# Patient Record
Sex: FEMALE | Race: BLACK OR AFRICAN AMERICAN | NOT HISPANIC OR LATINO
[De-identification: names, ages, dates, MRNs, and addresses within clinical notes are randomized per-mention and may not be internally consistent; named-entity substitution may affect disease eponyms.]

---

## 2017-12-29 ENCOUNTER — APPOINTMENT (OUTPATIENT)
Dept: INTERNAL MEDICINE | Facility: CLINIC | Age: 32
End: 2017-12-29
Payer: COMMERCIAL

## 2017-12-29 VITALS
WEIGHT: 195 LBS | DIASTOLIC BLOOD PRESSURE: 80 MMHG | HEIGHT: 67 IN | BODY MASS INDEX: 30.61 KG/M2 | TEMPERATURE: 96.9 F | SYSTOLIC BLOOD PRESSURE: 130 MMHG

## 2017-12-29 DIAGNOSIS — Z87.09 PERSONAL HISTORY OF OTHER DISEASES OF THE RESPIRATORY SYSTEM: ICD-10-CM

## 2017-12-29 PROCEDURE — 87880 STREP A ASSAY W/OPTIC: CPT | Mod: QW

## 2017-12-29 PROCEDURE — 99213 OFFICE O/P EST LOW 20 MIN: CPT | Mod: 25

## 2018-01-03 LAB
BACTERIA THROAT CULT: NORMAL
S PYO AG SPEC QL IA: NEGATIVE

## 2019-03-24 ENCOUNTER — TRANSCRIPTION ENCOUNTER (OUTPATIENT)
Age: 34
End: 2019-03-24

## 2019-03-27 ENCOUNTER — LABORATORY RESULT (OUTPATIENT)
Age: 34
End: 2019-03-27

## 2019-03-27 ENCOUNTER — APPOINTMENT (OUTPATIENT)
Dept: INTERNAL MEDICINE | Facility: CLINIC | Age: 34
End: 2019-03-27
Payer: COMMERCIAL

## 2019-03-27 VITALS
TEMPERATURE: 97.7 F | BODY MASS INDEX: 30.29 KG/M2 | SYSTOLIC BLOOD PRESSURE: 120 MMHG | DIASTOLIC BLOOD PRESSURE: 70 MMHG | WEIGHT: 193 LBS | HEIGHT: 67 IN

## 2019-03-27 DIAGNOSIS — L23.9 ALLERGIC CONTACT DERMATITIS, UNSPECIFIED CAUSE: ICD-10-CM

## 2019-03-27 DIAGNOSIS — L50.0 ALLERGIC URTICARIA: ICD-10-CM

## 2019-03-27 PROCEDURE — 36415 COLL VENOUS BLD VENIPUNCTURE: CPT

## 2019-03-27 PROCEDURE — 99214 OFFICE O/P EST MOD 30 MIN: CPT | Mod: 25

## 2019-03-27 RX ORDER — AZITHROMYCIN 250 MG/1
250 TABLET, FILM COATED ORAL
Qty: 6 | Refills: 1 | Status: COMPLETED | COMMUNITY
Start: 2017-12-29 | End: 2019-03-27

## 2019-03-28 LAB
ALBUMIN SERPL ELPH-MCNC: 4.7 G/DL
ALP BLD-CCNC: 65 U/L
ALT SERPL-CCNC: 25 U/L
ANION GAP SERPL CALC-SCNC: 15 MMOL/L
AST SERPL-CCNC: 28 U/L
BASOPHILS # BLD AUTO: 0.03 K/UL
BASOPHILS NFR BLD AUTO: 0.4 %
BILIRUB SERPL-MCNC: <0.2 MG/DL
BUN SERPL-MCNC: 13 MG/DL
CALCIUM SERPL-MCNC: 9.7 MG/DL
CHLORIDE SERPL-SCNC: 107 MMOL/L
CHOLEST SERPL-MCNC: 199 MG/DL
CHOLEST/HDLC SERPL: 4 RATIO
CO2 SERPL-SCNC: 18 MMOL/L
CREAT SERPL-MCNC: 0.69 MG/DL
EOSINOPHIL # BLD AUTO: 0.09 K/UL
EOSINOPHIL NFR BLD AUTO: 1.2 %
ERYTHROCYTE [SEDIMENTATION RATE] IN BLOOD BY WESTERGREN METHOD: 10 MM/HR
GLUCOSE SERPL-MCNC: 77 MG/DL
HCT VFR BLD CALC: 44.2 %
HDLC SERPL-MCNC: 50 MG/DL
HGB BLD-MCNC: 13 G/DL
IMM GRANULOCYTES NFR BLD AUTO: 0.3 %
LDLC SERPL CALC-MCNC: 130 MG/DL
LDLC SERPL DIRECT ASSAY-MCNC: 138 MG/DL
LYMPHOCYTES # BLD AUTO: 2.38 K/UL
LYMPHOCYTES NFR BLD AUTO: 31.9 %
MAN DIFF?: NORMAL
MCHC RBC-ENTMCNC: 29.1 PG
MCHC RBC-ENTMCNC: 29.4 GM/DL
MCV RBC AUTO: 98.9 FL
MONOCYTES # BLD AUTO: 0.66 K/UL
MONOCYTES NFR BLD AUTO: 8.8 %
NEUTROPHILS # BLD AUTO: 4.28 K/UL
NEUTROPHILS NFR BLD AUTO: 57.4 %
PLATELET # BLD AUTO: 309 K/UL
POTASSIUM SERPL-SCNC: 4.6 MMOL/L
PROT SERPL-MCNC: 7.9 G/DL
RBC # BLD: 4.47 M/UL
RBC # FLD: 13.5 %
SAVE SPECIMEN: NORMAL
SODIUM SERPL-SCNC: 140 MMOL/L
T3 SERPL-MCNC: 127 NG/DL
T3FREE SERPL-MCNC: 3.47 PG/ML
T3RU NFR SERPL: 1.1 TBI
T4 FREE SERPL-MCNC: 1.2 NG/DL
T4 SERPL-MCNC: 7.7 UG/DL
TRIGL SERPL-MCNC: 95 MG/DL
TSH SERPL-ACNC: 1.66 UIU/ML
WBC # FLD AUTO: 7.46 K/UL

## 2019-03-28 NOTE — PHYSICAL EXAM

## 2019-03-28 NOTE — HISTORY OF PRESENT ILLNESS
[FreeTextEntry1] : pt presents for f/u for new perioral rash/ ? allergic reaction hypertension and recent increased fatigue.  She is visiting from [place], [de-identified] : Pt reports itchy dermatitis around lips and along bilateral corners of her mouth for the past 5 days. ? reaction to something she ate--cannot recall what initiated the reaction.\par \par BP has been stable on Propranolol ER 120mg and HCTZ.\par

## 2019-06-20 ENCOUNTER — LABORATORY RESULT (OUTPATIENT)
Age: 34
End: 2019-06-20

## 2019-06-20 ENCOUNTER — APPOINTMENT (OUTPATIENT)
Dept: INTERNAL MEDICINE | Facility: CLINIC | Age: 34
End: 2019-06-20
Payer: COMMERCIAL

## 2019-06-20 VITALS
SYSTOLIC BLOOD PRESSURE: 124 MMHG | TEMPERATURE: 97.7 F | WEIGHT: 193 LBS | BODY MASS INDEX: 30.29 KG/M2 | DIASTOLIC BLOOD PRESSURE: 78 MMHG | HEIGHT: 67 IN

## 2019-06-20 VITALS — DIASTOLIC BLOOD PRESSURE: 80 MMHG | SYSTOLIC BLOOD PRESSURE: 132 MMHG

## 2019-06-20 DIAGNOSIS — R00.2 PALPITATIONS: ICD-10-CM

## 2019-06-20 PROCEDURE — 99214 OFFICE O/P EST MOD 30 MIN: CPT | Mod: 25

## 2019-06-20 PROCEDURE — 36415 COLL VENOUS BLD VENIPUNCTURE: CPT

## 2019-06-20 PROCEDURE — 93000 ELECTROCARDIOGRAM COMPLETE: CPT

## 2019-06-20 RX ORDER — TERCONAZOLE 8 MG/G
0.8 CREAM VAGINAL
Qty: 20 | Refills: 0 | Status: COMPLETED | COMMUNITY
Start: 2017-12-21 | End: 2019-06-20

## 2019-06-20 RX ORDER — MOMETASONE FUROATE 1 MG/G
0.1 OINTMENT TOPICAL TWICE DAILY
Qty: 1 | Refills: 0 | Status: COMPLETED | COMMUNITY
Start: 2019-03-27 | End: 2019-06-20

## 2019-06-21 LAB
ALBUMIN SERPL ELPH-MCNC: 5 G/DL
ALP BLD-CCNC: 72 U/L
ALT SERPL-CCNC: 29 U/L
ANION GAP SERPL CALC-SCNC: 18 MMOL/L
AST SERPL-CCNC: 22 U/L
BASOPHILS # BLD AUTO: 0.04 K/UL
BASOPHILS NFR BLD AUTO: 0.6 %
BILIRUB SERPL-MCNC: 0.3 MG/DL
BUN SERPL-MCNC: 18 MG/DL
CA-I SERPL-SCNC: 1.38 MMOL/L
CALCIUM SERPL-MCNC: 10.4 MG/DL
CALCIUM SERPL-MCNC: 10.4 MG/DL
CHLORIDE SERPL-SCNC: 103 MMOL/L
CO2 SERPL-SCNC: 18 MMOL/L
CREAT SERPL-MCNC: 0.63 MG/DL
EOSINOPHIL # BLD AUTO: 0.08 K/UL
EOSINOPHIL NFR BLD AUTO: 1.2 %
GLUCOSE SERPL-MCNC: 104 MG/DL
HCT VFR BLD CALC: 42.5 %
HGB BLD-MCNC: 13.3 G/DL
IMM GRANULOCYTES NFR BLD AUTO: 0.5 %
LYMPHOCYTES # BLD AUTO: 1.6 K/UL
LYMPHOCYTES NFR BLD AUTO: 24.4 %
MAN DIFF?: NORMAL
MCHC RBC-ENTMCNC: 29.2 PG
MCHC RBC-ENTMCNC: 31.3 GM/DL
MCV RBC AUTO: 93.4 FL
MONOCYTES # BLD AUTO: 0.74 K/UL
MONOCYTES NFR BLD AUTO: 11.3 %
NEUTROPHILS # BLD AUTO: 4.06 K/UL
NEUTROPHILS NFR BLD AUTO: 62 %
PARATHYROID HORMONE INTACT: 46 PG/ML
PHOSPHATE SERPL-MCNC: 3.5 MG/DL
PLATELET # BLD AUTO: 306 K/UL
POTASSIUM SERPL-SCNC: 4.5 MMOL/L
PROT SERPL-MCNC: 8 G/DL
RBC # BLD: 4.55 M/UL
RBC # FLD: 12.4 %
SAVE SPECIMEN: NORMAL
SODIUM SERPL-SCNC: 139 MMOL/L
T3 SERPL-MCNC: 113 NG/DL
T3FREE SERPL-MCNC: 2.97 PG/ML
T3RU NFR SERPL: 1.1 TBI
T4 FREE SERPL-MCNC: 1.3 NG/DL
T4 SERPL-MCNC: 7.7 UG/DL
TSH SERPL-ACNC: 1.71 UIU/ML
WBC # FLD AUTO: 6.55 K/UL

## 2019-06-21 NOTE — HISTORY OF PRESENT ILLNESS
[FreeTextEntry1] : Pt presents with her mother for f/u for recently diagnosed elevated calcium level on blood work, and f/u for hypertension and new symptoms of palpitations. [de-identified] : Pt lives in [place]- is a  who will be in [place] for the next few weeks on vacation.\par \par Last week, she went to an urgent care center because of mild LH, Palpitations.\par BP was wnl but her chemistries revealed mildly elevated Calcium level of 10.5\par She was told to f/u with her PCP.\par \par She also notes recent fleeting palpitations of her heart--lasts less than a minute-no chest pains or SOB but gets occasional Lightheadedness with the palpitations.

## 2019-06-26 ENCOUNTER — APPOINTMENT (OUTPATIENT)
Dept: CARDIOLOGY | Facility: CLINIC | Age: 34
End: 2019-06-26
Payer: COMMERCIAL

## 2019-06-26 PROCEDURE — 93306 TTE W/DOPPLER COMPLETE: CPT

## 2020-07-08 DIAGNOSIS — Z3A.15 15 WEEKS GESTATION OF PREGNANCY: ICD-10-CM

## 2020-09-24 ENCOUNTER — APPOINTMENT (OUTPATIENT)
Dept: INTERNAL MEDICINE | Facility: CLINIC | Age: 35
End: 2020-09-24
Payer: COMMERCIAL

## 2020-09-24 ENCOUNTER — ASOB RESULT (OUTPATIENT)
Age: 35
End: 2020-09-24

## 2020-09-24 ENCOUNTER — OUTPATIENT (OUTPATIENT)
Dept: OUTPATIENT SERVICES | Facility: HOSPITAL | Age: 35
LOS: 1 days | End: 2020-09-24
Payer: COMMERCIAL

## 2020-09-24 ENCOUNTER — APPOINTMENT (OUTPATIENT)
Dept: ANTEPARTUM | Facility: CLINIC | Age: 35
End: 2020-09-24

## 2020-09-24 VITALS
SYSTOLIC BLOOD PRESSURE: 150 MMHG | TEMPERATURE: 96.9 F | DIASTOLIC BLOOD PRESSURE: 92 MMHG | WEIGHT: 216 LBS | BODY MASS INDEX: 33.9 KG/M2 | HEIGHT: 67 IN

## 2020-09-24 VITALS — TEMPERATURE: 99 F

## 2020-09-24 DIAGNOSIS — Z34.90 ENCOUNTER FOR SUPERVISION OF NORMAL PREGNANCY, UNSPECIFIED, UNSPECIFIED TRIMESTER: ICD-10-CM

## 2020-09-24 DIAGNOSIS — Z3A.00 WEEKS OF GESTATION OF PREGNANCY NOT SPECIFIED: ICD-10-CM

## 2020-09-24 DIAGNOSIS — O26.899 OTHER SPECIFIED PREGNANCY RELATED CONDITIONS, UNSPECIFIED TRIMESTER: ICD-10-CM

## 2020-09-24 DIAGNOSIS — R10.30 LOWER ABDOMINAL PAIN, UNSPECIFIED: ICD-10-CM

## 2020-09-24 LAB
ALBUMIN SERPL ELPH-MCNC: 3.8 G/DL — SIGNIFICANT CHANGE UP (ref 3.3–5)
ALP SERPL-CCNC: 66 U/L — SIGNIFICANT CHANGE UP (ref 40–120)
ALT FLD-CCNC: 14 U/L — SIGNIFICANT CHANGE UP (ref 10–45)
ANION GAP SERPL CALC-SCNC: 13 MMOL/L — SIGNIFICANT CHANGE UP (ref 5–17)
APPEARANCE UR: ABNORMAL
APTT BLD: 27 SEC — LOW (ref 27.5–35.5)
AST SERPL-CCNC: 11 U/L — SIGNIFICANT CHANGE UP (ref 10–40)
BACTERIA # UR AUTO: ABNORMAL
BASOPHILS # BLD AUTO: 0.03 K/UL — SIGNIFICANT CHANGE UP (ref 0–0.2)
BASOPHILS NFR BLD AUTO: 0.2 % — SIGNIFICANT CHANGE UP (ref 0–2)
BILIRUB SERPL-MCNC: 0.2 MG/DL — SIGNIFICANT CHANGE UP (ref 0.2–1.2)
BILIRUB UR-MCNC: NEGATIVE — SIGNIFICANT CHANGE UP
BUN SERPL-MCNC: 5 MG/DL — LOW (ref 7–23)
CALCIUM SERPL-MCNC: 10.2 MG/DL — SIGNIFICANT CHANGE UP (ref 8.4–10.5)
CHLORIDE SERPL-SCNC: 102 MMOL/L — SIGNIFICANT CHANGE UP (ref 96–108)
CO2 SERPL-SCNC: 20 MMOL/L — LOW (ref 22–31)
COLOR SPEC: SIGNIFICANT CHANGE UP
CREAT ?TM UR-MCNC: 44 MG/DL — SIGNIFICANT CHANGE UP
CREAT SERPL-MCNC: 0.37 MG/DL — LOW (ref 0.5–1.3)
DIFF PNL FLD: ABNORMAL
EOSINOPHIL # BLD AUTO: 0.05 K/UL — SIGNIFICANT CHANGE UP (ref 0–0.5)
EOSINOPHIL NFR BLD AUTO: 0.3 % — SIGNIFICANT CHANGE UP (ref 0–6)
EPI CELLS # UR: 0 /HPF — SIGNIFICANT CHANGE UP
FIBRINOGEN PPP-MCNC: 667 MG/DL — HIGH (ref 350–510)
GLUCOSE SERPL-MCNC: 86 MG/DL — SIGNIFICANT CHANGE UP (ref 70–99)
GLUCOSE UR QL: NEGATIVE — SIGNIFICANT CHANGE UP
HCT VFR BLD CALC: 34.5 % — SIGNIFICANT CHANGE UP (ref 34.5–45)
HGB BLD-MCNC: 11.4 G/DL — LOW (ref 11.5–15.5)
HYALINE CASTS # UR AUTO: 2 /LPF — SIGNIFICANT CHANGE UP (ref 0–2)
IMM GRANULOCYTES NFR BLD AUTO: 1.3 % — SIGNIFICANT CHANGE UP (ref 0–1.5)
INR BLD: 0.98 RATIO — SIGNIFICANT CHANGE UP (ref 0.88–1.16)
KETONES UR-MCNC: SIGNIFICANT CHANGE UP
LDH SERPL L TO P-CCNC: 107 U/L — SIGNIFICANT CHANGE UP (ref 50–242)
LEUKOCYTE ESTERASE UR-ACNC: ABNORMAL
LYMPHOCYTES # BLD AUTO: 14.9 % — SIGNIFICANT CHANGE UP (ref 13–44)
LYMPHOCYTES # BLD AUTO: 2.14 K/UL — SIGNIFICANT CHANGE UP (ref 1–3.3)
MCHC RBC-ENTMCNC: 30.5 PG — SIGNIFICANT CHANGE UP (ref 27–34)
MCHC RBC-ENTMCNC: 33 GM/DL — SIGNIFICANT CHANGE UP (ref 32–36)
MCV RBC AUTO: 92.2 FL — SIGNIFICANT CHANGE UP (ref 80–100)
MONOCYTES # BLD AUTO: 1.14 K/UL — HIGH (ref 0–0.9)
MONOCYTES NFR BLD AUTO: 7.9 % — SIGNIFICANT CHANGE UP (ref 2–14)
NEUTROPHILS # BLD AUTO: 10.82 K/UL — HIGH (ref 1.8–7.4)
NEUTROPHILS NFR BLD AUTO: 75.4 % — SIGNIFICANT CHANGE UP (ref 43–77)
NITRITE UR-MCNC: NEGATIVE — SIGNIFICANT CHANGE UP
NRBC # BLD: 0 /100 WBCS — SIGNIFICANT CHANGE UP (ref 0–0)
PH UR: 7 — SIGNIFICANT CHANGE UP (ref 5–8)
PLATELET # BLD AUTO: 300 K/UL — SIGNIFICANT CHANGE UP (ref 150–400)
POTASSIUM SERPL-MCNC: 3.6 MMOL/L — SIGNIFICANT CHANGE UP (ref 3.5–5.3)
POTASSIUM SERPL-SCNC: 3.6 MMOL/L — SIGNIFICANT CHANGE UP (ref 3.5–5.3)
PROT ?TM UR-MCNC: 79 MG/DL — HIGH (ref 0–12)
PROT SERPL-MCNC: 6.8 G/DL — SIGNIFICANT CHANGE UP (ref 6–8.3)
PROT UR-MCNC: ABNORMAL
PROT/CREAT UR-RTO: 1.8 RATIO — HIGH (ref 0–0.2)
PROTHROM AB SERPL-ACNC: 11.7 SEC — SIGNIFICANT CHANGE UP (ref 10.6–13.6)
RBC # BLD: 3.74 M/UL — LOW (ref 3.8–5.2)
RBC # FLD: 12.9 % — SIGNIFICANT CHANGE UP (ref 10.3–14.5)
RBC CASTS # UR COMP ASSIST: 86 /HPF — HIGH (ref 0–4)
SODIUM SERPL-SCNC: 135 MMOL/L — SIGNIFICANT CHANGE UP (ref 135–145)
SP GR SPEC: 1.01 — SIGNIFICANT CHANGE UP (ref 1.01–1.02)
URATE SERPL-MCNC: 5.1 MG/DL — SIGNIFICANT CHANGE UP (ref 2.5–7)
UROBILINOGEN FLD QL: NEGATIVE — SIGNIFICANT CHANGE UP
WBC # BLD: 14.37 K/UL — HIGH (ref 3.8–10.5)
WBC # FLD AUTO: 14.37 K/UL — HIGH (ref 3.8–10.5)
WBC UR QL: 46 /HPF — HIGH (ref 0–5)

## 2020-09-24 PROCEDURE — 84550 ASSAY OF BLOOD/URIC ACID: CPT

## 2020-09-24 PROCEDURE — G0463: CPT

## 2020-09-24 PROCEDURE — 85610 PROTHROMBIN TIME: CPT

## 2020-09-24 PROCEDURE — 87086 URINE CULTURE/COLONY COUNT: CPT

## 2020-09-24 PROCEDURE — 85025 COMPLETE CBC W/AUTO DIFF WBC: CPT

## 2020-09-24 PROCEDURE — 99212 OFFICE O/P EST SF 10 MIN: CPT | Mod: 25

## 2020-09-24 PROCEDURE — 82570 ASSAY OF URINE CREATININE: CPT

## 2020-09-24 PROCEDURE — 85730 THROMBOPLASTIN TIME PARTIAL: CPT

## 2020-09-24 PROCEDURE — 85384 FIBRINOGEN ACTIVITY: CPT

## 2020-09-24 PROCEDURE — 80053 COMPREHEN METABOLIC PANEL: CPT

## 2020-09-24 PROCEDURE — 81002 URINALYSIS NONAUTO W/O SCOPE: CPT

## 2020-09-24 PROCEDURE — 81001 URINALYSIS AUTO W/SCOPE: CPT

## 2020-09-24 PROCEDURE — 83615 LACTATE (LD) (LDH) ENZYME: CPT

## 2020-09-24 PROCEDURE — 99214 OFFICE O/P EST MOD 30 MIN: CPT

## 2020-09-24 PROCEDURE — 59025 FETAL NON-STRESS TEST: CPT | Mod: 26

## 2020-09-24 PROCEDURE — 59025 FETAL NON-STRESS TEST: CPT

## 2020-09-24 PROCEDURE — 84156 ASSAY OF PROTEIN URINE: CPT

## 2020-09-24 RX ORDER — CEPHALEXIN 500 MG
1 CAPSULE ORAL
Qty: 14 | Refills: 0
Start: 2020-09-24 | End: 2020-09-30

## 2020-09-26 LAB
CULTURE RESULTS: SIGNIFICANT CHANGE UP
SPECIMEN SOURCE: SIGNIFICANT CHANGE UP

## 2020-11-04 ENCOUNTER — FORM ENCOUNTER (OUTPATIENT)
Age: 35
End: 2020-11-04

## 2020-11-05 ENCOUNTER — APPOINTMENT (OUTPATIENT)
Dept: OBGYN | Facility: CLINIC | Age: 35
End: 2020-11-05
Payer: COMMERCIAL

## 2020-11-05 PROCEDURE — 99214 OFFICE O/P EST MOD 30 MIN: CPT | Mod: 25

## 2020-11-05 PROCEDURE — 99072 ADDL SUPL MATRL&STAF TM PHE: CPT

## 2020-11-05 PROCEDURE — 0500F INITIAL PRENATAL CARE VISIT: CPT

## 2020-11-08 ENCOUNTER — FORM ENCOUNTER (OUTPATIENT)
Age: 35
End: 2020-11-08

## 2020-11-11 ENCOUNTER — APPOINTMENT (OUTPATIENT)
Dept: OBGYN | Facility: CLINIC | Age: 35
End: 2020-11-11
Payer: COMMERCIAL

## 2020-11-11 ENCOUNTER — FORM ENCOUNTER (OUTPATIENT)
Age: 35
End: 2020-11-11

## 2020-11-11 PROCEDURE — 90471 IMMUNIZATION ADMIN: CPT

## 2020-11-11 PROCEDURE — 90715 TDAP VACCINE 7 YRS/> IM: CPT

## 2020-11-11 PROCEDURE — 99072 ADDL SUPL MATRL&STAF TM PHE: CPT

## 2020-11-11 PROCEDURE — 36415 COLL VENOUS BLD VENIPUNCTURE: CPT

## 2020-11-18 ENCOUNTER — APPOINTMENT (OUTPATIENT)
Dept: OBGYN | Facility: CLINIC | Age: 35
End: 2020-11-18
Payer: COMMERCIAL

## 2020-11-18 ENCOUNTER — OUTPATIENT (OUTPATIENT)
Dept: OUTPATIENT SERVICES | Facility: HOSPITAL | Age: 35
LOS: 1 days | End: 2020-11-18
Payer: COMMERCIAL

## 2020-11-18 VITALS
DIASTOLIC BLOOD PRESSURE: 79 MMHG | TEMPERATURE: 100 F | RESPIRATION RATE: 16 BRPM | HEART RATE: 99 BPM | SYSTOLIC BLOOD PRESSURE: 127 MMHG

## 2020-11-18 DIAGNOSIS — Z3A.00 WEEKS OF GESTATION OF PREGNANCY NOT SPECIFIED: ICD-10-CM

## 2020-11-18 DIAGNOSIS — O26.899 OTHER SPECIFIED PREGNANCY RELATED CONDITIONS, UNSPECIFIED TRIMESTER: ICD-10-CM

## 2020-11-18 LAB
ALBUMIN SERPL ELPH-MCNC: 3.6 G/DL — SIGNIFICANT CHANGE UP (ref 3.3–5)
ALP SERPL-CCNC: 112 U/L — SIGNIFICANT CHANGE UP (ref 40–120)
ALT FLD-CCNC: 13 U/L — SIGNIFICANT CHANGE UP (ref 10–45)
ANION GAP SERPL CALC-SCNC: 14 MMOL/L — SIGNIFICANT CHANGE UP (ref 5–17)
APPEARANCE UR: CLEAR — SIGNIFICANT CHANGE UP
APTT BLD: 25.1 SEC — LOW (ref 27.5–35.5)
AST SERPL-CCNC: 13 U/L — SIGNIFICANT CHANGE UP (ref 10–40)
BASOPHILS # BLD AUTO: 0.03 K/UL — SIGNIFICANT CHANGE UP (ref 0–0.2)
BASOPHILS NFR BLD AUTO: 0.4 % — SIGNIFICANT CHANGE UP (ref 0–2)
BILIRUB SERPL-MCNC: 0.2 MG/DL — SIGNIFICANT CHANGE UP (ref 0.2–1.2)
BILIRUB UR-MCNC: NEGATIVE — SIGNIFICANT CHANGE UP
BUN SERPL-MCNC: 6 MG/DL — LOW (ref 7–23)
CALCIUM SERPL-MCNC: 10.5 MG/DL — SIGNIFICANT CHANGE UP (ref 8.4–10.5)
CHLORIDE SERPL-SCNC: 105 MMOL/L — SIGNIFICANT CHANGE UP (ref 96–108)
CO2 SERPL-SCNC: 18 MMOL/L — LOW (ref 22–31)
COLOR SPEC: SIGNIFICANT CHANGE UP
CREAT ?TM UR-MCNC: 77 MG/DL — SIGNIFICANT CHANGE UP
CREAT SERPL-MCNC: 0.41 MG/DL — LOW (ref 0.5–1.3)
DIFF PNL FLD: NEGATIVE — SIGNIFICANT CHANGE UP
EOSINOPHIL # BLD AUTO: 0.04 K/UL — SIGNIFICANT CHANGE UP (ref 0–0.5)
EOSINOPHIL NFR BLD AUTO: 0.5 % — SIGNIFICANT CHANGE UP (ref 0–6)
FIBRINOGEN PPP-MCNC: 796 MG/DL — HIGH (ref 290–520)
GLUCOSE SERPL-MCNC: 85 MG/DL — SIGNIFICANT CHANGE UP (ref 70–99)
GLUCOSE UR QL: NEGATIVE — SIGNIFICANT CHANGE UP
HCT VFR BLD CALC: 36.3 % — SIGNIFICANT CHANGE UP (ref 34.5–45)
HGB BLD-MCNC: 11.8 G/DL — SIGNIFICANT CHANGE UP (ref 11.5–15.5)
IMM GRANULOCYTES NFR BLD AUTO: 0.7 % — SIGNIFICANT CHANGE UP (ref 0–1.5)
INR BLD: 0.91 RATIO — SIGNIFICANT CHANGE UP (ref 0.88–1.16)
KETONES UR-MCNC: NEGATIVE — SIGNIFICANT CHANGE UP
LDH SERPL L TO P-CCNC: 122 U/L — SIGNIFICANT CHANGE UP (ref 50–242)
LEUKOCYTE ESTERASE UR-ACNC: NEGATIVE — SIGNIFICANT CHANGE UP
LYMPHOCYTES # BLD AUTO: 1.76 K/UL — SIGNIFICANT CHANGE UP (ref 1–3.3)
LYMPHOCYTES # BLD AUTO: 20.9 % — SIGNIFICANT CHANGE UP (ref 13–44)
MCHC RBC-ENTMCNC: 29.7 PG — SIGNIFICANT CHANGE UP (ref 27–34)
MCHC RBC-ENTMCNC: 32.5 GM/DL — SIGNIFICANT CHANGE UP (ref 32–36)
MCV RBC AUTO: 91.4 FL — SIGNIFICANT CHANGE UP (ref 80–100)
MONOCYTES # BLD AUTO: 0.89 K/UL — SIGNIFICANT CHANGE UP (ref 0–0.9)
MONOCYTES NFR BLD AUTO: 10.5 % — SIGNIFICANT CHANGE UP (ref 2–14)
NEUTROPHILS # BLD AUTO: 5.66 K/UL — SIGNIFICANT CHANGE UP (ref 1.8–7.4)
NEUTROPHILS NFR BLD AUTO: 67 % — SIGNIFICANT CHANGE UP (ref 43–77)
NITRITE UR-MCNC: NEGATIVE — SIGNIFICANT CHANGE UP
NRBC # BLD: 0 /100 WBCS — SIGNIFICANT CHANGE UP (ref 0–0)
PH UR: 6.5 — SIGNIFICANT CHANGE UP (ref 5–8)
PLATELET # BLD AUTO: 262 K/UL — SIGNIFICANT CHANGE UP (ref 150–400)
POTASSIUM SERPL-MCNC: 4 MMOL/L — SIGNIFICANT CHANGE UP (ref 3.5–5.3)
POTASSIUM SERPL-SCNC: 4 MMOL/L — SIGNIFICANT CHANGE UP (ref 3.5–5.3)
PROT ?TM UR-MCNC: 10 MG/DL — SIGNIFICANT CHANGE UP (ref 0–12)
PROT SERPL-MCNC: 6.6 G/DL — SIGNIFICANT CHANGE UP (ref 6–8.3)
PROT UR-MCNC: NEGATIVE — SIGNIFICANT CHANGE UP
PROT/CREAT UR-RTO: 0.1 RATIO — SIGNIFICANT CHANGE UP (ref 0–0.2)
PROTHROM AB SERPL-ACNC: 11 SEC — SIGNIFICANT CHANGE UP (ref 10.6–13.6)
RBC # BLD: 3.97 M/UL — SIGNIFICANT CHANGE UP (ref 3.8–5.2)
RBC # FLD: 13.2 % — SIGNIFICANT CHANGE UP (ref 10.3–14.5)
SODIUM SERPL-SCNC: 137 MMOL/L — SIGNIFICANT CHANGE UP (ref 135–145)
SP GR SPEC: 1.02 — SIGNIFICANT CHANGE UP (ref 1.01–1.02)
URATE SERPL-MCNC: 6.1 MG/DL — SIGNIFICANT CHANGE UP (ref 2.5–7)
UROBILINOGEN FLD QL: NEGATIVE — SIGNIFICANT CHANGE UP
WBC # BLD: 8.44 K/UL — SIGNIFICANT CHANGE UP (ref 3.8–10.5)
WBC # FLD AUTO: 8.44 K/UL — SIGNIFICANT CHANGE UP (ref 3.8–10.5)

## 2020-11-18 PROCEDURE — 84156 ASSAY OF PROTEIN URINE: CPT

## 2020-11-18 PROCEDURE — 85610 PROTHROMBIN TIME: CPT

## 2020-11-18 PROCEDURE — 85025 COMPLETE CBC W/AUTO DIFF WBC: CPT

## 2020-11-18 PROCEDURE — 76816 OB US FOLLOW-UP PER FETUS: CPT

## 2020-11-18 PROCEDURE — 0502F SUBSEQUENT PRENATAL CARE: CPT

## 2020-11-18 PROCEDURE — 99214 OFFICE O/P EST MOD 30 MIN: CPT

## 2020-11-18 PROCEDURE — 84550 ASSAY OF BLOOD/URIC ACID: CPT

## 2020-11-18 PROCEDURE — 81003 URINALYSIS AUTO W/O SCOPE: CPT

## 2020-11-18 PROCEDURE — 80053 COMPREHEN METABOLIC PANEL: CPT

## 2020-11-18 PROCEDURE — G0463: CPT

## 2020-11-18 PROCEDURE — 99213 OFFICE O/P EST LOW 20 MIN: CPT | Mod: 25

## 2020-11-18 PROCEDURE — 82570 ASSAY OF URINE CREATININE: CPT

## 2020-11-18 PROCEDURE — 99072 ADDL SUPL MATRL&STAF TM PHE: CPT

## 2020-11-18 PROCEDURE — 83615 LACTATE (LD) (LDH) ENZYME: CPT

## 2020-11-18 PROCEDURE — 59025 FETAL NON-STRESS TEST: CPT

## 2020-11-18 PROCEDURE — 85384 FIBRINOGEN ACTIVITY: CPT

## 2020-11-18 PROCEDURE — 85730 THROMBOPLASTIN TIME PARTIAL: CPT

## 2020-11-18 NOTE — OB PROVIDER TRIAGE NOTE - NSHPLABSRESULTS_GEN_ALL_CORE
11.8   8.44  )-----------( 262      ( 2020 16:17 )             36.3       137  |  105  |  6<L>  ----------------------------<  85  4.0   |  18<L>  |  0.41<L>    Ca    10.5      2020 16:17    TPro  6.6  /  Alb  3.6  /  TBili  0.2  /  DBili  x   /  AST  13  /  ALT  13  /  AlkPhos  112      Urinalysis Basic - ( 2020 16:17 )    Color: Light Yellow / Appearance: Clear / S.018 / pH: x  Gluc: x / Ketone: Negative  / Bili: Negative / Urobili: Negative   Blood: x / Protein: Negative / Nitrite: Negative   Leuk Esterase: Negative / RBC: x / WBC x   Sq Epi: x / Non Sq Epi: x / Bacteria: x    P/C ratio 0.1

## 2020-11-18 NOTE — OB PROVIDER TRIAGE NOTE - ADDITIONAL INSTRUCTIONS
- Plan is to D/C home  - Daily FKC  - 24 hour urine collection  - F/u in office on monday and bring 24 hour urine collection  - Pt to continue checking BP's daily at home  - PTL precautions  - PEC precautions  - Pt will discuss with office regarding her decision about external cephalic version

## 2020-11-18 NOTE — OB PROVIDER TRIAGE NOTE - HISTORY OF PRESENT ILLNESS
36yo P0 @ 33w 6 d presents from office for evaluation secondary to new onset elevated BP today.  Pt with h/o CHTN and has been off of medications the entire pregnency.  Pt denies HA, visual changes or epigastric pain   Denies contractions, VB or LOF has + FM    PNC: Dr Doherty  PNI: uncomplicated  PNL: unavailable, will obtain if admitted    All: NKDA  Meds: PNV, ASA ( pt was on propranolol and HCTZ prior to preg)  PMHx: CHTN dx 2016  PSHx: Denies  Socialhx: Deniesx 3  OBhx: Primigravid  GYNhx: Denies fibroids, ov cysts or STDs    ICU Vital Signs Last 24 Hrs  T(C): 37.1 (18 Nov 2020 14:37), Max: 37.1 (18 Nov 2020 14:37)  T(F): 98.8 (18 Nov 2020 14:37), Max: 98.8 (18 Nov 2020 14:37)  HR: 92 (18 Nov 2020 15:36) (89 - 109)  BP: 132/87 (18 Nov 2020 15:35) (132/86 - 140/92)  RR: 16 (18 Nov 2020 14:37) (16 - 16)  SpO2: 99% (18 Nov 2020 15:36) (97% - 99%)    Gen: NAD  Heart: RRR  Lungs: CTA B/L  Abdomen: Gravid, NT  Ext: no calf tenderness      NST: 140's moderate variablity + accels no decels  TOCO: none  VE: deferred       34yo P0 @ 33w 6 d presents from office for evaluation secondary to new onset elevated BP today.  Pt with h/o CHTN and has been off of medications the entire pregnency.  Pt denies HA, visual changes or epigastric pain   Denies contractions, VB or LOF has + FM    PNC: Dr Doherty  PNI: uncomplicated  PNL: unavailable, will obtain if admitted    All: NKDA  Meds: PNV, ASA ( pt was on propranolol and HCTZ prior to preg)  PMHx: CHTN dx 2016  PSHx: Denies  Socialhx: Deniesx 3  OBhx: Primigravid  GYNhx: Denies fibroids, ov cysts or STDs    ICU Vital Signs Last 24 Hrs  T(C): 37.1 (18 Nov 2020 14:37), Max: 37.1 (18 Nov 2020 14:37)  T(F): 98.8 (18 Nov 2020 14:37), Max: 98.8 (18 Nov 2020 14:37)  HR: 92 (18 Nov 2020 15:36) (89 - 109)  BP: 132/87 (18 Nov 2020 15:35) (132/86 - 140/92)  RR: 16 (18 Nov 2020 14:37) (16 - 16)  SpO2: 99% (18 Nov 2020 15:36) (97% - 99%)    Gen: NAD  Heart: RRR  Lungs: CTA B/L  Abdomen: Gravid, NT  Ext: no calf tenderness      NST: 130's moderate variablity + accels no decels  TOCO: none  VE: deferred

## 2020-11-18 NOTE — OB PROVIDER TRIAGE NOTE - PLAN OF CARE
Return to baseline BP's - Plan is to D/C home  - Daily FKC  - 24 hour urine collection  - F/u in office on monday and bring 24 hour urine collection  - Pt to continue checking BP's daily at home  - PTL precautions  - PEC precautions  - Pt will discuss with office regarding her decision about external cephalic version

## 2020-11-18 NOTE — OB PROVIDER TRIAGE NOTE - NSOBPROVIDERNOTE_OBGYN_ALL_OB_FT
P0 @ 33w 6 d with h/o CHTN, now with new onset elevated BP above baseline--> R/O PEC  - NST/TOCO  - Serial BP's  - HELLP labs/urine P/C  D/W Dr Andres Klein PAKaylynC P0 @ 33w 6 d with h/o CHTN, now with new onset elevated BP above baseline--> R/O PEC  - NST/TOCO  - Serial BP's  - HELLP labs/urine P/C  D/W Dr Andres Klein PA-C    Addendum: 6pm  Pt feeling well  Labs reviewed and all normal  Pt with normal BPs for the last 1.5 hours  NST reactive, TOCO negative    Plan discussed with Dr Campos  Pt with chtn with exacerbation in the third trimester- no evidence of PEC at this time  - Plan is to D/C home  - Daily FKC  - 24 hour urine collection  - F/u in office on monday and bring 24 hour urine collection  - Pt to continue checking BP's daily at home  - PTL precautions  - PEC precautions  - Pt will discuss with office regarding her decision about external cephalic version    Nyasia Klein PA-C

## 2020-11-21 ENCOUNTER — FORM ENCOUNTER (OUTPATIENT)
Age: 35
End: 2020-11-21

## 2020-11-23 ENCOUNTER — APPOINTMENT (OUTPATIENT)
Dept: OBGYN | Facility: CLINIC | Age: 35
End: 2020-11-23
Payer: COMMERCIAL

## 2020-11-23 PROCEDURE — 99211 OFF/OP EST MAY X REQ PHY/QHP: CPT

## 2020-11-25 PROBLEM — O10.919 UNSPECIFIED PRE-EXISTING HYPERTENSION COMPLICATING PREGNANCY, UNSPECIFIED TRIMESTER: Chronic | Status: ACTIVE | Noted: 2020-11-18

## 2020-12-02 ENCOUNTER — FORM ENCOUNTER (OUTPATIENT)
Age: 35
End: 2020-12-02

## 2020-12-03 ENCOUNTER — APPOINTMENT (OUTPATIENT)
Dept: OBGYN | Facility: CLINIC | Age: 35
End: 2020-12-03
Payer: COMMERCIAL

## 2020-12-03 PROCEDURE — 99213 OFFICE O/P EST LOW 20 MIN: CPT | Mod: 25

## 2020-12-03 PROCEDURE — 99072 ADDL SUPL MATRL&STAF TM PHE: CPT

## 2020-12-05 ENCOUNTER — OUTPATIENT (OUTPATIENT)
Dept: OUTPATIENT SERVICES | Facility: HOSPITAL | Age: 35
LOS: 1 days | End: 2020-12-05
Payer: COMMERCIAL

## 2020-12-05 VITALS — HEART RATE: 88 BPM | SYSTOLIC BLOOD PRESSURE: 134 MMHG | DIASTOLIC BLOOD PRESSURE: 87 MMHG

## 2020-12-05 VITALS
DIASTOLIC BLOOD PRESSURE: 93 MMHG | SYSTOLIC BLOOD PRESSURE: 142 MMHG | TEMPERATURE: 99 F | RESPIRATION RATE: 20 BRPM | HEART RATE: 112 BPM

## 2020-12-05 DIAGNOSIS — Z3A.00 WEEKS OF GESTATION OF PREGNANCY NOT SPECIFIED: ICD-10-CM

## 2020-12-05 DIAGNOSIS — O26.899 OTHER SPECIFIED PREGNANCY RELATED CONDITIONS, UNSPECIFIED TRIMESTER: ICD-10-CM

## 2020-12-05 LAB
ALBUMIN SERPL ELPH-MCNC: 3.5 G/DL — SIGNIFICANT CHANGE UP (ref 3.3–5)
ALP SERPL-CCNC: 132 U/L — HIGH (ref 40–120)
ALT FLD-CCNC: 10 U/L — SIGNIFICANT CHANGE UP (ref 10–45)
ANION GAP SERPL CALC-SCNC: 10 MMOL/L — SIGNIFICANT CHANGE UP (ref 5–17)
APPEARANCE UR: CLEAR — SIGNIFICANT CHANGE UP
APTT BLD: 24.4 SEC — LOW (ref 27.5–35.5)
AST SERPL-CCNC: 14 U/L — SIGNIFICANT CHANGE UP (ref 10–40)
BACTERIA # UR AUTO: ABNORMAL
BASOPHILS # BLD AUTO: 0.02 K/UL — SIGNIFICANT CHANGE UP (ref 0–0.2)
BASOPHILS NFR BLD AUTO: 0.2 % — SIGNIFICANT CHANGE UP (ref 0–2)
BILIRUB SERPL-MCNC: 0.2 MG/DL — SIGNIFICANT CHANGE UP (ref 0.2–1.2)
BILIRUB UR-MCNC: NEGATIVE — SIGNIFICANT CHANGE UP
BUN SERPL-MCNC: 8 MG/DL — SIGNIFICANT CHANGE UP (ref 7–23)
CALCIUM SERPL-MCNC: 10.6 MG/DL — HIGH (ref 8.4–10.5)
CHLORIDE SERPL-SCNC: 106 MMOL/L — SIGNIFICANT CHANGE UP (ref 96–108)
CO2 SERPL-SCNC: 20 MMOL/L — LOW (ref 22–31)
COLOR SPEC: YELLOW — SIGNIFICANT CHANGE UP
CREAT ?TM UR-MCNC: 116 MG/DL — SIGNIFICANT CHANGE UP
CREAT SERPL-MCNC: 0.48 MG/DL — LOW (ref 0.5–1.3)
DIFF PNL FLD: NEGATIVE — SIGNIFICANT CHANGE UP
EOSINOPHIL # BLD AUTO: 0.05 K/UL — SIGNIFICANT CHANGE UP (ref 0–0.5)
EOSINOPHIL NFR BLD AUTO: 0.5 % — SIGNIFICANT CHANGE UP (ref 0–6)
EPI CELLS # UR: 4 /HPF — SIGNIFICANT CHANGE UP
FIBRINOGEN PPP-MCNC: 677 MG/DL — HIGH (ref 290–520)
GLUCOSE SERPL-MCNC: 95 MG/DL — SIGNIFICANT CHANGE UP (ref 70–99)
GLUCOSE UR QL: NEGATIVE — SIGNIFICANT CHANGE UP
HCT VFR BLD CALC: 35.2 % — SIGNIFICANT CHANGE UP (ref 34.5–45)
HGB BLD-MCNC: 11.5 G/DL — SIGNIFICANT CHANGE UP (ref 11.5–15.5)
HYALINE CASTS # UR AUTO: 3 /LPF — HIGH (ref 0–2)
IMM GRANULOCYTES NFR BLD AUTO: 0.8 % — SIGNIFICANT CHANGE UP (ref 0–1.5)
INR BLD: 0.86 RATIO — LOW (ref 0.88–1.16)
KETONES UR-MCNC: NEGATIVE — SIGNIFICANT CHANGE UP
LDH SERPL L TO P-CCNC: 133 U/L — SIGNIFICANT CHANGE UP (ref 50–242)
LEUKOCYTE ESTERASE UR-ACNC: NEGATIVE — SIGNIFICANT CHANGE UP
LYMPHOCYTES # BLD AUTO: 1.83 K/UL — SIGNIFICANT CHANGE UP (ref 1–3.3)
LYMPHOCYTES # BLD AUTO: 19.4 % — SIGNIFICANT CHANGE UP (ref 13–44)
MCHC RBC-ENTMCNC: 30.2 PG — SIGNIFICANT CHANGE UP (ref 27–34)
MCHC RBC-ENTMCNC: 32.7 GM/DL — SIGNIFICANT CHANGE UP (ref 32–36)
MCV RBC AUTO: 92.4 FL — SIGNIFICANT CHANGE UP (ref 80–100)
MONOCYTES # BLD AUTO: 0.89 K/UL — SIGNIFICANT CHANGE UP (ref 0–0.9)
MONOCYTES NFR BLD AUTO: 9.4 % — SIGNIFICANT CHANGE UP (ref 2–14)
NEUTROPHILS # BLD AUTO: 6.58 K/UL — SIGNIFICANT CHANGE UP (ref 1.8–7.4)
NEUTROPHILS NFR BLD AUTO: 69.7 % — SIGNIFICANT CHANGE UP (ref 43–77)
NITRITE UR-MCNC: NEGATIVE — SIGNIFICANT CHANGE UP
NRBC # BLD: 0 /100 WBCS — SIGNIFICANT CHANGE UP (ref 0–0)
PH UR: 6 — SIGNIFICANT CHANGE UP (ref 5–8)
PLATELET # BLD AUTO: 248 K/UL — SIGNIFICANT CHANGE UP (ref 150–400)
POTASSIUM SERPL-MCNC: 4.3 MMOL/L — SIGNIFICANT CHANGE UP (ref 3.5–5.3)
POTASSIUM SERPL-SCNC: 4.3 MMOL/L — SIGNIFICANT CHANGE UP (ref 3.5–5.3)
PROT ?TM UR-MCNC: 23 MG/DL — HIGH (ref 0–12)
PROT SERPL-MCNC: 6.3 G/DL — SIGNIFICANT CHANGE UP (ref 6–8.3)
PROT UR-MCNC: ABNORMAL
PROT/CREAT UR-RTO: 0.2 RATIO — SIGNIFICANT CHANGE UP (ref 0–0.2)
PROTHROM AB SERPL-ACNC: 10.4 SEC — LOW (ref 10.6–13.6)
RBC # BLD: 3.81 M/UL — SIGNIFICANT CHANGE UP (ref 3.8–5.2)
RBC # FLD: 13.6 % — SIGNIFICANT CHANGE UP (ref 10.3–14.5)
RBC CASTS # UR COMP ASSIST: 5 /HPF — HIGH (ref 0–4)
SODIUM SERPL-SCNC: 136 MMOL/L — SIGNIFICANT CHANGE UP (ref 135–145)
SP GR SPEC: 1.02 — SIGNIFICANT CHANGE UP (ref 1.01–1.02)
URATE SERPL-MCNC: 6.6 MG/DL — SIGNIFICANT CHANGE UP (ref 2.5–7)
UROBILINOGEN FLD QL: NEGATIVE — SIGNIFICANT CHANGE UP
WBC # BLD: 9.45 K/UL — SIGNIFICANT CHANGE UP (ref 3.8–10.5)
WBC # FLD AUTO: 9.45 K/UL — SIGNIFICANT CHANGE UP (ref 3.8–10.5)
WBC UR QL: 13 /HPF — HIGH (ref 0–5)

## 2020-12-05 PROCEDURE — 81001 URINALYSIS AUTO W/SCOPE: CPT

## 2020-12-05 PROCEDURE — 80053 COMPREHEN METABOLIC PANEL: CPT

## 2020-12-05 PROCEDURE — G0463: CPT

## 2020-12-05 PROCEDURE — 85384 FIBRINOGEN ACTIVITY: CPT

## 2020-12-05 PROCEDURE — 82570 ASSAY OF URINE CREATININE: CPT

## 2020-12-05 PROCEDURE — 85610 PROTHROMBIN TIME: CPT

## 2020-12-05 PROCEDURE — 84550 ASSAY OF BLOOD/URIC ACID: CPT

## 2020-12-05 PROCEDURE — 83615 LACTATE (LD) (LDH) ENZYME: CPT

## 2020-12-05 PROCEDURE — 85730 THROMBOPLASTIN TIME PARTIAL: CPT

## 2020-12-05 PROCEDURE — 84156 ASSAY OF PROTEIN URINE: CPT

## 2020-12-05 PROCEDURE — 85025 COMPLETE CBC W/AUTO DIFF WBC: CPT

## 2020-12-05 PROCEDURE — 59025 FETAL NON-STRESS TEST: CPT

## 2020-12-05 NOTE — OB PROVIDER TRIAGE NOTE - NSHPLABSRESULTS_GEN_ALL_CORE
[facility] pending 11.5   9.45  )-----------( 248      ( 12-05 @ 12:31 )             35.2     12-05 @ 12:31    136  |  106  |  8   ----------------------------<  95  4.3   |  20  |  0.48    Ca    10.6      12-05 @ 12:31    TPro  6.3  /  Alb  3.5  /  TBili  0.2  /  DBili  x   /  AST  14  /  ALT  10  /  AlkPhos  132  12-05 @ 12:31    PT/INR - ( 12-05 @ 12:31 )   PT: 10.4 sec;   INR: 0.86 ratio    PTT - ( 12-05 @ 12:31 )  PTT:24.4 sec    Uric Acid: (12-05 @ 12:31)  6.6      Fibrinogen: (12-05 @ 12:31)  677      LDH: (12-05 @ 12:31)  133    Protein/Creatinine Ratio Calculation: 0.2 Ratio (12.05.20 @ 12:34)

## 2020-12-05 NOTE — OB PROVIDER TRIAGE NOTE - NSOBPROVIDERNOTE_OBGYN_ALL_OB_FT
36yo  @36w2d w/ cHTN p/w headache that resolved and severe range BP  - HELLP labs sent  - P/C sent  - follow BPs  - FHT/TOCO    dw Dr.Crihfield Rigoberto Beth PGY2 36yo  @36w2d w/ cHTN p/w headache that resolved and one severe range BP at home, BPs here normal to low mild range, HELLP labs and P/C ratio wnl, NST reactive. No signs of siPEC, possible exacerbation of cHTN vs. poorly calibrated home BP cuff  - clear for d/c with outpatient follow up  - Pt advised to bring cuff to appointment to ensure it compares correctly to office cuff    dw Dr. Pauline Beth PGY2

## 2020-12-05 NOTE — OB PROVIDER TRIAGE NOTE - HISTORY OF PRESENT ILLNESS
34yo  @36w2d  p/f headache overngiht that has since resolved as well as severe range BP at home. –VB, -LOF, -Ctx, +FM. denies fever, chills, nausea, vomiting, diarrhea, headache, constipation, dizziness, syncope, chest pain, palpitations, shortness of breath, dysuria, urgency, frequency.  PNC: cHTN no medication  GBS: unk  EFW: 6#  ObHx: denies  GynHx: denies  MedHx: cHTN, previously was on HCTZ and Propranolol but was d/c during pregnancy  SrgHx: denies  PsychHx: denies  SocialHx: denies  AllergyHx: denies  RxHx: denies

## 2020-12-05 NOTE — OB PROVIDER TRIAGE NOTE - NSHPPHYSICALEXAM_GEN_ALL_CORE
CV:RRR  Pulm: CTA bl  VE: deferred  FHT:  150   , mod cristina, + accels, - decels SARBJIT  TOCO:  none Vital Signs Last 24 Hours  T(C): 37.3 (12-05-20 @ 12:03), Max: 37.3 (12-05-20 @ 12:03)  HR: 88 (12-05-20 @ 13:40) (88 - 112)  BP: 134/87 (12-05-20 @ 13:40) (134/87 - 142/93)  RR: 20 (12-05-20 @ 12:03) (20 - 20)  SpO2: 98% (12-05-20 @ 13:35) (98% - 99%)    CV:RRR  Pulm: CTA bl  VE: deferred  FHT:  150   , mod cristina, + accels, - decels SARBJIT  TOCO:  none

## 2020-12-07 ENCOUNTER — FORM ENCOUNTER (OUTPATIENT)
Age: 35
End: 2020-12-07

## 2020-12-08 ENCOUNTER — INPATIENT (INPATIENT)
Facility: HOSPITAL | Age: 35
LOS: 6 days | Discharge: HOME | End: 2020-12-15
Attending: OBSTETRICS & GYNECOLOGY | Admitting: OBSTETRICS & GYNECOLOGY
Payer: COMMERCIAL

## 2020-12-08 ENCOUNTER — APPOINTMENT (OUTPATIENT)
Dept: OBGYN | Facility: CLINIC | Age: 35
End: 2020-12-08
Payer: COMMERCIAL

## 2020-12-08 ENCOUNTER — OUTPATIENT (OUTPATIENT)
Dept: OUTPATIENT SERVICES | Facility: HOSPITAL | Age: 35
LOS: 1 days | End: 2020-12-08
Payer: COMMERCIAL

## 2020-12-08 VITALS
SYSTOLIC BLOOD PRESSURE: 162 MMHG | HEIGHT: 66.5 IN | OXYGEN SATURATION: 99 % | TEMPERATURE: 98 F | HEART RATE: 93 BPM | RESPIRATION RATE: 18 BRPM | DIASTOLIC BLOOD PRESSURE: 113 MMHG | WEIGHT: 235.01 LBS

## 2020-12-08 VITALS — OXYGEN SATURATION: 100 %

## 2020-12-08 DIAGNOSIS — Z34.80 ENCOUNTER FOR SUPERVISION OF OTHER NORMAL PREGNANCY, UNSPECIFIED TRIMESTER: ICD-10-CM

## 2020-12-08 DIAGNOSIS — O26.899 OTHER SPECIFIED PREGNANCY RELATED CONDITIONS, UNSPECIFIED TRIMESTER: ICD-10-CM

## 2020-12-08 DIAGNOSIS — O11.9 PRE-EXISTING HYPERTENSION WITH PRE-ECLAMPSIA, UNSPECIFIED TRIMESTER: ICD-10-CM

## 2020-12-08 DIAGNOSIS — Z01.818 ENCOUNTER FOR OTHER PREPROCEDURAL EXAMINATION: ICD-10-CM

## 2020-12-08 DIAGNOSIS — O10.919 UNSPECIFIED PRE-EXISTING HYPERTENSION COMPLICATING PREGNANCY, UNSPECIFIED TRIMESTER: ICD-10-CM

## 2020-12-08 DIAGNOSIS — K08.491 PARTIAL LOSS OF TEETH DUE TO OTHER SPECIFIED CAUSE, CLASS I: Chronic | ICD-10-CM

## 2020-12-08 DIAGNOSIS — Z3A.00 WEEKS OF GESTATION OF PREGNANCY NOT SPECIFIED: ICD-10-CM

## 2020-12-08 DIAGNOSIS — Z3A.37 37 WEEKS GESTATION OF PREGNANCY: ICD-10-CM

## 2020-12-08 LAB
ALBUMIN SERPL ELPH-MCNC: 3.6 G/DL — SIGNIFICANT CHANGE UP (ref 3.3–5)
ALBUMIN SERPL ELPH-MCNC: 3.7 G/DL — SIGNIFICANT CHANGE UP (ref 3.3–5)
ALP SERPL-CCNC: 139 U/L — HIGH (ref 40–120)
ALP SERPL-CCNC: 145 U/L — HIGH (ref 40–120)
ALT FLD-CCNC: 11 U/L — SIGNIFICANT CHANGE UP (ref 10–45)
ALT FLD-CCNC: 9 U/L — LOW (ref 10–45)
ANION GAP SERPL CALC-SCNC: 14 MMOL/L — SIGNIFICANT CHANGE UP (ref 5–17)
ANION GAP SERPL CALC-SCNC: 17 MMOL/L — SIGNIFICANT CHANGE UP (ref 5–17)
APPEARANCE UR: CLEAR — SIGNIFICANT CHANGE UP
APTT BLD: 24.6 SEC — LOW (ref 27.5–35.5)
AST SERPL-CCNC: 13 U/L — SIGNIFICANT CHANGE UP (ref 10–40)
AST SERPL-CCNC: 14 U/L — SIGNIFICANT CHANGE UP (ref 10–40)
BACTERIA # UR AUTO: ABNORMAL
BASOPHILS # BLD AUTO: 0.03 K/UL — SIGNIFICANT CHANGE UP (ref 0–0.2)
BASOPHILS # BLD AUTO: 0.03 K/UL — SIGNIFICANT CHANGE UP (ref 0–0.2)
BASOPHILS NFR BLD AUTO: 0.3 % — SIGNIFICANT CHANGE UP (ref 0–2)
BASOPHILS NFR BLD AUTO: 0.3 % — SIGNIFICANT CHANGE UP (ref 0–2)
BILIRUB SERPL-MCNC: 0.1 MG/DL — LOW (ref 0.2–1.2)
BILIRUB SERPL-MCNC: <0.1 MG/DL — LOW (ref 0.2–1.2)
BILIRUB UR-MCNC: NEGATIVE — SIGNIFICANT CHANGE UP
BLD GP AB SCN SERPL QL: NEGATIVE — SIGNIFICANT CHANGE UP
BLD GP AB SCN SERPL QL: NEGATIVE — SIGNIFICANT CHANGE UP
BUN SERPL-MCNC: 10 MG/DL — SIGNIFICANT CHANGE UP (ref 7–23)
BUN SERPL-MCNC: 9 MG/DL — SIGNIFICANT CHANGE UP (ref 7–23)
CALCIUM SERPL-MCNC: 10.3 MG/DL — SIGNIFICANT CHANGE UP (ref 8.4–10.5)
CALCIUM SERPL-MCNC: 9.7 MG/DL — SIGNIFICANT CHANGE UP (ref 8.4–10.5)
CHLORIDE SERPL-SCNC: 103 MMOL/L — SIGNIFICANT CHANGE UP (ref 96–108)
CHLORIDE SERPL-SCNC: 104 MMOL/L — SIGNIFICANT CHANGE UP (ref 96–108)
CO2 SERPL-SCNC: 17 MMOL/L — LOW (ref 22–31)
CO2 SERPL-SCNC: 18 MMOL/L — LOW (ref 22–31)
COLOR SPEC: SIGNIFICANT CHANGE UP
CREAT ?TM UR-MCNC: 77 MG/DL — SIGNIFICANT CHANGE UP
CREAT SERPL-MCNC: 0.52 MG/DL — SIGNIFICANT CHANGE UP (ref 0.5–1.3)
CREAT SERPL-MCNC: 0.53 MG/DL — SIGNIFICANT CHANGE UP (ref 0.5–1.3)
DIFF PNL FLD: NEGATIVE — SIGNIFICANT CHANGE UP
EOSINOPHIL # BLD AUTO: 0.04 K/UL — SIGNIFICANT CHANGE UP (ref 0–0.5)
EOSINOPHIL # BLD AUTO: 0.05 K/UL — SIGNIFICANT CHANGE UP (ref 0–0.5)
EOSINOPHIL NFR BLD AUTO: 0.4 % — SIGNIFICANT CHANGE UP (ref 0–6)
EOSINOPHIL NFR BLD AUTO: 0.5 % — SIGNIFICANT CHANGE UP (ref 0–6)
EPI CELLS # UR: 2 /HPF — SIGNIFICANT CHANGE UP
FIBRINOGEN PPP-MCNC: 623 MG/DL — HIGH (ref 290–520)
GLUCOSE BLDC GLUCOMTR-MCNC: 81 MG/DL — SIGNIFICANT CHANGE UP (ref 70–99)
GLUCOSE SERPL-MCNC: 78 MG/DL — SIGNIFICANT CHANGE UP (ref 70–99)
GLUCOSE SERPL-MCNC: 86 MG/DL — SIGNIFICANT CHANGE UP (ref 70–99)
GLUCOSE UR QL: NEGATIVE — SIGNIFICANT CHANGE UP
HBV SURFACE AG SERPL QL IA: SIGNIFICANT CHANGE UP
HCT VFR BLD CALC: 36.4 % — SIGNIFICANT CHANGE UP (ref 34.5–45)
HCT VFR BLD CALC: 38 % — SIGNIFICANT CHANGE UP (ref 34.5–45)
HGB BLD-MCNC: 12.2 G/DL — SIGNIFICANT CHANGE UP (ref 11.5–15.5)
HGB BLD-MCNC: 12.3 G/DL — SIGNIFICANT CHANGE UP (ref 11.5–15.5)
HYALINE CASTS # UR AUTO: 1 /LPF — SIGNIFICANT CHANGE UP (ref 0–2)
IMM GRANULOCYTES NFR BLD AUTO: 0.7 % — SIGNIFICANT CHANGE UP (ref 0–1.5)
IMM GRANULOCYTES NFR BLD AUTO: 0.8 % — SIGNIFICANT CHANGE UP (ref 0–1.5)
INR BLD: 0.82 RATIO — LOW (ref 0.88–1.16)
KETONES UR-MCNC: NEGATIVE — SIGNIFICANT CHANGE UP
LDH SERPL L TO P-CCNC: 145 U/L — SIGNIFICANT CHANGE UP (ref 50–242)
LDH SERPL L TO P-CCNC: 158 U/L — SIGNIFICANT CHANGE UP (ref 50–242)
LEUKOCYTE ESTERASE UR-ACNC: NEGATIVE — SIGNIFICANT CHANGE UP
LYMPHOCYTES # BLD AUTO: 2.22 K/UL — SIGNIFICANT CHANGE UP (ref 1–3.3)
LYMPHOCYTES # BLD AUTO: 2.28 K/UL — SIGNIFICANT CHANGE UP (ref 1–3.3)
LYMPHOCYTES # BLD AUTO: 20.8 % — SIGNIFICANT CHANGE UP (ref 13–44)
LYMPHOCYTES # BLD AUTO: 20.8 % — SIGNIFICANT CHANGE UP (ref 13–44)
MAGNESIUM SERPL-MCNC: 3.9 MG/DL — HIGH (ref 1.6–2.6)
MCHC RBC-ENTMCNC: 29.7 PG — SIGNIFICANT CHANGE UP (ref 27–34)
MCHC RBC-ENTMCNC: 30.3 PG — SIGNIFICANT CHANGE UP (ref 27–34)
MCHC RBC-ENTMCNC: 32.4 GM/DL — SIGNIFICANT CHANGE UP (ref 32–36)
MCHC RBC-ENTMCNC: 33.5 GM/DL — SIGNIFICANT CHANGE UP (ref 32–36)
MCV RBC AUTO: 90.5 FL — SIGNIFICANT CHANGE UP (ref 80–100)
MCV RBC AUTO: 91.8 FL — SIGNIFICANT CHANGE UP (ref 80–100)
MONOCYTES # BLD AUTO: 0.92 K/UL — HIGH (ref 0–0.9)
MONOCYTES # BLD AUTO: 1.15 K/UL — HIGH (ref 0–0.9)
MONOCYTES NFR BLD AUTO: 10.5 % — SIGNIFICANT CHANGE UP (ref 2–14)
MONOCYTES NFR BLD AUTO: 8.6 % — SIGNIFICANT CHANGE UP (ref 2–14)
NEUTROPHILS # BLD AUTO: 7.38 K/UL — SIGNIFICANT CHANGE UP (ref 1.8–7.4)
NEUTROPHILS # BLD AUTO: 7.38 K/UL — SIGNIFICANT CHANGE UP (ref 1.8–7.4)
NEUTROPHILS NFR BLD AUTO: 67.1 % — SIGNIFICANT CHANGE UP (ref 43–77)
NEUTROPHILS NFR BLD AUTO: 69.2 % — SIGNIFICANT CHANGE UP (ref 43–77)
NITRITE UR-MCNC: NEGATIVE — SIGNIFICANT CHANGE UP
NRBC # BLD: 0 /100 WBCS — SIGNIFICANT CHANGE UP (ref 0–0)
NRBC # BLD: 0 /100 WBCS — SIGNIFICANT CHANGE UP (ref 0–0)
PH UR: 6.5 — SIGNIFICANT CHANGE UP (ref 5–8)
PLATELET # BLD AUTO: 250 K/UL — SIGNIFICANT CHANGE UP (ref 150–400)
PLATELET # BLD AUTO: 254 K/UL — SIGNIFICANT CHANGE UP (ref 150–400)
POTASSIUM SERPL-MCNC: 4 MMOL/L — SIGNIFICANT CHANGE UP (ref 3.5–5.3)
POTASSIUM SERPL-MCNC: 4.2 MMOL/L — SIGNIFICANT CHANGE UP (ref 3.5–5.3)
POTASSIUM SERPL-SCNC: 4 MMOL/L — SIGNIFICANT CHANGE UP (ref 3.5–5.3)
POTASSIUM SERPL-SCNC: 4.2 MMOL/L — SIGNIFICANT CHANGE UP (ref 3.5–5.3)
PROT ?TM UR-MCNC: 46 MG/DL — HIGH (ref 0–12)
PROT SERPL-MCNC: 6.2 G/DL — SIGNIFICANT CHANGE UP (ref 6–8.3)
PROT SERPL-MCNC: 6.5 G/DL — SIGNIFICANT CHANGE UP (ref 6–8.3)
PROT UR-MCNC: ABNORMAL
PROT/CREAT UR-RTO: 0.6 RATIO — HIGH (ref 0–0.2)
PROTHROM AB SERPL-ACNC: 9.9 SEC — LOW (ref 10.6–13.6)
RBC # BLD: 4.02 M/UL — SIGNIFICANT CHANGE UP (ref 3.8–5.2)
RBC # BLD: 4.14 M/UL — SIGNIFICANT CHANGE UP (ref 3.8–5.2)
RBC # FLD: 13.5 % — SIGNIFICANT CHANGE UP (ref 10.3–14.5)
RBC # FLD: 13.7 % — SIGNIFICANT CHANGE UP (ref 10.3–14.5)
RBC CASTS # UR COMP ASSIST: 1 /HPF — SIGNIFICANT CHANGE UP (ref 0–4)
RH IG SCN BLD-IMP: POSITIVE — SIGNIFICANT CHANGE UP
RH IG SCN BLD-IMP: POSITIVE — SIGNIFICANT CHANGE UP
SARS-COV-2 RNA SPEC QL NAA+PROBE: SIGNIFICANT CHANGE UP
SODIUM SERPL-SCNC: 135 MMOL/L — SIGNIFICANT CHANGE UP (ref 135–145)
SODIUM SERPL-SCNC: 138 MMOL/L — SIGNIFICANT CHANGE UP (ref 135–145)
SP GR SPEC: 1.02 — SIGNIFICANT CHANGE UP (ref 1.01–1.02)
URATE SERPL-MCNC: 7.1 MG/DL — HIGH (ref 2.5–7)
URATE SERPL-MCNC: 7.3 MG/DL — HIGH (ref 2.5–7)
UROBILINOGEN FLD QL: NEGATIVE — SIGNIFICANT CHANGE UP
WBC # BLD: 10.66 K/UL — HIGH (ref 3.8–10.5)
WBC # BLD: 10.98 K/UL — HIGH (ref 3.8–10.5)
WBC # FLD AUTO: 10.66 K/UL — HIGH (ref 3.8–10.5)
WBC # FLD AUTO: 10.98 K/UL — HIGH (ref 3.8–10.5)
WBC UR QL: 3 /HPF — SIGNIFICANT CHANGE UP (ref 0–5)

## 2020-12-08 PROCEDURE — 99211 OFF/OP EST MAY X REQ PHY/QHP: CPT

## 2020-12-08 PROCEDURE — 99072 ADDL SUPL MATRL&STAF TM PHE: CPT

## 2020-12-08 PROCEDURE — G0463: CPT

## 2020-12-08 PROCEDURE — 99222 1ST HOSP IP/OBS MODERATE 55: CPT

## 2020-12-08 RX ORDER — SODIUM CHLORIDE 9 MG/ML
1000 INJECTION INTRAMUSCULAR; INTRAVENOUS; SUBCUTANEOUS
Refills: 0 | Status: DISCONTINUED | OUTPATIENT
Start: 2020-12-08 | End: 2020-12-11

## 2020-12-08 RX ORDER — CALCIUM CARBONATE 500(1250)
1 TABLET ORAL THREE TIMES A DAY
Refills: 0 | Status: DISCONTINUED | OUTPATIENT
Start: 2020-12-08 | End: 2020-12-15

## 2020-12-08 RX ORDER — SODIUM CHLORIDE 9 MG/ML
1000 INJECTION, SOLUTION INTRAVENOUS
Refills: 0 | Status: DISCONTINUED | OUTPATIENT
Start: 2020-12-08 | End: 2020-12-08

## 2020-12-08 RX ORDER — SODIUM CHLORIDE 9 MG/ML
1000 INJECTION, SOLUTION INTRAVENOUS
Refills: 0 | Status: DISCONTINUED | OUTPATIENT
Start: 2020-12-08 | End: 2020-12-11

## 2020-12-08 RX ORDER — LABETALOL HCL 100 MG
20 TABLET ORAL ONCE
Refills: 0 | Status: COMPLETED | OUTPATIENT
Start: 2020-12-08 | End: 2020-12-08

## 2020-12-08 RX ORDER — MAGNESIUM SULFATE 500 MG/ML
4 VIAL (ML) INJECTION ONCE
Refills: 0 | Status: COMPLETED | OUTPATIENT
Start: 2020-12-08 | End: 2020-12-08

## 2020-12-08 RX ORDER — SODIUM CHLORIDE 9 MG/ML
1000 INJECTION, SOLUTION INTRAVENOUS
Refills: 0 | Status: DISCONTINUED | OUTPATIENT
Start: 2020-12-08 | End: 2020-12-15

## 2020-12-08 RX ORDER — OXYTOCIN 10 UNIT/ML
333.33 VIAL (ML) INJECTION
Qty: 20 | Refills: 0 | Status: DISCONTINUED | OUTPATIENT
Start: 2020-12-08 | End: 2020-12-11

## 2020-12-08 RX ORDER — AMPICILLIN TRIHYDRATE 250 MG
1 CAPSULE ORAL EVERY 4 HOURS
Refills: 0 | Status: ACTIVE | OUTPATIENT
Start: 2020-12-08 | End: 2021-11-06

## 2020-12-08 RX ORDER — LABETALOL HCL 100 MG
40 TABLET ORAL ONCE
Refills: 0 | Status: COMPLETED | OUTPATIENT
Start: 2020-12-08 | End: 2020-12-08

## 2020-12-08 RX ORDER — LABETALOL HCL 100 MG
200 TABLET ORAL EVERY 8 HOURS
Refills: 0 | Status: DISCONTINUED | OUTPATIENT
Start: 2020-12-08 | End: 2020-12-12

## 2020-12-08 RX ORDER — LABETALOL HCL 100 MG
20 TABLET ORAL ONCE
Refills: 0 | Status: DISCONTINUED | OUTPATIENT
Start: 2020-12-08 | End: 2020-12-08

## 2020-12-08 RX ORDER — MAGNESIUM SULFATE 500 MG/ML
2 VIAL (ML) INJECTION
Qty: 40 | Refills: 0 | Status: DISCONTINUED | OUTPATIENT
Start: 2020-12-08 | End: 2020-12-11

## 2020-12-08 RX ORDER — CITRIC ACID/SODIUM CITRATE 300-500 MG
15 SOLUTION, ORAL ORAL EVERY 6 HOURS
Refills: 0 | Status: ACTIVE | OUTPATIENT
Start: 2020-12-08 | End: 2021-11-06

## 2020-12-08 RX ORDER — AMPICILLIN TRIHYDRATE 250 MG
2 CAPSULE ORAL ONCE
Refills: 0 | Status: COMPLETED | OUTPATIENT
Start: 2020-12-08 | End: 2020-12-08

## 2020-12-08 RX ADMIN — Medication 20 MILLIGRAM(S): at 17:39

## 2020-12-08 RX ADMIN — Medication 40 MILLIGRAM(S): at 17:59

## 2020-12-08 RX ADMIN — Medication 50 GM/HR: at 18:34

## 2020-12-08 RX ADMIN — Medication 216 GRAM(S): at 19:47

## 2020-12-08 RX ADMIN — Medication 200 GRAM(S): at 18:13

## 2020-12-08 RX ADMIN — Medication 200 MILLIGRAM(S): at 19:04

## 2020-12-08 RX ADMIN — Medication 108 GRAM(S): at 23:45

## 2020-12-08 RX ADMIN — Medication 15 MILLILITER(S): at 19:51

## 2020-12-08 RX ADMIN — SODIUM CHLORIDE 125 MILLILITER(S): 9 INJECTION, SOLUTION INTRAVENOUS at 18:13

## 2020-12-08 NOTE — OB PROVIDER LABOR PROGRESS NOTE - NS_SUBJECTIVE/OBJECTIVE_OBGYN_ALL_OB_FT
At bedside to attempt placement. Patient noted to be 2cm externally dilated and 0.5cm internal os. She is luanne too often for vaginal cytotec. She is feeling well.

## 2020-12-08 NOTE — OB PROVIDER LABOR PROGRESS NOTE - ASSESSMENT
36yo  at 35w6d with siPEC on Mag with GDMA2 here for IOL 2/2 severe range BPs. Patient luanne too often for vaginal cytotec, will switch to po. Balloon was unable to be placed as cervix is extremely long and sharply curved anteriorly. Can attempt later when more dilated. Will c/w IOL. Fetal status is reassuring.    D/w Dr. Kailee De Paz PGY-3

## 2020-12-08 NOTE — OB PROVIDER H&P - HISTORY OF PRESENT ILLNESS
OB PA Admission Note    34yo  w/ pregestational hypertension- not currently on medications now @36w2d  presents with elevated BP so 171/111. Reports BPs intermittently elevated at home of 150s-160s/100s-110s. Denies HA, dizziness, CP, SOB, visual disturbance, n/V, RUQ pain.     –VB, -LOF, -Ctx, +FM.  PNC: cHTN no medication  GBS: unk  EFW: 2900  ObHx: denies  GynHx: denies  MedHx: cHTN, previously was on HCTZ and Propranolol but was d/c during pregnancy  SrgHx: denies  PsychHx: denies  SocialHx: denies OB PA Admission Note    36yo  w/ pregestational hypertension- not currently on medications now @36w2d  presents with elevated BP so 171/111. Reports BPs intermittently elevated at home of 150s-160s/100s-110s. Denies HA, dizziness, CP, SOB, visual disturbance, n/V, RUQ pain.   Pt reported she was breech presentation    –VB, -LOF, -Ctx, +FM.  PNC: cHTN no medication  GBS: unk  EFW: 2900  ObHx: denies  GynHx: denies  MedHx: cHTN, previously was on HCTZ and Propranolol but was d/c during pregnancy  SrgHx: denies  PsychHx: denies  SocialHx: denies

## 2020-12-08 NOTE — OB PROVIDER LABOR PROGRESS NOTE - NS_SUBJECTIVE/OBJECTIVE_OBGYN_ALL_OB_FT
At bedside to place VC#1. Patient feels well. GBS+ will need amp. Patient failed 1hr , did not have 3hr. Will treat as A2. Expressed understanding.

## 2020-12-08 NOTE — OB PST NOTE - NS_CIRCUMCISIONPLAN_OBGYN_ALL_OB
July 31, 2020       No Pcp Neede      Patient: Vijay Arshad   YOB: 1988   Date of Visit: 7/31/2020       Dear  Pcp:    Thank you for referring Vijay Arshad to me for evaluation. Below are my notes for this visit with him.    If you have questions, please do not hesitate to call me. I look forward to following your patient along with you.      Sincerely,        Annalise Patel MD        CC: No Recipients  Annalise Patel MD  7/31/2020  4:34 PM  Sign when Signing Visit  Cardiology Office Visit    Chief Complaint/Reason for Visit:   Chief Complaint   Patient presents with   • Consultation     Palpitations         HISTORY OF PRESENT ILLNESS: Vijay Arshad is a 32 year old male with    Hypertension  Palpitations    Pt reports feeling his heart beating when he goes to bed.No racing heart beat.No orthopnea or NPDs.    He describes exertional dyspnea and worries having heart disease.No exertional CP.    Lost 20lbs and BP improving    REVIEW OF SYSTEMS:  All other systems negative, except as noted above in HPI    PAST MEDICAL HISTORY:   Past Medical History:   Diagnosis Date   • Essential (primary) hypertension    • Palpitations        PAST SURGICAL HISTORY:   Past Surgical History:   Procedure Laterality Date   • No past surgeries         FAMILY HISTORY:   Family History   Problem Relation Age of Onset   • Patient is unaware of any medical problems Mother    • Patient is unaware of any medical problems Father        SOCIAL HISTORY:   Social History     Tobacco Use   • Smoking status: Never Smoker   • Smokeless tobacco: Never Used   Substance Use Topics   • Alcohol use: Not Currently   • Drug use: Not Currently       Drug Use:    Not Current*      ALLERGIES:   ALLERGIES:  No Known Allergies    MEDICATIONS:   Current Medications    HYDROCODONE-ACETAMINOPHEN (NORCO)  MG PER TABLET    0.5-1 tablet by mouth every 8-12 hours as needed for severe pain. Continue to decrease use of narcotic pain  medicine as able    LEFLUNOMIDE (ARAVA) 20 MG TABLET    Take 20 mg by mouth.        PHYSICAL  EXAMINATION  Visit Vitals  /88 (BP Location: LUE - Left upper extremity, Patient Position: Sitting, Cuff Size: Regular)   Pulse 88         Physical Exam   Constitutional: He appears well-developed.   HENT:   Head: Normocephalic.   Eyes: EOM are normal.   Neck: Neck supple.   Cardiovascular: Normal rate.   Pulmonary/Chest: Breath sounds normal.   Abdominal: Soft.   Musculoskeletal: Normal range of motion.   Neurological: He is alert.   Skin: Skin is warm.   Nursing note and vitals reviewed.        Labs: Reviewed     No results found for: FSTS, CHOLESTEROL, CALCLDL, HDL, TRIGLYCERIDE, NONHDLCHOL, CHOHDL       IMPRESSION/PLAN:    Essential (primary) hypertension  Goal BP,130/90    Exertional dyspnea  Echo 2017 reviewed  WNL  Stress test ordered    Palpitations  Reassurance   Blood tests ordered      Orders Placed This Encounter   • Comprehensive Metabolic Panel   • Thyroid Stimulating Hormone Reflex   • CBC with Automated Differential   • Lipid Panel With Reflex   • losartan (COZAAR) 50 MG tablet     Sig: Take 1 tablet by mouth daily.     Dispense:  90 tablet     Refill:  3   • Stress test only     Order Specific Question:   Enter the preferred method to stress the patient?     Answer:   Exercise Graded Treadmill (Ryan Protocol)     Order Specific Question:   Is the patient able to ambulate at an incline on a treadmill?     Answer:   Yes         7/31/2020      Jay Patel MD  Cardiology            No

## 2020-12-08 NOTE — OB PROVIDER IHI INDUCTION/AUGMENTATION NOTE - NS_IHIMETHOD_OBGYN_ALL_OB
Cytotec Vaginal/CRB, Cervical Ripening Balloon Cytotec PO/Cytotec Vaginal/CRB, Cervical Ripening Balloon

## 2020-12-08 NOTE — OB PST NOTE - HISTORY OF PRESENT ILLNESS
35 year old female presents at 37 weeks gestation to preadmission testing for scheduled primary  for breech position and preeclampsia. LMP 3/26/2020, last abdominal sonogram done 12/3/2020. Patient with h/o hypertension, not on any medication, patient is in a study group, states was told will put back on antihypertensive medication if pressure not under control. Patient alert and responsive to all stimuli. Patient was in the hospital last 2020 for observation due to headache that lasted 24 hours. Patient denies any headaches at present, no n/v, no blurred vision. Blood pressure at [facility] is 162/113. OB office notified, spoke to Courtney, will inform doctor Kailee, instructed to take patient to Labor and delivery for observation.    covid pcr [id number] done today per patient

## 2020-12-08 NOTE — OB PROVIDER H&P - ASSESSMENT
A/P: 36 yo P0 @ 36w5d w/ super-imposed severe PEC s/p IVP labetalol x 2  - admit to L&D  - routine labs  - NPO  - IV hydration  - fetus reassurign NST, continuous monitoring, breech presentation  - magnesium gtt  - HELLP labs  - likely for  delivery due to elevated BPs    will d/w Dr Kailee Amaro ,PAC   A/P: 34 yo P0 @ 36w5d w/ super-imposed severe PEC s/p IVP labetalol x 2  - admit to L&D  - routine labs  - NPO  - IV hydration  - fetus reassurign NST, continuous monitoring  - magnesium gtt  - HELLP labs  - likely for  delivery due to elevated BPs  - to discuss delivery options with Attending - primary c/s vs IOL for vaginal delivery    will d/w Dr Kailee Amaro ,PAC

## 2020-12-08 NOTE — OB PROVIDER H&P - NSHPPHYSICALEXAM_GEN_ALL_CORE
Gen: NAD  Heart: S1S2 RRR  Lungs: CTA b/l  Abd: gravid nontender  LE: no calf tenderness 2+edema Gen: NAD  Heart: S1S2 RRR  Lungs: CTA b/l  Abd: gravid nontender  LE: no calf tenderness 2+edema    Sono: vertex

## 2020-12-08 NOTE — OB PST NOTE - PROBLEM SELECTOR PLAN 2
scheduled for primary  on 12/10/2020  both verbal and written preop instruction given and patient verbalized understanding.  patient to use chlorhexidine wash per protocol

## 2020-12-09 ENCOUNTER — TRANSCRIPTION ENCOUNTER (OUTPATIENT)
Age: 35
End: 2020-12-09

## 2020-12-09 LAB
ALBUMIN SERPL ELPH-MCNC: 3.2 G/DL — LOW (ref 3.3–5)
ALBUMIN SERPL ELPH-MCNC: 3.3 G/DL — SIGNIFICANT CHANGE UP (ref 3.3–5)
ALP SERPL-CCNC: 127 U/L — HIGH (ref 40–120)
ALP SERPL-CCNC: 128 U/L — HIGH (ref 40–120)
ALP SERPL-CCNC: 130 U/L — HIGH (ref 40–120)
ALP SERPL-CCNC: 135 U/L — HIGH (ref 40–120)
ALT FLD-CCNC: 10 U/L — SIGNIFICANT CHANGE UP (ref 10–45)
ALT FLD-CCNC: 11 U/L — SIGNIFICANT CHANGE UP (ref 10–45)
ALT FLD-CCNC: 13 U/L — SIGNIFICANT CHANGE UP (ref 10–45)
ALT FLD-CCNC: 9 U/L — LOW (ref 10–45)
ANION GAP SERPL CALC-SCNC: 12 MMOL/L — SIGNIFICANT CHANGE UP (ref 5–17)
ANION GAP SERPL CALC-SCNC: 12 MMOL/L — SIGNIFICANT CHANGE UP (ref 5–17)
ANION GAP SERPL CALC-SCNC: 14 MMOL/L — SIGNIFICANT CHANGE UP (ref 5–17)
ANION GAP SERPL CALC-SCNC: 16 MMOL/L — SIGNIFICANT CHANGE UP (ref 5–17)
APTT BLD: 22.5 SEC — LOW (ref 27.5–35.5)
APTT BLD: 24.7 SEC — LOW (ref 27.5–35.5)
APTT BLD: 25.3 SEC — LOW (ref 27.5–35.5)
APTT BLD: 25.6 SEC — LOW (ref 27.5–35.5)
APTT BLD: 25.7 SEC — LOW (ref 27.5–35.5)
AST SERPL-CCNC: 12 U/L — SIGNIFICANT CHANGE UP (ref 10–40)
AST SERPL-CCNC: 14 U/L — SIGNIFICANT CHANGE UP (ref 10–40)
AST SERPL-CCNC: 15 U/L — SIGNIFICANT CHANGE UP (ref 10–40)
AST SERPL-CCNC: 16 U/L — SIGNIFICANT CHANGE UP (ref 10–40)
BASOPHILS # BLD AUTO: 0.03 K/UL — SIGNIFICANT CHANGE UP (ref 0–0.2)
BASOPHILS # BLD AUTO: 0.03 K/UL — SIGNIFICANT CHANGE UP (ref 0–0.2)
BASOPHILS # BLD AUTO: 0.04 K/UL — SIGNIFICANT CHANGE UP (ref 0–0.2)
BASOPHILS # BLD AUTO: 0.04 K/UL — SIGNIFICANT CHANGE UP (ref 0–0.2)
BASOPHILS NFR BLD AUTO: 0.3 % — SIGNIFICANT CHANGE UP (ref 0–2)
BASOPHILS NFR BLD AUTO: 0.4 % — SIGNIFICANT CHANGE UP (ref 0–2)
BASOPHILS NFR BLD AUTO: 0.5 % — SIGNIFICANT CHANGE UP (ref 0–2)
BASOPHILS NFR BLD AUTO: 0.5 % — SIGNIFICANT CHANGE UP (ref 0–2)
BILIRUB SERPL-MCNC: 0.2 MG/DL — SIGNIFICANT CHANGE UP (ref 0.2–1.2)
BUN SERPL-MCNC: 8 MG/DL — SIGNIFICANT CHANGE UP (ref 7–23)
BUN SERPL-MCNC: 8 MG/DL — SIGNIFICANT CHANGE UP (ref 7–23)
BUN SERPL-MCNC: 9 MG/DL — SIGNIFICANT CHANGE UP (ref 7–23)
BUN SERPL-MCNC: 9 MG/DL — SIGNIFICANT CHANGE UP (ref 7–23)
CALCIUM SERPL-MCNC: 8.1 MG/DL — LOW (ref 8.4–10.5)
CALCIUM SERPL-MCNC: 8.2 MG/DL — LOW (ref 8.4–10.5)
CALCIUM SERPL-MCNC: 8.5 MG/DL — SIGNIFICANT CHANGE UP (ref 8.4–10.5)
CALCIUM SERPL-MCNC: 9 MG/DL — SIGNIFICANT CHANGE UP (ref 8.4–10.5)
CHLORIDE SERPL-SCNC: 101 MMOL/L — SIGNIFICANT CHANGE UP (ref 96–108)
CHLORIDE SERPL-SCNC: 103 MMOL/L — SIGNIFICANT CHANGE UP (ref 96–108)
CHLORIDE SERPL-SCNC: 103 MMOL/L — SIGNIFICANT CHANGE UP (ref 96–108)
CHLORIDE SERPL-SCNC: 105 MMOL/L — SIGNIFICANT CHANGE UP (ref 96–108)
CO2 SERPL-SCNC: 18 MMOL/L — LOW (ref 22–31)
CO2 SERPL-SCNC: 19 MMOL/L — LOW (ref 22–31)
CREAT SERPL-MCNC: 0.53 MG/DL — SIGNIFICANT CHANGE UP (ref 0.5–1.3)
CREAT SERPL-MCNC: 0.63 MG/DL — SIGNIFICANT CHANGE UP (ref 0.5–1.3)
CREAT SERPL-MCNC: 0.67 MG/DL — SIGNIFICANT CHANGE UP (ref 0.5–1.3)
CREAT SERPL-MCNC: 0.69 MG/DL — SIGNIFICANT CHANGE UP (ref 0.5–1.3)
EOSINOPHIL # BLD AUTO: 0.04 K/UL — SIGNIFICANT CHANGE UP (ref 0–0.5)
EOSINOPHIL # BLD AUTO: 0.04 K/UL — SIGNIFICANT CHANGE UP (ref 0–0.5)
EOSINOPHIL # BLD AUTO: 0.05 K/UL — SIGNIFICANT CHANGE UP (ref 0–0.5)
EOSINOPHIL # BLD AUTO: 0.07 K/UL — SIGNIFICANT CHANGE UP (ref 0–0.5)
EOSINOPHIL NFR BLD AUTO: 0.4 % — SIGNIFICANT CHANGE UP (ref 0–6)
EOSINOPHIL NFR BLD AUTO: 0.5 % — SIGNIFICANT CHANGE UP (ref 0–6)
EOSINOPHIL NFR BLD AUTO: 0.6 % — SIGNIFICANT CHANGE UP (ref 0–6)
EOSINOPHIL NFR BLD AUTO: 0.9 % — SIGNIFICANT CHANGE UP (ref 0–6)
FIBRINOGEN PPP-MCNC: 609 MG/DL — HIGH (ref 290–520)
FIBRINOGEN PPP-MCNC: 610 MG/DL — HIGH (ref 290–520)
FIBRINOGEN PPP-MCNC: 617 MG/DL — HIGH (ref 290–520)
FIBRINOGEN PPP-MCNC: 624 MG/DL — HIGH (ref 290–520)
FIBRINOGEN PPP-MCNC: 679 MG/DL — HIGH (ref 290–520)
GLUCOSE BLDC GLUCOMTR-MCNC: 73 MG/DL — SIGNIFICANT CHANGE UP (ref 70–99)
GLUCOSE BLDC GLUCOMTR-MCNC: 82 MG/DL — SIGNIFICANT CHANGE UP (ref 70–99)
GLUCOSE BLDC GLUCOMTR-MCNC: 84 MG/DL — SIGNIFICANT CHANGE UP (ref 70–99)
GLUCOSE BLDC GLUCOMTR-MCNC: 85 MG/DL — SIGNIFICANT CHANGE UP (ref 70–99)
GLUCOSE BLDC GLUCOMTR-MCNC: 86 MG/DL — SIGNIFICANT CHANGE UP (ref 70–99)
GLUCOSE BLDC GLUCOMTR-MCNC: 87 MG/DL — SIGNIFICANT CHANGE UP (ref 70–99)
GLUCOSE BLDC GLUCOMTR-MCNC: 89 MG/DL — SIGNIFICANT CHANGE UP (ref 70–99)
GLUCOSE SERPL-MCNC: 78 MG/DL — SIGNIFICANT CHANGE UP (ref 70–99)
GLUCOSE SERPL-MCNC: 84 MG/DL — SIGNIFICANT CHANGE UP (ref 70–99)
GLUCOSE SERPL-MCNC: 86 MG/DL — SIGNIFICANT CHANGE UP (ref 70–99)
GLUCOSE SERPL-MCNC: 86 MG/DL — SIGNIFICANT CHANGE UP (ref 70–99)
HCT VFR BLD CALC: 35.3 % — SIGNIFICANT CHANGE UP (ref 34.5–45)
HCT VFR BLD CALC: 35.4 % — SIGNIFICANT CHANGE UP (ref 34.5–45)
HCT VFR BLD CALC: 35.7 % — SIGNIFICANT CHANGE UP (ref 34.5–45)
HCT VFR BLD CALC: 36.7 % — SIGNIFICANT CHANGE UP (ref 34.5–45)
HGB BLD-MCNC: 11.5 G/DL — SIGNIFICANT CHANGE UP (ref 11.5–15.5)
HGB BLD-MCNC: 11.8 G/DL — SIGNIFICANT CHANGE UP (ref 11.5–15.5)
IMM GRANULOCYTES NFR BLD AUTO: 0.2 % — SIGNIFICANT CHANGE UP (ref 0–1.5)
IMM GRANULOCYTES NFR BLD AUTO: 0.4 % — SIGNIFICANT CHANGE UP (ref 0–1.5)
IMM GRANULOCYTES NFR BLD AUTO: 0.5 % — SIGNIFICANT CHANGE UP (ref 0–1.5)
IMM GRANULOCYTES NFR BLD AUTO: 0.6 % — SIGNIFICANT CHANGE UP (ref 0–1.5)
INR BLD: 0.78 RATIO — LOW (ref 0.88–1.16)
INR BLD: 0.83 RATIO — LOW (ref 0.88–1.16)
INR BLD: 0.83 RATIO — LOW (ref 0.88–1.16)
INR BLD: 0.84 RATIO — LOW (ref 0.88–1.16)
LDH SERPL L TO P-CCNC: 142 U/L — SIGNIFICANT CHANGE UP (ref 50–242)
LDH SERPL L TO P-CCNC: 143 U/L — SIGNIFICANT CHANGE UP (ref 50–242)
LDH SERPL L TO P-CCNC: 151 U/L — SIGNIFICANT CHANGE UP (ref 50–242)
LDH SERPL L TO P-CCNC: 152 U/L — SIGNIFICANT CHANGE UP (ref 50–242)
LYMPHOCYTES # BLD AUTO: 1.68 K/UL — SIGNIFICANT CHANGE UP (ref 1–3.3)
LYMPHOCYTES # BLD AUTO: 1.81 K/UL — SIGNIFICANT CHANGE UP (ref 1–3.3)
LYMPHOCYTES # BLD AUTO: 1.97 K/UL — SIGNIFICANT CHANGE UP (ref 1–3.3)
LYMPHOCYTES # BLD AUTO: 19.3 % — SIGNIFICANT CHANGE UP (ref 13–44)
LYMPHOCYTES # BLD AUTO: 2.16 K/UL — SIGNIFICANT CHANGE UP (ref 1–3.3)
LYMPHOCYTES # BLD AUTO: 22.2 % — SIGNIFICANT CHANGE UP (ref 13–44)
LYMPHOCYTES # BLD AUTO: 23.8 % — SIGNIFICANT CHANGE UP (ref 13–44)
LYMPHOCYTES # BLD AUTO: 23.8 % — SIGNIFICANT CHANGE UP (ref 13–44)
MAGNESIUM SERPL-MCNC: 4.5 MG/DL — HIGH (ref 1.6–2.6)
MAGNESIUM SERPL-MCNC: 5 MG/DL — HIGH (ref 1.6–2.6)
MAGNESIUM SERPL-MCNC: 5.1 MG/DL — HIGH (ref 1.6–2.6)
MAGNESIUM SERPL-MCNC: 5.2 MG/DL — HIGH (ref 1.6–2.6)
MCHC RBC-ENTMCNC: 29.6 PG — SIGNIFICANT CHANGE UP (ref 27–34)
MCHC RBC-ENTMCNC: 29.7 PG — SIGNIFICANT CHANGE UP (ref 27–34)
MCHC RBC-ENTMCNC: 29.8 PG — SIGNIFICANT CHANGE UP (ref 27–34)
MCHC RBC-ENTMCNC: 29.9 PG — SIGNIFICANT CHANGE UP (ref 27–34)
MCHC RBC-ENTMCNC: 32.2 GM/DL — SIGNIFICANT CHANGE UP (ref 32–36)
MCHC RBC-ENTMCNC: 32.2 GM/DL — SIGNIFICANT CHANGE UP (ref 32–36)
MCHC RBC-ENTMCNC: 32.5 GM/DL — SIGNIFICANT CHANGE UP (ref 32–36)
MCHC RBC-ENTMCNC: 32.6 GM/DL — SIGNIFICANT CHANGE UP (ref 32–36)
MCV RBC AUTO: 90.7 FL — SIGNIFICANT CHANGE UP (ref 80–100)
MCV RBC AUTO: 92.2 FL — SIGNIFICANT CHANGE UP (ref 80–100)
MCV RBC AUTO: 92.2 FL — SIGNIFICANT CHANGE UP (ref 80–100)
MCV RBC AUTO: 92.7 FL — SIGNIFICANT CHANGE UP (ref 80–100)
MONOCYTES # BLD AUTO: 0.64 K/UL — SIGNIFICANT CHANGE UP (ref 0–0.9)
MONOCYTES # BLD AUTO: 0.92 K/UL — HIGH (ref 0–0.9)
MONOCYTES # BLD AUTO: 0.95 K/UL — HIGH (ref 0–0.9)
MONOCYTES # BLD AUTO: 0.98 K/UL — HIGH (ref 0–0.9)
MONOCYTES NFR BLD AUTO: 10.9 % — SIGNIFICANT CHANGE UP (ref 2–14)
MONOCYTES NFR BLD AUTO: 11.9 % — SIGNIFICANT CHANGE UP (ref 2–14)
MONOCYTES NFR BLD AUTO: 8.4 % — SIGNIFICANT CHANGE UP (ref 2–14)
MONOCYTES NFR BLD AUTO: 9.4 % — SIGNIFICANT CHANGE UP (ref 2–14)
NEUTROPHILS # BLD AUTO: 5.01 K/UL — SIGNIFICANT CHANGE UP (ref 1.8–7.4)
NEUTROPHILS # BLD AUTO: 5.22 K/UL — SIGNIFICANT CHANGE UP (ref 1.8–7.4)
NEUTROPHILS # BLD AUTO: 5.95 K/UL — SIGNIFICANT CHANGE UP (ref 1.8–7.4)
NEUTROPHILS # BLD AUTO: 6.54 K/UL — SIGNIFICANT CHANGE UP (ref 1.8–7.4)
NEUTROPHILS NFR BLD AUTO: 63.1 % — SIGNIFICANT CHANGE UP (ref 43–77)
NEUTROPHILS NFR BLD AUTO: 66 % — SIGNIFICANT CHANGE UP (ref 43–77)
NEUTROPHILS NFR BLD AUTO: 67.1 % — SIGNIFICANT CHANGE UP (ref 43–77)
NEUTROPHILS NFR BLD AUTO: 68.3 % — SIGNIFICANT CHANGE UP (ref 43–77)
NRBC # BLD: 0 /100 WBCS — SIGNIFICANT CHANGE UP (ref 0–0)
PLATELET # BLD AUTO: 218 K/UL — SIGNIFICANT CHANGE UP (ref 150–400)
PLATELET # BLD AUTO: 222 K/UL — SIGNIFICANT CHANGE UP (ref 150–400)
PLATELET # BLD AUTO: 235 K/UL — SIGNIFICANT CHANGE UP (ref 150–400)
PLATELET # BLD AUTO: 235 K/UL — SIGNIFICANT CHANGE UP (ref 150–400)
POTASSIUM SERPL-MCNC: 3.9 MMOL/L — SIGNIFICANT CHANGE UP (ref 3.5–5.3)
POTASSIUM SERPL-MCNC: 4 MMOL/L — SIGNIFICANT CHANGE UP (ref 3.5–5.3)
POTASSIUM SERPL-MCNC: 4.2 MMOL/L — SIGNIFICANT CHANGE UP (ref 3.5–5.3)
POTASSIUM SERPL-MCNC: 4.4 MMOL/L — SIGNIFICANT CHANGE UP (ref 3.5–5.3)
POTASSIUM SERPL-SCNC: 3.9 MMOL/L — SIGNIFICANT CHANGE UP (ref 3.5–5.3)
POTASSIUM SERPL-SCNC: 4 MMOL/L — SIGNIFICANT CHANGE UP (ref 3.5–5.3)
POTASSIUM SERPL-SCNC: 4.2 MMOL/L — SIGNIFICANT CHANGE UP (ref 3.5–5.3)
POTASSIUM SERPL-SCNC: 4.4 MMOL/L — SIGNIFICANT CHANGE UP (ref 3.5–5.3)
PROT ?TM UR-MCNC: 50 MG/DL — HIGH (ref 0–12)
PROT SERPL-MCNC: 5.8 G/DL — LOW (ref 6–8.3)
PROT SERPL-MCNC: 5.8 G/DL — LOW (ref 6–8.3)
PROT SERPL-MCNC: 6 G/DL — SIGNIFICANT CHANGE UP (ref 6–8.3)
PROT SERPL-MCNC: 6.1 G/DL — SIGNIFICANT CHANGE UP (ref 6–8.3)
PROTHROM AB SERPL-ACNC: 10 SEC — LOW (ref 10.6–13.6)
PROTHROM AB SERPL-ACNC: 10.1 SEC — LOW (ref 10.6–13.6)
PROTHROM AB SERPL-ACNC: 10.2 SEC — LOW (ref 10.6–13.6)
PROTHROM AB SERPL-ACNC: 9.5 SEC — LOW (ref 10.6–13.6)
RBC # BLD: 3.84 M/UL — SIGNIFICANT CHANGE UP (ref 3.8–5.2)
RBC # BLD: 3.87 M/UL — SIGNIFICANT CHANGE UP (ref 3.8–5.2)
RBC # BLD: 3.89 M/UL — SIGNIFICANT CHANGE UP (ref 3.8–5.2)
RBC # BLD: 3.96 M/UL — SIGNIFICANT CHANGE UP (ref 3.8–5.2)
RBC # FLD: 13.6 % — SIGNIFICANT CHANGE UP (ref 10.3–14.5)
RBC # FLD: 13.7 % — SIGNIFICANT CHANGE UP (ref 10.3–14.5)
RBC # FLD: 13.8 % — SIGNIFICANT CHANGE UP (ref 10.3–14.5)
RBC # FLD: 13.9 % — SIGNIFICANT CHANGE UP (ref 10.3–14.5)
RUBV IGG SER-ACNC: 2.6 INDEX — SIGNIFICANT CHANGE UP
RUBV IGG SER-IMP: POSITIVE — SIGNIFICANT CHANGE UP
SARS-COV-2 IGG SERPL QL IA: NEGATIVE — SIGNIFICANT CHANGE UP
SARS-COV-2 IGM SERPL IA-ACNC: <0.1 INDEX — SIGNIFICANT CHANGE UP
SODIUM SERPL-SCNC: 134 MMOL/L — LOW (ref 135–145)
SODIUM SERPL-SCNC: 134 MMOL/L — LOW (ref 135–145)
SODIUM SERPL-SCNC: 136 MMOL/L — SIGNIFICANT CHANGE UP (ref 135–145)
SODIUM SERPL-SCNC: 137 MMOL/L — SIGNIFICANT CHANGE UP (ref 135–145)
T PALLIDUM AB TITR SER: NEGATIVE — SIGNIFICANT CHANGE UP
URATE SERPL-MCNC: 7.8 MG/DL — HIGH (ref 2.5–7)
URATE SERPL-MCNC: 8 MG/DL — HIGH (ref 2.5–7)
URATE SERPL-MCNC: 8.5 MG/DL — HIGH (ref 2.5–7)
URATE SERPL-MCNC: 8.6 MG/DL — HIGH (ref 2.5–7)
WBC # BLD: 7.6 K/UL — SIGNIFICANT CHANGE UP (ref 3.8–10.5)
WBC # BLD: 8.27 K/UL — SIGNIFICANT CHANGE UP (ref 3.8–10.5)
WBC # BLD: 8.7 K/UL — SIGNIFICANT CHANGE UP (ref 3.8–10.5)
WBC # BLD: 9.75 K/UL — SIGNIFICANT CHANGE UP (ref 3.8–10.5)
WBC # FLD AUTO: 7.6 K/UL — SIGNIFICANT CHANGE UP (ref 3.8–10.5)
WBC # FLD AUTO: 8.27 K/UL — SIGNIFICANT CHANGE UP (ref 3.8–10.5)
WBC # FLD AUTO: 8.7 K/UL — SIGNIFICANT CHANGE UP (ref 3.8–10.5)
WBC # FLD AUTO: 9.75 K/UL — SIGNIFICANT CHANGE UP (ref 3.8–10.5)

## 2020-12-09 PROCEDURE — 99232 SBSQ HOSP IP/OBS MODERATE 35: CPT

## 2020-12-09 RX ORDER — ACETAMINOPHEN 500 MG
1000 TABLET ORAL ONCE
Refills: 0 | Status: COMPLETED | OUTPATIENT
Start: 2020-12-09 | End: 2020-12-09

## 2020-12-09 RX ORDER — ACETAMINOPHEN 500 MG
975 TABLET ORAL ONCE
Refills: 0 | Status: COMPLETED | OUTPATIENT
Start: 2020-12-09 | End: 2020-12-09

## 2020-12-09 RX ADMIN — Medication 108 GRAM(S): at 19:50

## 2020-12-09 RX ADMIN — Medication 200 MILLIGRAM(S): at 03:03

## 2020-12-09 RX ADMIN — Medication 1000 MILLIGRAM(S): at 09:15

## 2020-12-09 RX ADMIN — Medication 975 MILLIGRAM(S): at 23:57

## 2020-12-09 RX ADMIN — Medication 108 GRAM(S): at 07:45

## 2020-12-09 RX ADMIN — Medication 108 GRAM(S): at 11:45

## 2020-12-09 RX ADMIN — Medication 200 MILLIGRAM(S): at 19:30

## 2020-12-09 RX ADMIN — Medication 108 GRAM(S): at 15:45

## 2020-12-09 RX ADMIN — Medication 400 MILLIGRAM(S): at 16:55

## 2020-12-09 RX ADMIN — Medication 975 MILLIGRAM(S): at 00:43

## 2020-12-09 RX ADMIN — Medication 108 GRAM(S): at 03:45

## 2020-12-09 RX ADMIN — Medication 200 MILLIGRAM(S): at 11:48

## 2020-12-09 RX ADMIN — Medication 15 MILLILITER(S): at 03:50

## 2020-12-09 RX ADMIN — Medication 108 GRAM(S): at 23:48

## 2020-12-09 NOTE — OB PROVIDER LABOR PROGRESS NOTE - NS_SUBJECTIVE/OBJECTIVE_OBGYN_ALL_OB_FT
PA note  pt seen for placement of vaginal cytotec. last po cytotec administered 9:10am.  pt resting in bed with epidural in plce denies pain  ve 0/50/-3    plan per Dr Campos

## 2020-12-09 NOTE — OB PROVIDER LABOR PROGRESS NOTE - NS_SUBJECTIVE/OBJECTIVE_OBGYN_ALL_OB_FT
Pt seen for placement of CB. CB placed without incident with 60cc of saline in the uterine balloon and 60cc of saline in the vaginal balloon. Pt tolerated well. Comfortable with epidural in place. Vaginal cytotec placed.    VE: 1/long/high  EFM: 120/moderate variabiltiy/+accels/-decels  TOCO: irregular     A/P: siPEC/Mg IOL  - BPs mild range  - epidural in place  - Vaginal cytotec 3 placed  - CB placed    Dr Andres Bravo PAC

## 2020-12-09 NOTE — PRE-ANESTHESIA EVALUATION ADULT - NSANTHPMHFT_GEN_ALL_CORE
36yo  w/ pregestational hypertension- not currently on medications now @36w2d  presents with elevated BP so 171/111. Reports BPs intermittently elevated at home of 150s-160s/100s-110s. Denies HA, dizziness, CP, SOB, visual disturbance, n/V, RUQ pain.   Pt reported she was breech presentation    pre-eclamptic with severe features, denies lower back issues, denies lower extremity weakness or numbness, denies history of bleeding disorders or blood thinner use.

## 2020-12-09 NOTE — OB PROVIDER LABOR PROGRESS NOTE - ASSESSMENT
36yo  @36w6d IOL for siPEC on Magnesium. 0/50/-3.    Plan:  -EFM/[place]  -Monitor vitals  -Will allow rest before next dose of vaginal cytotec    Ivon Mckinley PA-C

## 2020-12-09 NOTE — CHART NOTE - NSCHARTNOTEFT_GEN_A_CORE
HELLP Labs Stable    CBC  H/H: 11.5/35.7  Platelet: 235    Coags  PT/PTT: 10/22.5  INR: 0.83  Fibrinogen: 609    Chemistry  BUN/Creatinine: 9/0.53  AST/ALT: 15/11  Uric Acid: 7.8  LDH: 142  Magnesium: 4.5    Ivon Mckinley PA-C

## 2020-12-09 NOTE — OB PROVIDER LABOR PROGRESS NOTE - ASSESSMENT
36yo P0 IOL for sPEC on Mag. Cat I tracing.    Plan:  -EFM/[place]  -Monitor vitals  -Considering rest + PO diet w/ anesthesia approval  -Plan as per Dr. Campos (PMD)    Ivon Mckinley PA-C

## 2020-12-09 NOTE — OB PROVIDER LABOR PROGRESS NOTE - ASSESSMENT
siPEC/Mg and A2 IOL:  -EFM/[place]  -Continue IOL with CB and vaginal cytotec  -Continue to monitor VS  -Continue Mg  -Anticipate   Plan per Dr. Andres Giron PA-C

## 2020-12-09 NOTE — OB PROVIDER LABOR PROGRESS NOTE - NS_SUBJECTIVE/OBJECTIVE_OBGYN_ALL_OB_FT
PA Note:  Patient seen and evaluated at bedside for placement of vaginal cytotec. Patient comfortable.    Vaginal cytotec #4 (25mcg) placed without incident. CB still in place

## 2020-12-10 LAB
ALBUMIN SERPL ELPH-MCNC: 3.2 G/DL — LOW (ref 3.3–5)
ALBUMIN SERPL ELPH-MCNC: 3.3 G/DL — SIGNIFICANT CHANGE UP (ref 3.3–5)
ALBUMIN SERPL ELPH-MCNC: 3.9 G/DL — SIGNIFICANT CHANGE UP (ref 3.3–5)
ALP SERPL-CCNC: 141 U/L — HIGH (ref 40–120)
ALP SERPL-CCNC: 145 U/L — HIGH (ref 40–120)
ALP SERPL-CCNC: 179 U/L — HIGH (ref 40–120)
ALT FLD-CCNC: 11 U/L — SIGNIFICANT CHANGE UP (ref 10–45)
ALT FLD-CCNC: 13 U/L — SIGNIFICANT CHANGE UP (ref 10–45)
ALT FLD-CCNC: 7 U/L — LOW (ref 10–45)
ANION GAP SERPL CALC-SCNC: 13 MMOL/L — SIGNIFICANT CHANGE UP (ref 5–17)
ANION GAP SERPL CALC-SCNC: 14 MMOL/L — SIGNIFICANT CHANGE UP (ref 5–17)
ANION GAP SERPL CALC-SCNC: 14 MMOL/L — SIGNIFICANT CHANGE UP (ref 5–17)
APTT BLD: 24.8 SEC — LOW (ref 27.5–35.5)
APTT BLD: 25.6 SEC — LOW (ref 27.5–35.5)
APTT BLD: 27.1 SEC — LOW (ref 27.5–35.5)
AST SERPL-CCNC: 16 U/L — SIGNIFICANT CHANGE UP (ref 10–40)
AST SERPL-CCNC: 16 U/L — SIGNIFICANT CHANGE UP (ref 10–40)
AST SERPL-CCNC: 17 U/L — SIGNIFICANT CHANGE UP (ref 10–40)
BASOPHILS # BLD AUTO: 0 K/UL — SIGNIFICANT CHANGE UP (ref 0–0.2)
BASOPHILS # BLD AUTO: 0.03 K/UL — SIGNIFICANT CHANGE UP (ref 0–0.2)
BASOPHILS # BLD AUTO: 0.03 K/UL — SIGNIFICANT CHANGE UP (ref 0–0.2)
BASOPHILS NFR BLD AUTO: 0 % — SIGNIFICANT CHANGE UP (ref 0–2)
BASOPHILS NFR BLD AUTO: 0.3 % — SIGNIFICANT CHANGE UP (ref 0–2)
BASOPHILS NFR BLD AUTO: 0.4 % — SIGNIFICANT CHANGE UP (ref 0–2)
BILIRUB SERPL-MCNC: 0.2 MG/DL — SIGNIFICANT CHANGE UP (ref 0.2–1.2)
BILIRUB SERPL-MCNC: 0.3 MG/DL — SIGNIFICANT CHANGE UP (ref 0.2–1.2)
BILIRUB SERPL-MCNC: 0.4 MG/DL — SIGNIFICANT CHANGE UP (ref 0.2–1.2)
BUN SERPL-MCNC: 8 MG/DL — SIGNIFICANT CHANGE UP (ref 7–23)
CALCIUM SERPL-MCNC: 8.2 MG/DL — LOW (ref 8.4–10.5)
CALCIUM SERPL-MCNC: 8.3 MG/DL — LOW (ref 8.4–10.5)
CALCIUM SERPL-MCNC: 8.9 MG/DL — SIGNIFICANT CHANGE UP (ref 8.4–10.5)
CHLORIDE SERPL-SCNC: 102 MMOL/L — SIGNIFICANT CHANGE UP (ref 96–108)
CHLORIDE SERPL-SCNC: 104 MMOL/L — SIGNIFICANT CHANGE UP (ref 96–108)
CHLORIDE SERPL-SCNC: 104 MMOL/L — SIGNIFICANT CHANGE UP (ref 96–108)
CO2 SERPL-SCNC: 16 MMOL/L — LOW (ref 22–31)
CO2 SERPL-SCNC: 18 MMOL/L — LOW (ref 22–31)
CO2 SERPL-SCNC: 20 MMOL/L — LOW (ref 22–31)
CREAT SERPL-MCNC: 0.63 MG/DL — SIGNIFICANT CHANGE UP (ref 0.5–1.3)
CREAT SERPL-MCNC: 0.69 MG/DL — SIGNIFICANT CHANGE UP (ref 0.5–1.3)
CREAT SERPL-MCNC: 0.69 MG/DL — SIGNIFICANT CHANGE UP (ref 0.5–1.3)
EOSINOPHIL # BLD AUTO: 0 K/UL — SIGNIFICANT CHANGE UP (ref 0–0.5)
EOSINOPHIL # BLD AUTO: 0.02 K/UL — SIGNIFICANT CHANGE UP (ref 0–0.5)
EOSINOPHIL # BLD AUTO: 0.06 K/UL — SIGNIFICANT CHANGE UP (ref 0–0.5)
EOSINOPHIL NFR BLD AUTO: 0 % — SIGNIFICANT CHANGE UP (ref 0–6)
EOSINOPHIL NFR BLD AUTO: 0.2 % — SIGNIFICANT CHANGE UP (ref 0–6)
EOSINOPHIL NFR BLD AUTO: 0.8 % — SIGNIFICANT CHANGE UP (ref 0–6)
FIBRINOGEN PPP-MCNC: 1072 MG/DL — HIGH (ref 290–520)
FIBRINOGEN PPP-MCNC: 846 MG/DL — HIGH (ref 290–520)
FIBRINOGEN PPP-MCNC: 958 MG/DL — HIGH (ref 290–520)
GLUCOSE BLDC GLUCOMTR-MCNC: 68 MG/DL — LOW (ref 70–99)
GLUCOSE BLDC GLUCOMTR-MCNC: 74 MG/DL — SIGNIFICANT CHANGE UP (ref 70–99)
GLUCOSE BLDC GLUCOMTR-MCNC: 92 MG/DL — SIGNIFICANT CHANGE UP (ref 70–99)
GLUCOSE SERPL-MCNC: 102 MG/DL — HIGH (ref 70–99)
GLUCOSE SERPL-MCNC: 76 MG/DL — SIGNIFICANT CHANGE UP (ref 70–99)
GLUCOSE SERPL-MCNC: 82 MG/DL — SIGNIFICANT CHANGE UP (ref 70–99)
HCT VFR BLD CALC: 37.3 % — SIGNIFICANT CHANGE UP (ref 34.5–45)
HCT VFR BLD CALC: 40.1 % — SIGNIFICANT CHANGE UP (ref 34.5–45)
HCT VFR BLD CALC: 46.9 % — HIGH (ref 34.5–45)
HGB BLD-MCNC: 12 G/DL — SIGNIFICANT CHANGE UP (ref 11.5–15.5)
HGB BLD-MCNC: 13.4 G/DL — SIGNIFICANT CHANGE UP (ref 11.5–15.5)
HGB BLD-MCNC: 14.8 G/DL — SIGNIFICANT CHANGE UP (ref 11.5–15.5)
IMM GRANULOCYTES NFR BLD AUTO: 0.4 % — SIGNIFICANT CHANGE UP (ref 0–1.5)
IMM GRANULOCYTES NFR BLD AUTO: 0.6 % — SIGNIFICANT CHANGE UP (ref 0–1.5)
INR BLD: 0.81 RATIO — LOW (ref 0.88–1.16)
INR BLD: 0.83 RATIO — LOW (ref 0.88–1.16)
LDH SERPL L TO P-CCNC: 183 U/L — SIGNIFICANT CHANGE UP (ref 50–242)
LDH SERPL L TO P-CCNC: 208 U/L — SIGNIFICANT CHANGE UP (ref 50–242)
LDH SERPL L TO P-CCNC: 223 U/L — SIGNIFICANT CHANGE UP (ref 50–242)
LYMPHOCYTES # BLD AUTO: 0.61 K/UL — LOW (ref 1–3.3)
LYMPHOCYTES # BLD AUTO: 0.89 K/UL — LOW (ref 1–3.3)
LYMPHOCYTES # BLD AUTO: 1.34 K/UL — SIGNIFICANT CHANGE UP (ref 1–3.3)
LYMPHOCYTES # BLD AUTO: 17.1 % — SIGNIFICANT CHANGE UP (ref 13–44)
LYMPHOCYTES # BLD AUTO: 4.4 % — LOW (ref 13–44)
LYMPHOCYTES # BLD AUTO: 8.3 % — LOW (ref 13–44)
MAGNESIUM SERPL-MCNC: 5.7 MG/DL — HIGH (ref 1.6–2.6)
MAGNESIUM SERPL-MCNC: 5.8 MG/DL — HIGH (ref 1.6–2.6)
MAGNESIUM SERPL-MCNC: 6.4 MG/DL — HIGH (ref 1.6–2.6)
MCHC RBC-ENTMCNC: 29.4 PG — SIGNIFICANT CHANGE UP (ref 27–34)
MCHC RBC-ENTMCNC: 29.9 PG — SIGNIFICANT CHANGE UP (ref 27–34)
MCHC RBC-ENTMCNC: 30.8 PG — SIGNIFICANT CHANGE UP (ref 27–34)
MCHC RBC-ENTMCNC: 31.6 GM/DL — LOW (ref 32–36)
MCHC RBC-ENTMCNC: 32.2 GM/DL — SIGNIFICANT CHANGE UP (ref 32–36)
MCHC RBC-ENTMCNC: 33.4 GM/DL — SIGNIFICANT CHANGE UP (ref 32–36)
MCV RBC AUTO: 92.2 FL — SIGNIFICANT CHANGE UP (ref 80–100)
MCV RBC AUTO: 92.8 FL — SIGNIFICANT CHANGE UP (ref 80–100)
MCV RBC AUTO: 93.1 FL — SIGNIFICANT CHANGE UP (ref 80–100)
MONOCYTES # BLD AUTO: 0.36 K/UL — SIGNIFICANT CHANGE UP (ref 0–0.9)
MONOCYTES # BLD AUTO: 0.57 K/UL — SIGNIFICANT CHANGE UP (ref 0–0.9)
MONOCYTES # BLD AUTO: 0.69 K/UL — SIGNIFICANT CHANGE UP (ref 0–0.9)
MONOCYTES NFR BLD AUTO: 2.6 % — SIGNIFICANT CHANGE UP (ref 2–14)
MONOCYTES NFR BLD AUTO: 6.5 % — SIGNIFICANT CHANGE UP (ref 2–14)
MONOCYTES NFR BLD AUTO: 7.3 % — SIGNIFICANT CHANGE UP (ref 2–14)
NEUTROPHILS # BLD AUTO: 12.73 K/UL — HIGH (ref 1.8–7.4)
NEUTROPHILS # BLD AUTO: 5.8 K/UL — SIGNIFICANT CHANGE UP (ref 1.8–7.4)
NEUTROPHILS # BLD AUTO: 8.99 K/UL — HIGH (ref 1.8–7.4)
NEUTROPHILS NFR BLD AUTO: 74 % — SIGNIFICANT CHANGE UP (ref 43–77)
NEUTROPHILS NFR BLD AUTO: 84.1 % — HIGH (ref 43–77)
NEUTROPHILS NFR BLD AUTO: 91.3 % — HIGH (ref 43–77)
NRBC # BLD: 0 /100 WBCS — SIGNIFICANT CHANGE UP (ref 0–0)
NRBC # BLD: 0 /100 WBCS — SIGNIFICANT CHANGE UP (ref 0–0)
PLATELET # BLD AUTO: 234 K/UL — SIGNIFICANT CHANGE UP (ref 150–400)
PLATELET # BLD AUTO: 234 K/UL — SIGNIFICANT CHANGE UP (ref 150–400)
PLATELET # BLD AUTO: 273 K/UL — SIGNIFICANT CHANGE UP (ref 150–400)
POTASSIUM SERPL-MCNC: 4.2 MMOL/L — SIGNIFICANT CHANGE UP (ref 3.5–5.3)
POTASSIUM SERPL-MCNC: 4.5 MMOL/L — SIGNIFICANT CHANGE UP (ref 3.5–5.3)
POTASSIUM SERPL-MCNC: 4.6 MMOL/L — SIGNIFICANT CHANGE UP (ref 3.5–5.3)
POTASSIUM SERPL-SCNC: 4.2 MMOL/L — SIGNIFICANT CHANGE UP (ref 3.5–5.3)
POTASSIUM SERPL-SCNC: 4.5 MMOL/L — SIGNIFICANT CHANGE UP (ref 3.5–5.3)
POTASSIUM SERPL-SCNC: 4.6 MMOL/L — SIGNIFICANT CHANGE UP (ref 3.5–5.3)
PROT SERPL-MCNC: 5.8 G/DL — LOW (ref 6–8.3)
PROT SERPL-MCNC: 6.2 G/DL — SIGNIFICANT CHANGE UP (ref 6–8.3)
PROT SERPL-MCNC: 7.3 G/DL — SIGNIFICANT CHANGE UP (ref 6–8.3)
PROTHROM AB SERPL-ACNC: 10.1 SEC — LOW (ref 10.6–13.6)
PROTHROM AB SERPL-ACNC: 9.8 SEC — LOW (ref 10.6–13.6)
RBC # BLD: 4.02 M/UL — SIGNIFICANT CHANGE UP (ref 3.8–5.2)
RBC # BLD: 4.35 M/UL — SIGNIFICANT CHANGE UP (ref 3.8–5.2)
RBC # BLD: 5.04 M/UL — SIGNIFICANT CHANGE UP (ref 3.8–5.2)
RBC # FLD: 13.6 % — SIGNIFICANT CHANGE UP (ref 10.3–14.5)
RBC # FLD: 13.8 % — SIGNIFICANT CHANGE UP (ref 10.3–14.5)
RBC # FLD: 14 % — SIGNIFICANT CHANGE UP (ref 10.3–14.5)
SODIUM SERPL-SCNC: 134 MMOL/L — LOW (ref 135–145)
SODIUM SERPL-SCNC: 135 MMOL/L — SIGNIFICANT CHANGE UP (ref 135–145)
SODIUM SERPL-SCNC: 136 MMOL/L — SIGNIFICANT CHANGE UP (ref 135–145)
URATE SERPL-MCNC: 9.3 MG/DL — HIGH (ref 2.5–7)
URATE SERPL-MCNC: 9.3 MG/DL — HIGH (ref 2.5–7)
WBC # BLD: 10.68 K/UL — HIGH (ref 3.8–10.5)
WBC # BLD: 13.94 K/UL — HIGH (ref 3.8–10.5)
WBC # BLD: 7.83 K/UL — SIGNIFICANT CHANGE UP (ref 3.8–10.5)
WBC # FLD AUTO: 10.68 K/UL — HIGH (ref 3.8–10.5)
WBC # FLD AUTO: 13.94 K/UL — HIGH (ref 3.8–10.5)
WBC # FLD AUTO: 7.83 K/UL — SIGNIFICANT CHANGE UP (ref 3.8–10.5)

## 2020-12-10 PROCEDURE — 59514 CESAREAN DELIVERY ONLY: CPT | Mod: AS,U7

## 2020-12-10 PROCEDURE — 88307 TISSUE EXAM BY PATHOLOGIST: CPT | Mod: 26

## 2020-12-10 PROCEDURE — 59515 CESAREAN DELIVERY: CPT

## 2020-12-10 PROCEDURE — 71045 X-RAY EXAM CHEST 1 VIEW: CPT | Mod: 26

## 2020-12-10 RX ORDER — BENZOCAINE AND MENTHOL 5; 1 G/100ML; G/100ML
1 LIQUID ORAL THREE TIMES A DAY
Refills: 0 | Status: DISCONTINUED | OUTPATIENT
Start: 2020-12-10 | End: 2020-12-15

## 2020-12-10 RX ORDER — TETANUS TOXOID, REDUCED DIPHTHERIA TOXOID AND ACELLULAR PERTUSSIS VACCINE, ADSORBED 5; 2.5; 8; 8; 2.5 [IU]/.5ML; [IU]/.5ML; UG/.5ML; UG/.5ML; UG/.5ML
0.5 SUSPENSION INTRAMUSCULAR ONCE
Refills: 0 | Status: DISCONTINUED | OUTPATIENT
Start: 2020-12-10 | End: 2020-12-15

## 2020-12-10 RX ORDER — MORPHINE SULFATE 50 MG/1
2 CAPSULE, EXTENDED RELEASE ORAL ONCE
Refills: 0 | Status: DISCONTINUED | OUTPATIENT
Start: 2020-12-10 | End: 2020-12-11

## 2020-12-10 RX ORDER — OXYCODONE HYDROCHLORIDE 5 MG/1
5 TABLET ORAL
Refills: 0 | Status: DISCONTINUED | OUTPATIENT
Start: 2020-12-10 | End: 2020-12-11

## 2020-12-10 RX ORDER — OXYTOCIN 10 UNIT/ML
333.33 VIAL (ML) INJECTION
Qty: 20 | Refills: 0 | Status: DISCONTINUED | OUTPATIENT
Start: 2020-12-10 | End: 2020-12-15

## 2020-12-10 RX ORDER — ONDANSETRON 8 MG/1
4 TABLET, FILM COATED ORAL EVERY 6 HOURS
Refills: 0 | Status: DISCONTINUED | OUTPATIENT
Start: 2020-12-10 | End: 2020-12-11

## 2020-12-10 RX ORDER — OXYCODONE HYDROCHLORIDE 5 MG/1
5 TABLET ORAL
Refills: 0 | Status: COMPLETED | OUTPATIENT
Start: 2020-12-10 | End: 2020-12-17

## 2020-12-10 RX ORDER — OXYCODONE HYDROCHLORIDE 5 MG/1
5 TABLET ORAL ONCE
Refills: 0 | Status: DISCONTINUED | OUTPATIENT
Start: 2020-12-10 | End: 2020-12-15

## 2020-12-10 RX ORDER — HEPARIN SODIUM 5000 [USP'U]/ML
5000 INJECTION INTRAVENOUS; SUBCUTANEOUS EVERY 12 HOURS
Refills: 0 | Status: DISCONTINUED | OUTPATIENT
Start: 2020-12-11 | End: 2020-12-15

## 2020-12-10 RX ORDER — MAGNESIUM HYDROXIDE 400 MG/1
30 TABLET, CHEWABLE ORAL
Refills: 0 | Status: DISCONTINUED | OUTPATIENT
Start: 2020-12-10 | End: 2020-12-15

## 2020-12-10 RX ORDER — ACETAMINOPHEN 500 MG
1000 TABLET ORAL EVERY 6 HOURS
Refills: 0 | Status: COMPLETED | OUTPATIENT
Start: 2020-12-10 | End: 2020-12-11

## 2020-12-10 RX ORDER — NALOXONE HYDROCHLORIDE 4 MG/.1ML
0.1 SPRAY NASAL
Refills: 0 | Status: DISCONTINUED | OUTPATIENT
Start: 2020-12-10 | End: 2020-12-11

## 2020-12-10 RX ORDER — SODIUM CHLORIDE 9 MG/ML
1000 INJECTION, SOLUTION INTRAVENOUS
Refills: 0 | Status: DISCONTINUED | OUTPATIENT
Start: 2020-12-10 | End: 2020-12-11

## 2020-12-10 RX ORDER — DIPHENHYDRAMINE HCL 50 MG
25 CAPSULE ORAL EVERY 6 HOURS
Refills: 0 | Status: DISCONTINUED | OUTPATIENT
Start: 2020-12-10 | End: 2020-12-15

## 2020-12-10 RX ORDER — FERROUS SULFATE 325(65) MG
325 TABLET ORAL DAILY
Refills: 0 | Status: DISCONTINUED | OUTPATIENT
Start: 2020-12-10 | End: 2020-12-15

## 2020-12-10 RX ORDER — ACETAMINOPHEN 500 MG
975 TABLET ORAL EVERY 6 HOURS
Refills: 0 | Status: COMPLETED | OUTPATIENT
Start: 2020-12-10 | End: 2021-11-08

## 2020-12-10 RX ORDER — OXYTOCIN 10 UNIT/ML
2 VIAL (ML) INJECTION
Qty: 30 | Refills: 0 | Status: DISCONTINUED | OUTPATIENT
Start: 2020-12-10 | End: 2020-12-15

## 2020-12-10 RX ORDER — SIMETHICONE 80 MG/1
80 TABLET, CHEWABLE ORAL EVERY 4 HOURS
Refills: 0 | Status: DISCONTINUED | OUTPATIENT
Start: 2020-12-10 | End: 2020-12-15

## 2020-12-10 RX ORDER — LANOLIN
1 OINTMENT (GRAM) TOPICAL EVERY 6 HOURS
Refills: 0 | Status: DISCONTINUED | OUTPATIENT
Start: 2020-12-10 | End: 2020-12-15

## 2020-12-10 RX ORDER — DEXAMETHASONE 0.5 MG/5ML
4 ELIXIR ORAL EVERY 6 HOURS
Refills: 0 | Status: DISCONTINUED | OUTPATIENT
Start: 2020-12-10 | End: 2020-12-11

## 2020-12-10 RX ADMIN — Medication 400 MILLIGRAM(S): at 15:29

## 2020-12-10 RX ADMIN — Medication 975 MILLIGRAM(S): at 01:58

## 2020-12-10 RX ADMIN — Medication 975 MILLIGRAM(S): at 01:57

## 2020-12-10 RX ADMIN — Medication 108 GRAM(S): at 07:47

## 2020-12-10 RX ADMIN — Medication 200 MILLIGRAM(S): at 12:05

## 2020-12-10 RX ADMIN — Medication 200 MILLIGRAM(S): at 03:48

## 2020-12-10 RX ADMIN — Medication 108 GRAM(S): at 03:45

## 2020-12-10 NOTE — OB PROVIDER LABOR PROGRESS NOTE - NS_OBIHICONTRACTIONPATTERNDETAILS_OBGYN_ALL_OB_FT
3ctx/10min
irreg
q5-6m
3ctx/10min
Irregular contractions
Irregular contractions
ctx q 4-5 minutes
no ctx

## 2020-12-10 NOTE — OB PROVIDER LABOR PROGRESS NOTE - NS_SUBJECTIVE/OBJECTIVE_OBGYN_ALL_OB_FT
Called to bedside 2/2 concern for pulmonary edema. SERGIO Bravo PA-C had placed vaginal cytotec and on lung exam noted crackles at R lung base. Patient feels well, complains of mild tickle in throat. She denies SOB, chest pain, abdominal pain, n/v.      T(C): 36.8 (12-10-20 @ 01:45), Max: 37.1 (12-09-20 @ 06:52)  HR: 78 (12-10-20 @ 04:07) (61 - 95)  BP: 145/87 (12-10-20 @ 04:06) (104/58 - 147/87)  SpO2: 97% (12-10-20 @ 04:07) (90% - 100%)    well appearing, NAD  heart: RR, S1 and S2 nl  lungs: some referred expiratory breath sounds; crackles notable at R lung base  Bedside lung sono: 2-3 B line in R lung base, scant A lines, no pleural thickening, pleural sliding appeared normal    CAPILLARY BLOOD GLUCOSE  POCT Blood Glucose.: 74 mg/dL (10 Dec 2020 04:10)  POCT Blood Glucose.: 82 mg/dL (09 Dec 2020 23:52)  POCT Blood Glucose.: 85 mg/dL (09 Dec 2020 19:52)  POCT Blood Glucose.: 87 mg/dL (09 Dec 2020 16:03)  POCT Blood Glucose.: 73 mg/dL (09 Dec 2020 12:36)  POCT Blood Glucose.: 86 mg/dL (09 Dec 2020 08:29)  POCT Blood Glucose.: 89 mg/dL (09 Dec 2020 04:31)   N/A      12-08 @ 07:01  -  12-09 @ 07:00  --------------------------------------------------------  IN:    dextrose 5% + sodium chloride 0.9%: 439.4 mL    IV PiggyBack: 200 mL    Lactated Ringers: 284.4 mL    Magnesium Sulfate: 607.8 mL  Total IN: 1531.6 mL    OUT:    Voided (mL): 1600 mL  Total OUT: 1600 mL    Total NET: -68.4 mL      12-09 @ 07:01  -  12-10 @ 04:12  --------------------------------------------------------  IN:    dextrose 5% + sodium chloride 0.9%: 600 mL    IV PiggyBack: 150 mL    IV PiggyBack: 100 mL    Magnesium Sulfate: 592.2 mL    sodium chloride 0.9%: 1000 mL  Total IN: 2442.2 mL    OUT:    Indwelling Catheter - Urethral (mL): 1050 mL    Voided (mL): 600 mL  Total OUT: 1650 mL    Total NET: 792.2 mL

## 2020-12-10 NOTE — OB RN DELIVERY SUMMARY - NS_RNDELIVATTEST_OBGYN_ALL_OB
MEDICATIONS  (STANDING):  amiodarone    Tablet 100 milliGRAM(s) Oral daily  cinacalcet 30 milliGRAM(s) Oral <User Schedule>  docusate sodium 100 milliGRAM(s) Oral three times a day  epoetin amee Injectable 97138 Unit(s) IV Push <User Schedule>  lactobacillus acidophilus 1 Tablet(s) Oral daily  losartan 25 milliGRAM(s) Oral daily  pantoprazole  Injectable 40 milliGRAM(s) IV Push two times a day  predniSONE   Tablet 5 milliGRAM(s) Oral daily  sevelamer hydrochloride 2400 milliGRAM(s) Oral three times a day with meals  warfarin 1.5 milliGRAM(s) Oral daily    MEDICATIONS  (PRN):  acetaminophen   Tablet .. 650 milliGRAM(s) Oral every 6 hours PRN Temp greater or equal to 38.5C (101.3F), Mild Pain (1 - 3)  aluminum hydroxide/magnesium hydroxide/simethicone Suspension 30 milliLiter(s) Oral every 6 hours PRN Dyspepsia  oxyCODONE    IR 5 milliGRAM(s) Oral every 6 hours PRN Severe Pain (7 - 10)
Laps, needles and instrument count was correct

## 2020-12-10 NOTE — OB PROVIDER LABOR PROGRESS NOTE - ASSESSMENT
A/P:   discussed starting low-dose pitocin with patient. She says she will think about it and considering having an elective  delivery due to prolonged induction time  continue Mg gtt  BP monitoring    will d/w Dr Prasad Amaro, PAC

## 2020-12-10 NOTE — OB PROVIDER LABOR PROGRESS NOTE - NS_OBIHIFHRDETAILS_OBGYN_ALL_OB_FT
115/moderate variability/no accels/no decels
120 mod cristina, +accels, no decels
cat 1
120, min-mod cristina, + accels, - decels
130, mod cristina, +accel, no decel (disconintuous 2/2 body habitus, readjusted monitor)
135, mod cristina, +accels, no decels
EFM: Baseline 120, moderate variability, no accels, no decels, Cat I
EFM: Baseline 130, moderate variability, +accels, no decels, Cat I

## 2020-12-10 NOTE — BRIEF OPERATIVE NOTE - OPERATION/FINDINGS
primary low transverse  section performed. delivery of liveborn female infant in vertex position. APGARS 8/9. Manual delivery of intact placenta. uterus exteriorized and cleared of clot, membrane and debris. hysterotomy closed w/ caprosyn with imbricating layer using same suture material. perforation to R mesosalpinx occurred, with hemostasis obtained with manual pressure. figure of 8 suture using 2.0 vicryl performed to stabilize perforation. Good hemostasis assured. Uterus return to abdomen, fibular and swathi placed at defected area of R mesosalpinx; good hemostasis assured. Muscle reapproximated with plain suture. Fascia reapproximated with 0.0 vicryl. Subcutaneous layer reapproximated in layers. Skin closed in subcuticular fashion. EBL 1000  IVF 1700 Pt tolerated procedure well and transferred to PACU

## 2020-12-10 NOTE — OB PROVIDER LABOR PROGRESS NOTE - ASSESSMENT
Cervical balloon displaced as uterine balloon was noted to be deflated with a defect in the balloon. As the patient was 3cm, decision was made not to place new balloon. Vaginal cytotec #6 placed.    Bobby Moore MD PGY4

## 2020-12-10 NOTE — OB PROVIDER LABOR PROGRESS NOTE - NS_SUBJECTIVE/OBJECTIVE_OBGYN_ALL_OB_FT
OB PA Note    Pt evaluated at bedside. No current complaints. Denies SOB/CP    Vital Signs Last 24 Hrs  T(C): 36.6 (10 Dec 2020 07:50), Max: 37.1 (09 Dec 2020 18:47)  T(F): 97.88 (10 Dec 2020 07:50), Max: 98.78 (09 Dec 2020 18:47)  HR: 91 (10 Dec 2020 10:07) (61 - 91)  BP: 137/84 (10 Dec 2020 10:07) (104/58 - 154/87)  BP(mean): --  RR: 18 (10 Dec 2020 07:50) (18 - 18)  SpO2: 99% (10 Dec 2020 10:07) (90% - 100%)    VE 3/long/high/tone firm    FHT discontinuous previously 120 min cristina no accels no decels  toco: irregular

## 2020-12-10 NOTE — OB PROVIDER LABOR PROGRESS NOTE - NS_SUBJECTIVE/OBJECTIVE_OBGYN_ALL_OB_FT
R4 Labor Note    Pt seen and examined at bedside for vaginal cytotec placement.    T(C): 36.5 (12-10-20 @ 05:45), Max: 37.1 (12-09-20 @ 09:19)  HR: 87 (12-10-20 @ 07:04) (61 - 94)  BP: 140/80 (12-10-20 @ 06:50) (104/58 - 154/87)  RR: --  SpO2: 91% (12-10-20 @ 07:04) (90% - 100%)    SVE:  EFM:  [place]:    -     Discussed with Dr. Bobby Moore MD PGY4

## 2020-12-10 NOTE — OB PROVIDER LABOR PROGRESS NOTE - ASSESSMENT
pt counseled on the risks of CD vs continued induction. discussed risks of PPH. currently on magnesium. will proceed with  delivery. all questions answered.    EULALIA Parham

## 2020-12-10 NOTE — OB PROVIDER LABOR PROGRESS NOTE - ASSESSMENT
36yo  with siPEC and GDMA2 on Magnesium with new onset mild pulmonary edema. Patient does have risk factors including siPEC, obesity, prolonged induction. Patient is not symptomatic and VS wnl.   -low fluid to 75ml/hr total  -UOP wnl, monitor closely  -monitor O2 sats  -c/w antihypertensives  -cepacol for throat  -repeat HELLP labs  -CXR if sx worsen  -consider Keppra and lasix if pulmonary edema worsens    D/w Dr. Andres De Paz PGY-3 36yo  with siPEC and GDMA2 on Magnesium with new onset mild pulmonary edema. Patient does have risk factors including siPEC, obesity, prolonged induction. Patient is not symptomatic and VS wnl.   -low fluid to 75ml/hr total  -UOP wnl, monitor closely  -monitor O2 sats  -c/w antihypertensives  -cepacol for throat  -repeat HELLP labs  -CXR if sx worsen  -consider Keppra and lasix if pulmonary edema worsens  -c/w SCDs    D/w Dr. Andres De Paz PGY-3

## 2020-12-10 NOTE — CHART NOTE - NSCHARTNOTEFT_GEN_A_CORE
Pt evaluated at bedside due to O2 sat of 93% on RA. Pt denies SOB, dizziness, CP. pain currently well controlled. Short-term improvement in O2 sat with deep inspirations and sitting upright to 98%    ICU Vital Signs Last 24 Hrs  T(C): 36.6 (10 Dec 2020 14:05), Max: 37.1 (09 Dec 2020 18:47)  T(F): 97.9 (10 Dec 2020 14:05), Max: 98.78 (09 Dec 2020 18:47)  HR: 76 (10 Dec 2020 16:05) (61 - 92)  BP: 144/87 (10 Dec 2020 16:05) (110/65 - 161/88)  BP(mean): 110 (10 Dec 2020 15:50) (98 - 110)  ABP: --  ABP(mean): --  RR: 22 (10 Dec 2020 16:05) (18 - 27)  SpO2: 93% (10 Dec 2020 16:05) (86% - 100%)      Gen: NAD  Heart: S1S2 RRR  Lungs: dec BS noted R lower lobe. L lung CTA  Abd: soft mildly distended    Plan:   incentive spirometer  CXR ordered    Dr Parham at bedside    Flores Amaro, PAC

## 2020-12-10 NOTE — PROVIDER CONTACT NOTE (CHANGE IN STATUS NOTIFICATION) - SITUATION
Pt. c/o wheezing and cough, and swelling, fluids were lowered as per MD, consulted with safety MD's to discuss plan of care of pt.

## 2020-12-10 NOTE — OB NEONATOLOGY/PEDIATRICIAN DELIVERY SUMMARY - NSPEDSNEONOTESA_OBGYN_ALL_OB_FT
Baby girl born at 37wks via C/S to a 36 y/o  mother who is O+ blood type, HBsAg neg, HIV neg, rubella immune, RPR neg, GBS pos and treated with amp X10. Maternal history of chronic HTN on mag sulfate. No significant prenatal history.  SROM at 0328 with clear fluids. Baby emerged with good tone and cord clamping was delayed for 45 secs. Infant placed radiant warmer dried and stimulated. APGARS 8/ 9. Mom would like to breast feed and declines the birth dose of Hep B.

## 2020-12-10 NOTE — OB PROVIDER LABOR PROGRESS NOTE - NS_SUBJECTIVE/OBJECTIVE_OBGYN_ALL_OB_FT
s/p cervical balloon and found to be ruptured. cervix with tone tone but discussion regarding pitocin. pt decided to proceed with  delivery for arrest of dilation.

## 2020-12-10 NOTE — OB PROVIDER LABOR PROGRESS NOTE - NS_SUBJECTIVE/OBJECTIVE_OBGYN_ALL_OB_FT
Vaginal cytotec placed. CB in place. Pt was ruptured on exam.     EFM: 120/moderate variability/-accels/-decels  TOCO: irregular    A/P: siPEC/Mg IOL  - BPs mild range   - VC placed  - CB in place  - epidural in place     Asuncion Bravo PAC

## 2020-12-11 LAB
ALBUMIN SERPL ELPH-MCNC: 3.1 G/DL — LOW (ref 3.3–5)
ALP SERPL-CCNC: 131 U/L — HIGH (ref 40–120)
ALT FLD-CCNC: 9 U/L — LOW (ref 10–45)
ANION GAP SERPL CALC-SCNC: 11 MMOL/L — SIGNIFICANT CHANGE UP (ref 5–17)
APTT BLD: 26.5 SEC — LOW (ref 27.5–35.5)
AST SERPL-CCNC: 16 U/L — SIGNIFICANT CHANGE UP (ref 10–40)
BASOPHILS # BLD AUTO: 0.02 K/UL — SIGNIFICANT CHANGE UP (ref 0–0.2)
BASOPHILS NFR BLD AUTO: 0.1 % — SIGNIFICANT CHANGE UP (ref 0–2)
BILIRUB SERPL-MCNC: 0.1 MG/DL — LOW (ref 0.2–1.2)
BUN SERPL-MCNC: 10 MG/DL — SIGNIFICANT CHANGE UP (ref 7–23)
CALCIUM SERPL-MCNC: 8.1 MG/DL — LOW (ref 8.4–10.5)
CHLORIDE SERPL-SCNC: 102 MMOL/L — SIGNIFICANT CHANGE UP (ref 96–108)
CO2 SERPL-SCNC: 19 MMOL/L — LOW (ref 22–31)
CREAT SERPL-MCNC: 0.73 MG/DL — SIGNIFICANT CHANGE UP (ref 0.5–1.3)
EOSINOPHIL # BLD AUTO: 0 K/UL — SIGNIFICANT CHANGE UP (ref 0–0.5)
EOSINOPHIL NFR BLD AUTO: 0 % — SIGNIFICANT CHANGE UP (ref 0–6)
FIBRINOGEN PPP-MCNC: 1020 MG/DL — HIGH (ref 290–520)
GLUCOSE SERPL-MCNC: 104 MG/DL — HIGH (ref 70–99)
HCT VFR BLD CALC: 30.8 % — LOW (ref 34.5–45)
HGB BLD-MCNC: 10 G/DL — LOW (ref 11.5–15.5)
IMM GRANULOCYTES NFR BLD AUTO: 0.6 % — SIGNIFICANT CHANGE UP (ref 0–1.5)
INR BLD: 0.88 RATIO — SIGNIFICANT CHANGE UP (ref 0.88–1.16)
LDH SERPL L TO P-CCNC: 232 U/L — SIGNIFICANT CHANGE UP (ref 50–242)
LYMPHOCYTES # BLD AUTO: 1.31 K/UL — SIGNIFICANT CHANGE UP (ref 1–3.3)
LYMPHOCYTES # BLD AUTO: 9.2 % — LOW (ref 13–44)
MAGNESIUM SERPL-MCNC: 5.6 MG/DL — HIGH (ref 1.6–2.6)
MCHC RBC-ENTMCNC: 29.9 PG — SIGNIFICANT CHANGE UP (ref 27–34)
MCHC RBC-ENTMCNC: 32.5 GM/DL — SIGNIFICANT CHANGE UP (ref 32–36)
MCV RBC AUTO: 91.9 FL — SIGNIFICANT CHANGE UP (ref 80–100)
MONOCYTES # BLD AUTO: 1.27 K/UL — HIGH (ref 0–0.9)
MONOCYTES NFR BLD AUTO: 8.9 % — SIGNIFICANT CHANGE UP (ref 2–14)
NEUTROPHILS # BLD AUTO: 11.54 K/UL — HIGH (ref 1.8–7.4)
NEUTROPHILS NFR BLD AUTO: 81.2 % — HIGH (ref 43–77)
NRBC # BLD: 0 /100 WBCS — SIGNIFICANT CHANGE UP (ref 0–0)
PLATELET # BLD AUTO: 204 K/UL — SIGNIFICANT CHANGE UP (ref 150–400)
POTASSIUM SERPL-MCNC: 4.6 MMOL/L — SIGNIFICANT CHANGE UP (ref 3.5–5.3)
POTASSIUM SERPL-SCNC: 4.6 MMOL/L — SIGNIFICANT CHANGE UP (ref 3.5–5.3)
PROT SERPL-MCNC: 5.5 G/DL — LOW (ref 6–8.3)
PROTHROM AB SERPL-ACNC: 10.6 SEC — SIGNIFICANT CHANGE UP (ref 10.6–13.6)
RBC # BLD: 3.35 M/UL — LOW (ref 3.8–5.2)
RBC # FLD: 13.8 % — SIGNIFICANT CHANGE UP (ref 10.3–14.5)
SODIUM SERPL-SCNC: 132 MMOL/L — LOW (ref 135–145)
URATE SERPL-MCNC: 9.6 MG/DL — HIGH (ref 2.5–7)
WBC # BLD: 14.22 K/UL — HIGH (ref 3.8–10.5)
WBC # FLD AUTO: 14.22 K/UL — HIGH (ref 3.8–10.5)

## 2020-12-11 RX ORDER — ACETAMINOPHEN 500 MG
975 TABLET ORAL EVERY 6 HOURS
Refills: 0 | Status: DISCONTINUED | OUTPATIENT
Start: 2020-12-11 | End: 2020-12-15

## 2020-12-11 RX ORDER — OXYCODONE HYDROCHLORIDE 5 MG/1
5 TABLET ORAL
Refills: 0 | Status: DISCONTINUED | OUTPATIENT
Start: 2020-12-11 | End: 2020-12-15

## 2020-12-11 RX ADMIN — Medication 975 MILLIGRAM(S): at 20:36

## 2020-12-11 RX ADMIN — Medication 1 TABLET(S): at 11:48

## 2020-12-11 RX ADMIN — Medication 400 MILLIGRAM(S): at 11:45

## 2020-12-11 RX ADMIN — Medication 200 MILLIGRAM(S): at 11:45

## 2020-12-11 RX ADMIN — OXYCODONE HYDROCHLORIDE 5 MILLIGRAM(S): 5 TABLET ORAL at 23:45

## 2020-12-11 RX ADMIN — Medication 975 MILLIGRAM(S): at 21:00

## 2020-12-11 RX ADMIN — Medication 400 MILLIGRAM(S): at 06:07

## 2020-12-11 RX ADMIN — Medication 1000 MILLIGRAM(S): at 01:04

## 2020-12-11 RX ADMIN — HEPARIN SODIUM 5000 UNIT(S): 5000 INJECTION INTRAVENOUS; SUBCUTANEOUS at 17:32

## 2020-12-11 RX ADMIN — Medication 200 MILLIGRAM(S): at 02:58

## 2020-12-11 RX ADMIN — Medication 325 MILLIGRAM(S): at 11:48

## 2020-12-11 RX ADMIN — HEPARIN SODIUM 5000 UNIT(S): 5000 INJECTION INTRAVENOUS; SUBCUTANEOUS at 06:07

## 2020-12-11 RX ADMIN — Medication 200 MILLIGRAM(S): at 17:29

## 2020-12-11 RX ADMIN — OXYCODONE HYDROCHLORIDE 5 MILLIGRAM(S): 5 TABLET ORAL at 23:13

## 2020-12-11 NOTE — PROGRESS NOTE ADULT - PROBLEM SELECTOR PLAN 1
-f/u AM HELLP labs  -f/u final CXR read, prelim with mild pulmonary edema  -O2 sat wnl, will continue to encourage IS and OOB  -HSQ  -Tylenol, Motrin, Oxy for pain  -c/w Labetalol 200 TID  -d/c Mg at 1p  -routine PP orders    P. Uppalapati PGY-3

## 2020-12-11 NOTE — ANESTHESIA FOLLOW-UP NOTE - NSINTERVIEW_GEN_ALL_CORE
Interviewed and evaluated
,[email or web address]

## 2020-12-11 NOTE — PROGRESS NOTE ADULT - ASSESSMENT
34yo  POD#1 s/p pLTCS c/b siPEC and pulmonary edema. Patient is feeling well this AM. Denies SOB and has been using IS. No s/s of sPEC at this time. EBL 1000.

## 2020-12-11 NOTE — PROGRESS NOTE ADULT - SUBJECTIVE AND OBJECTIVE BOX
R3 Antepartum Note    Patient seen and examined at bedside, no acute overnight events. No acute complaints, pain well controlled. Patient is ambulating and tolerating regular diet. Denies CP, SOB, N/V, fevers, and chills.    Vital Signs Last 24 Hours  T(C): 36.4 (12-11-20 @ 02:52), Max: 36.8 (12-10-20 @ 19:05)  HR: 80 (12-11-20 @ 02:52) (72 - 92)  BP: 129/76 (12-11-20 @ 02:52) (115/71 - 161/88)  RR: 18 (12-11-20 @ 02:52) (18 - 27)  SpO2: 96% (12-11-20 @ 02:52) (86% - 100%)    I&O's Detail    09 Dec 2020 07:01  -  10 Dec 2020 07:00  --------------------------------------------------------  IN:    dextrose 5% + sodium chloride 0.9%: 1191.3 mL    IV PiggyBack: 150 mL    IV PiggyBack: 100 mL    Magnesium Sulfate: 1164.1 mL    sodium chloride 0.9%: 1000 mL  Total IN: 3605.4 mL    OUT:    Indwelling Catheter - Urethral (mL): 2575 mL    Voided (mL): 600 mL  Total OUT: 3175 mL    Total NET: 430.4 mL      10 Dec 2020 07:01  -  11 Dec 2020 04:42  --------------------------------------------------------  IN:    dextrose 5% + sodium chloride 0.9%: 121.9 mL    IV PiggyBack: 50 mL    IV PiggyBack: 100 mL    Lactated Ringers: 100 mL    Magnesium Sulfate: 486.4 mL    Oral Fluid: 936 mL    Oxytocin: 269.3 mL  Total IN: 2063.6 mL    OUT:    Estimated Blood Loss (mL): 1000 mL    Indwelling Catheter - Urethral (mL): 2395 mL  Total OUT: 3395 mL    Total NET: -1331.4 mL    Physical Exam:  General: NAD  CV: NR, RR, S1, S2, no M/R/G  Lungs: crackles in RLL  Abdomen: Soft, non-tender, non-distended, +BS  Incision: dressing removed c/d/i with steris in place  Ext: No pain or swelling    Labs:             12.0   13.94<H> )-----------( 234      ( 12-10 @ 17:40 )             37.3                14.8   10.68<H> )-----------( 273      ( 12-10 @ 10:33 )             46.9<H>               13.4   7.83  )-----------( 234      ( 12-10 @ 04:26 )             40.1                11.8   7.60  )-----------( 222      ( 12-09 @ 22:06 )             36.7                11.5   8.27  )-----------( 218      ( 12-09 @ 16:23 )             35.3         MEDICATIONS  (STANDING):  acetaminophen   Tablet .. 975 milliGRAM(s) Oral every 6 hours  acetaminophen  IVPB .. 1000 milliGRAM(s) IV Intermittent every 6 hours  benzocaine 15 mG/menthol 3.6 mG (Sugar-Free) Lozenge 1 Lozenge Oral three times a day  calcium carbonate    500 mG (Tums) Chewable 1 Tablet(s) Chew three times a day  dextrose 5% + lactated ringers. 1000 milliLiter(s) (50 mL/Hr) IV Continuous <Continuous>  dextrose 5% + sodium chloride 0.9%. 1000 milliLiter(s) (50 mL/Hr) IV Continuous <Continuous>  diphtheria/tetanus/pertussis (acellular) Vaccine (ADAcel) 0.5 milliLiter(s) IntraMuscular once  ferrous    sulfate 325 milliGRAM(s) Oral daily  heparin   Injectable 5000 Unit(s) SubCutaneous every 12 hours  labetalol 200 milliGRAM(s) Oral every 8 hours  lactated ringers. 1000 milliLiter(s) (50 mL/Hr) IV Continuous <Continuous>  lactated ringers. 1000 milliLiter(s) (125 mL/Hr) IV Continuous <Continuous>  magnesium sulfate Infusion 2 Gm/Hr (50 mL/Hr) IV Continuous <Continuous>  morphine PF Epidural 2 milliGRAM(s) Epidural once  oxytocin Infusion 333.333 milliUNIT(s)/Min (1000 mL/Hr) IV Continuous <Continuous>  oxytocin Infusion 2 milliUNIT(s)/Min (2 mL/Hr) IV Continuous <Continuous>  oxytocin Infusion 333.333 milliUNIT(s)/Min (1000 mL/Hr) IV Continuous <Continuous>  prenatal multivitamin 1 Tablet(s) Oral daily  sodium chloride 0.9%. 1000 milliLiter(s) (50 mL/Hr) IV Continuous <Continuous>    MEDICATIONS  (PRN):  dexAMETHasone  Injectable 4 milliGRAM(s) IV Push every 6 hours PRN Nausea  diphenhydrAMINE 25 milliGRAM(s) Oral every 6 hours PRN Pruritus  lanolin Ointment 1 Application(s) Topical every 6 hours PRN Sore Nipples  magnesium hydroxide Suspension 30 milliLiter(s) Oral two times a day PRN Constipation  naloxone Injectable 0.1 milliGRAM(s) IV Push every 3 minutes PRN For ANY of the following changes in patient status:  A. Breaths Per Minute LESS THAN 10, B. Oxygen saturation LESS THAN 90%, C. Sedation score of 6 for Stop After: 4 Times  ondansetron Injectable 4 milliGRAM(s) IV Push every 6 hours PRN Nausea  oxyCODONE    IR 5 milliGRAM(s) Oral every 3 hours PRN Mild Pain (1 - 3)  oxyCODONE    IR 5 milliGRAM(s) Oral every 3 hours PRN Moderate to Severe Pain (4-10)  oxyCODONE    IR 5 milliGRAM(s) Oral once PRN Moderate to Severe Pain (4-10)  simethicone 80 milliGRAM(s) Chew every 4 hours PRN Gas

## 2020-12-11 NOTE — PROGRESS NOTE ADULT - SUBJECTIVE AND OBJECTIVE BOX
Day 1 of Anesthesia Pain Management Service    SUBJECTIVE: Doing ok - Some shoulder pain  Pain Scale Score:          [X] Refer to charted pain scores    THERAPY:  s/p   2  mg PF epidural morphine on 12\10\2020      MEDICATIONS  (STANDING):  acetaminophen   Tablet .. 975 milliGRAM(s) Oral every 6 hours  acetaminophen  IVPB .. 1000 milliGRAM(s) IV Intermittent every 6 hours  benzocaine 15 mG/menthol 3.6 mG (Sugar-Free) Lozenge 1 Lozenge Oral three times a day  calcium carbonate    500 mG (Tums) Chewable 1 Tablet(s) Chew three times a day  diphtheria/tetanus/pertussis (acellular) Vaccine (ADAcel) 0.5 milliLiter(s) IntraMuscular once  ferrous    sulfate 325 milliGRAM(s) Oral daily  heparin   Injectable 5000 Unit(s) SubCutaneous every 12 hours  labetalol 200 milliGRAM(s) Oral every 8 hours  lactated ringers. 1000 milliLiter(s) (50 mL/Hr) IV Continuous <Continuous>  magnesium sulfate Infusion 2 Gm/Hr (50 mL/Hr) IV Continuous <Continuous>  morphine PF Epidural 2 milliGRAM(s) Epidural once  oxytocin Infusion 333.333 milliUNIT(s)/Min (1000 mL/Hr) IV Continuous <Continuous>  oxytocin Infusion 2 milliUNIT(s)/Min (2 mL/Hr) IV Continuous <Continuous>  prenatal multivitamin 1 Tablet(s) Oral daily    MEDICATIONS  (PRN):  dexAMETHasone  Injectable 4 milliGRAM(s) IV Push every 6 hours PRN Nausea  diphenhydrAMINE 25 milliGRAM(s) Oral every 6 hours PRN Pruritus  lanolin Ointment 1 Application(s) Topical every 6 hours PRN Sore Nipples  magnesium hydroxide Suspension 30 milliLiter(s) Oral two times a day PRN Constipation  naloxone Injectable 0.1 milliGRAM(s) IV Push every 3 minutes PRN For ANY of the following changes in patient status:  A. Breaths Per Minute LESS THAN 10, B. Oxygen saturation LESS THAN 90%, C. Sedation score of 6 for Stop After: 4 Times  ondansetron Injectable 4 milliGRAM(s) IV Push every 6 hours PRN Nausea  oxyCODONE    IR 5 milliGRAM(s) Oral every 3 hours PRN Mild Pain (1 - 3)  oxyCODONE    IR 5 milliGRAM(s) Oral every 3 hours PRN Moderate to Severe Pain (4-10)  oxyCODONE    IR 5 milliGRAM(s) Oral once PRN Moderate to Severe Pain (4-10)  simethicone 80 milliGRAM(s) Chew every 4 hours PRN Gas      OBJECTIVE:    Sedation:        	[X] Alert	 [ ] Drowsy	[ ] Arousable      [ ] Asleep       [ ] Unresponsive    Side Effects:	[X] None 	[ ] Nausea	[ ] Vomiting         [ ] Pruritus  		[ ] Weakness            [ ] Numbness	          [ ] Other:    Vital Signs Last 24 Hrs  T(C): 36.7 (11 Dec 2020 09:00), Max: 36.9 (11 Dec 2020 04:50)  T(F): 98.1 (11 Dec 2020 09:00), Max: 98.4 (11 Dec 2020 04:50)  HR: 84 (11 Dec 2020 09:00) (76 - 92)  BP: 126/73 (11 Dec 2020 09:00) (115/71 - 161/88)  BP(mean): 103 (10 Dec 2020 20:50) (91 - 112)  RR: 18 (11 Dec 2020 09:00) (18 - 27)  SpO2: 97% (11 Dec 2020 09:00) (86% - 100%)    ASSESSMENT/ PLAN  [X] Patient to be transitioned to prn analgesics later today  [X] Pain management per primary service, pain service to sign off   [X]Documentation and Verification of current medications

## 2020-12-12 LAB
ALBUMIN SERPL ELPH-MCNC: 3 G/DL — LOW (ref 3.3–5)
ALP SERPL-CCNC: 128 U/L — HIGH (ref 40–120)
ALT FLD-CCNC: 12 U/L — SIGNIFICANT CHANGE UP (ref 10–45)
ANION GAP SERPL CALC-SCNC: 12 MMOL/L — SIGNIFICANT CHANGE UP (ref 5–17)
APTT BLD: 27.2 SEC — LOW (ref 27.5–35.5)
AST SERPL-CCNC: 19 U/L — SIGNIFICANT CHANGE UP (ref 10–40)
BASOPHILS # BLD AUTO: 0.03 K/UL — SIGNIFICANT CHANGE UP (ref 0–0.2)
BASOPHILS NFR BLD AUTO: 0.2 % — SIGNIFICANT CHANGE UP (ref 0–2)
BILIRUB SERPL-MCNC: 0.1 MG/DL — LOW (ref 0.2–1.2)
BUN SERPL-MCNC: 12 MG/DL — SIGNIFICANT CHANGE UP (ref 7–23)
CALCIUM SERPL-MCNC: 9.1 MG/DL — SIGNIFICANT CHANGE UP (ref 8.4–10.5)
CHLORIDE SERPL-SCNC: 105 MMOL/L — SIGNIFICANT CHANGE UP (ref 96–108)
CO2 SERPL-SCNC: 21 MMOL/L — LOW (ref 22–31)
CREAT SERPL-MCNC: 0.67 MG/DL — SIGNIFICANT CHANGE UP (ref 0.5–1.3)
EOSINOPHIL # BLD AUTO: 0.05 K/UL — SIGNIFICANT CHANGE UP (ref 0–0.5)
EOSINOPHIL NFR BLD AUTO: 0.4 % — SIGNIFICANT CHANGE UP (ref 0–6)
FIBRINOGEN PPP-MCNC: 1076 MG/DL — HIGH (ref 290–520)
GLUCOSE SERPL-MCNC: 77 MG/DL — SIGNIFICANT CHANGE UP (ref 70–99)
HCT VFR BLD CALC: 31.1 % — LOW (ref 34.5–45)
HGB BLD-MCNC: 9.8 G/DL — LOW (ref 11.5–15.5)
IMM GRANULOCYTES NFR BLD AUTO: 0.6 % — SIGNIFICANT CHANGE UP (ref 0–1.5)
INR BLD: 0.87 RATIO — LOW (ref 0.88–1.16)
LDH SERPL L TO P-CCNC: 205 U/L — SIGNIFICANT CHANGE UP (ref 50–242)
LYMPHOCYTES # BLD AUTO: 18.5 % — SIGNIFICANT CHANGE UP (ref 13–44)
LYMPHOCYTES # BLD AUTO: 2.35 K/UL — SIGNIFICANT CHANGE UP (ref 1–3.3)
MCHC RBC-ENTMCNC: 29.9 PG — SIGNIFICANT CHANGE UP (ref 27–34)
MCHC RBC-ENTMCNC: 31.5 GM/DL — LOW (ref 32–36)
MCV RBC AUTO: 94.8 FL — SIGNIFICANT CHANGE UP (ref 80–100)
MONOCYTES # BLD AUTO: 1.29 K/UL — HIGH (ref 0–0.9)
MONOCYTES NFR BLD AUTO: 10.2 % — SIGNIFICANT CHANGE UP (ref 2–14)
NEUTROPHILS # BLD AUTO: 8.9 K/UL — HIGH (ref 1.8–7.4)
NEUTROPHILS NFR BLD AUTO: 70.1 % — SIGNIFICANT CHANGE UP (ref 43–77)
NRBC # BLD: 0 /100 WBCS — SIGNIFICANT CHANGE UP (ref 0–0)
PLATELET # BLD AUTO: 200 K/UL — SIGNIFICANT CHANGE UP (ref 150–400)
POTASSIUM SERPL-MCNC: 4.4 MMOL/L — SIGNIFICANT CHANGE UP (ref 3.5–5.3)
POTASSIUM SERPL-SCNC: 4.4 MMOL/L — SIGNIFICANT CHANGE UP (ref 3.5–5.3)
PROT SERPL-MCNC: 5.8 G/DL — LOW (ref 6–8.3)
PROTHROM AB SERPL-ACNC: 10.5 SEC — LOW (ref 10.6–13.6)
RBC # BLD: 3.28 M/UL — LOW (ref 3.8–5.2)
RBC # FLD: 14.2 % — SIGNIFICANT CHANGE UP (ref 10.3–14.5)
SODIUM SERPL-SCNC: 138 MMOL/L — SIGNIFICANT CHANGE UP (ref 135–145)
URATE SERPL-MCNC: 9.2 MG/DL — HIGH (ref 2.5–7)
WBC # BLD: 12.7 K/UL — HIGH (ref 3.8–10.5)
WBC # FLD AUTO: 12.7 K/UL — HIGH (ref 3.8–10.5)

## 2020-12-12 RX ORDER — LABETALOL HCL 100 MG
300 TABLET ORAL THREE TIMES A DAY
Refills: 0 | Status: DISCONTINUED | OUTPATIENT
Start: 2020-12-12 | End: 2020-12-15

## 2020-12-12 RX ORDER — LABETALOL HCL 100 MG
100 TABLET ORAL ONCE
Refills: 0 | Status: COMPLETED | OUTPATIENT
Start: 2020-12-12 | End: 2020-12-12

## 2020-12-12 RX ORDER — OXYCODONE HYDROCHLORIDE 5 MG/1
5 TABLET ORAL ONCE
Refills: 0 | Status: DISCONTINUED | OUTPATIENT
Start: 2020-12-12 | End: 2020-12-12

## 2020-12-12 RX ORDER — NIFEDIPINE 30 MG
30 TABLET, EXTENDED RELEASE 24 HR ORAL DAILY
Refills: 0 | Status: DISCONTINUED | OUTPATIENT
Start: 2020-12-12 | End: 2020-12-14

## 2020-12-12 RX ADMIN — Medication 300 MILLIGRAM(S): at 17:25

## 2020-12-12 RX ADMIN — OXYCODONE HYDROCHLORIDE 5 MILLIGRAM(S): 5 TABLET ORAL at 15:05

## 2020-12-12 RX ADMIN — OXYCODONE HYDROCHLORIDE 5 MILLIGRAM(S): 5 TABLET ORAL at 09:15

## 2020-12-12 RX ADMIN — OXYCODONE HYDROCHLORIDE 5 MILLIGRAM(S): 5 TABLET ORAL at 01:20

## 2020-12-12 RX ADMIN — Medication 975 MILLIGRAM(S): at 08:44

## 2020-12-12 RX ADMIN — Medication 975 MILLIGRAM(S): at 09:15

## 2020-12-12 RX ADMIN — Medication 200 MILLIGRAM(S): at 08:44

## 2020-12-12 RX ADMIN — Medication 975 MILLIGRAM(S): at 03:15

## 2020-12-12 RX ADMIN — HEPARIN SODIUM 5000 UNIT(S): 5000 INJECTION INTRAVENOUS; SUBCUTANEOUS at 18:34

## 2020-12-12 RX ADMIN — OXYCODONE HYDROCHLORIDE 5 MILLIGRAM(S): 5 TABLET ORAL at 00:41

## 2020-12-12 RX ADMIN — Medication 100 MILLIGRAM(S): at 10:16

## 2020-12-12 RX ADMIN — Medication 975 MILLIGRAM(S): at 20:37

## 2020-12-12 RX ADMIN — Medication 325 MILLIGRAM(S): at 12:25

## 2020-12-12 RX ADMIN — Medication 200 MILLIGRAM(S): at 00:42

## 2020-12-12 RX ADMIN — OXYCODONE HYDROCHLORIDE 5 MILLIGRAM(S): 5 TABLET ORAL at 14:32

## 2020-12-12 RX ADMIN — Medication 975 MILLIGRAM(S): at 14:32

## 2020-12-12 RX ADMIN — Medication 30 MILLIGRAM(S): at 21:54

## 2020-12-12 RX ADMIN — Medication 975 MILLIGRAM(S): at 02:44

## 2020-12-12 RX ADMIN — OXYCODONE HYDROCHLORIDE 5 MILLIGRAM(S): 5 TABLET ORAL at 02:44

## 2020-12-12 RX ADMIN — OXYCODONE HYDROCHLORIDE 5 MILLIGRAM(S): 5 TABLET ORAL at 08:44

## 2020-12-12 RX ADMIN — Medication 975 MILLIGRAM(S): at 21:14

## 2020-12-12 RX ADMIN — Medication 1 TABLET(S): at 12:25

## 2020-12-12 RX ADMIN — HEPARIN SODIUM 5000 UNIT(S): 5000 INJECTION INTRAVENOUS; SUBCUTANEOUS at 05:44

## 2020-12-12 RX ADMIN — Medication 975 MILLIGRAM(S): at 15:05

## 2020-12-12 RX ADMIN — OXYCODONE HYDROCHLORIDE 5 MILLIGRAM(S): 5 TABLET ORAL at 03:15

## 2020-12-12 NOTE — PROGRESS NOTE ADULT - ASSESSMENT
34yo  POD#2 s/p pLTCS c/b siPEC. Patient is feeling well this AM. Denies SOB and has been using IS. No s/s of sPEC at this time.

## 2020-12-12 NOTE — PROGRESS NOTE ADULT - SUBJECTIVE AND OBJECTIVE BOX
R3 Antepartum Note    Patient seen and examined at bedside, no acute overnight events. No acute complaints, pain well controlled. Patient is ambulating, passing flatus, voiding spontaneously, and tolerating regular diet. Denies CP, SOB, N/V, fevers, and chills.    Vital Signs Last 24 Hours  T(C): 37.4 (12-12-20 @ 00:25), Max: 37.9 (12-11-20 @ 21:53)  HR: 87 (12-12-20 @ 00:25) (81 - 89)  BP: 157/89 (12-12-20 @ 00:25) (119/72 - 157/89)  RR: 18 (12-12-20 @ 00:25) (18 - 18)  SpO2: 95% (12-12-20 @ 00:25) (95% - 100%)    I&O's Detail    10 Dec 2020 07:01  -  11 Dec 2020 07:00  --------------------------------------------------------  IN:    dextrose 5% + sodium chloride 0.9%: 121.9 mL    IV PiggyBack: 100 mL    IV PiggyBack: 50 mL    Lactated Ringers: 100 mL    Magnesium Sulfate: 486.4 mL    Oral Fluid: 966 mL    Oxytocin: 269.3 mL  Total IN: 2093.6 mL    OUT:    Estimated Blood Loss (mL): 1000 mL    Indwelling Catheter - Urethral (mL): 3395 mL  Total OUT: 4395 mL    Total NET: -2301.4 mL      11 Dec 2020 07:01  -  12 Dec 2020 04:16  --------------------------------------------------------  IN:    Magnesium Sulfate: 250 mL    Oral Fluid: 600 mL    Oxytocin: 250 mL  Total IN: 1100 mL    OUT:    Indwelling Catheter - Urethral (mL): 2250 mL    Voided (mL): 800 mL  Total OUT: 3050 mL  Total NET: -1950 mL    Physical Exam:  General: NAD  CV: NR, RR, S1, S2, no M/R/G  Lungs: CTA-B  Abdomen: Soft, non-tender, non-distended, +BS  Incision: CDI  Ext: No pain or swelling    Labs:             10.0<L>  14.22<H> )-----------( 204      ( 12-11 @ 07:43 )             30.8<L>               12.0   13.94<H> )-----------( 234      ( 12-10 @ 17:40 )             37.3                14.8   10.68<H> )-----------( 273      ( 12-10 @ 10:33 )             46.9<H>               13.4   7.83  )-----------( 234      ( 12-10 @ 04:26 )             40.1                11.8   7.60  )-----------( 222      ( 12-09 @ 22:06 )             36.7     MEDICATIONS  (STANDING):  acetaminophen   Tablet .. 975 milliGRAM(s) Oral every 6 hours  benzocaine 15 mG/menthol 3.6 mG (Sugar-Free) Lozenge 1 Lozenge Oral three times a day  calcium carbonate    500 mG (Tums) Chewable 1 Tablet(s) Chew three times a day  diphtheria/tetanus/pertussis (acellular) Vaccine (ADAcel) 0.5 milliLiter(s) IntraMuscular once  ferrous    sulfate 325 milliGRAM(s) Oral daily  heparin   Injectable 5000 Unit(s) SubCutaneous every 12 hours  labetalol 200 milliGRAM(s) Oral every 8 hours  lactated ringers. 1000 milliLiter(s) (50 mL/Hr) IV Continuous <Continuous>  oxytocin Infusion 333.333 milliUNIT(s)/Min (1000 mL/Hr) IV Continuous <Continuous>  oxytocin Infusion 2 milliUNIT(s)/Min (2 mL/Hr) IV Continuous <Continuous>  prenatal multivitamin 1 Tablet(s) Oral daily    MEDICATIONS  (PRN):  diphenhydrAMINE 25 milliGRAM(s) Oral every 6 hours PRN Pruritus  lanolin Ointment 1 Application(s) Topical every 6 hours PRN Sore Nipples  magnesium hydroxide Suspension 30 milliLiter(s) Oral two times a day PRN Constipation  oxyCODONE    IR 5 milliGRAM(s) Oral once PRN Moderate to Severe Pain (4-10)  oxyCODONE    IR 5 milliGRAM(s) Oral every 3 hours PRN Moderate to Severe Pain (4-10)  simethicone 80 milliGRAM(s) Chew every 4 hours PRN Gas

## 2020-12-12 NOTE — PROVIDER CONTACT NOTE (CHANGE IN STATUS NOTIFICATION) - BACKGROUND
pt is a day 2 s/p c/s s/p MgSO4 on Labetalol 200mg TID last dose was giving at 0900 and additional 100mg was giving at 1000

## 2020-12-12 NOTE — PROGRESS NOTE ADULT - ATTENDING COMMENTS
I have personally seen and examined the patient this morning. I have edited the above note and agree with the assessment and plan.     Ruthy Jefferson MD  Department of Obstetrics and Gynecology

## 2020-12-12 NOTE — PROGRESS NOTE ADULT - PROBLEM SELECTOR PLAN 1
-f/u AM HELLP labs  -O2 sat wnl, will continue to encourage IS and OOB  -HSQ  -Tylenol, Motrin, Oxy for pain  -c/w Labetalol 200 TID  -routine PP orders    PTaryn De Paz PGY-3 -f/u AM HELLP labs  -O2 sat wnl, will continue to encourage IS and OOB  -HSQ  -Tylenol, Motrin, Oxy for pain  -increase Labetalol to 300 TID  -routine PP orders    PTaryn De Paz PGY-3

## 2020-12-13 RX ORDER — DIPHENHYDRAMINE HCL 50 MG
25 CAPSULE ORAL ONCE
Refills: 0 | Status: DISCONTINUED | OUTPATIENT
Start: 2020-12-13 | End: 2020-12-15

## 2020-12-13 RX ORDER — METOCLOPRAMIDE HCL 10 MG
10 TABLET ORAL ONCE
Refills: 0 | Status: COMPLETED | OUTPATIENT
Start: 2020-12-13 | End: 2020-12-13

## 2020-12-13 RX ADMIN — Medication 300 MILLIGRAM(S): at 08:25

## 2020-12-13 RX ADMIN — Medication 975 MILLIGRAM(S): at 18:33

## 2020-12-13 RX ADMIN — HEPARIN SODIUM 5000 UNIT(S): 5000 INJECTION INTRAVENOUS; SUBCUTANEOUS at 18:38

## 2020-12-13 RX ADMIN — Medication 30 MILLIGRAM(S): at 22:06

## 2020-12-13 RX ADMIN — Medication 975 MILLIGRAM(S): at 02:00

## 2020-12-13 RX ADMIN — Medication 975 MILLIGRAM(S): at 08:24

## 2020-12-13 RX ADMIN — HEPARIN SODIUM 5000 UNIT(S): 5000 INJECTION INTRAVENOUS; SUBCUTANEOUS at 06:17

## 2020-12-13 RX ADMIN — Medication 975 MILLIGRAM(S): at 03:00

## 2020-12-13 RX ADMIN — Medication 300 MILLIGRAM(S): at 16:29

## 2020-12-13 RX ADMIN — Medication 300 MILLIGRAM(S): at 00:58

## 2020-12-13 RX ADMIN — Medication 975 MILLIGRAM(S): at 10:25

## 2020-12-13 RX ADMIN — Medication 10 MILLIGRAM(S): at 18:38

## 2020-12-13 RX ADMIN — Medication 975 MILLIGRAM(S): at 16:23

## 2020-12-13 RX ADMIN — Medication 975 MILLIGRAM(S): at 22:04

## 2020-12-13 NOTE — PROVIDER CONTACT NOTE (OTHER) - RECOMMENDATIONS
Give the  PO Labetalol now and repeat BP in 2 hours.
Labetalol 300mg po given as scheduled dose due now
Labetalol due 0100
Repeat blood pressure in 2 or 4 hours?

## 2020-12-13 NOTE — PROVIDER CONTACT NOTE (OTHER) - ACTION/TREATMENT ORDERED:
Provider wants scheduled Nifedipine 30Xl given as scheduled
Repeat blood pressure in 2 hours
Will evaluate increasing Labetalol
no new orders at this time

## 2020-12-13 NOTE — PROGRESS NOTE ADULT - ASSESSMENT
34yo  POD#2 s/p pLTCS c/b siPEC. s/p MgSO4. Procardia 30XL added overnight for persistent BP in 150s/100s. Pt feels well this morning without symptoms of preeclampsia.    #sPEC  -s/p Mg for 24 hours for seizure prophylaxis   -Not requiring IV antihypertensives since admission  -BPs 140-150/90-100s overnight, Procardia 30 XL added  -Continue Labetalol 300 TID  -Close BP monitoring     #Maternal well-being  -Regular diet  -Continue to monitor VS  -Continue PNV  -SCDs and HSQ for DVT ppx     rohini pgy3 34yo  POD#2 s/p pLTCS c/b siPEC. s/p MgSO4. Procardia 30XL added overnight for persistent BP in 150s/100s.     #sPEC  -s/p Mg for 24 hours for seizure prophylaxis   -Not requiring IV antihypertensives since admission  -BPs 140-150/90-100s overnight, Procardia 30 XL added  -Continue Labetalol 300 TID  - Started 30 Nifedipine overnight, now with HA. Will try to relieve headache with medication this AM and continue medication. Patient to remain until tomorrow to continue BP medication titration.   -Close BP monitoring     #Maternal well-being  -Regular diet  -Continue to monitor VS  -Continue PNV  -SCDs and HSQ for DVT ppx     rohini pgy3

## 2020-12-13 NOTE — PROGRESS NOTE ADULT - SUBJECTIVE AND OBJECTIVE BOX
OB Postpartum Note:  Delivery, POD#3    S: pt seen and examined at bedside. The patient feels well.  Pain is well controlled. She is tolerating a regular diet and passing flatus. She is voiding spontaneously, and ambulating without difficulty. Denies CP/SOB. Denies lightheadedness/dizziness. Denies N/V. Denies headaches, vision changes, RUQ pain.     O:  Vitals:  Vital Signs Last 24 Hrs  T(C): 37 (13 Dec 2020 00:56), Max: 37.7 (12 Dec 2020 17:18)  T(F): 98.6 (13 Dec 2020 00:56), Max: 99.8 (12 Dec 2020 17:18)  HR: 71 (13 Dec 2020 00:56) (71 - 88)  BP: 153/95 (13 Dec 2020 00:56) (142/85 - 158/102)  BP(mean): --  RR: 18 (13 Dec 2020 00:56) (18 - 20)  SpO2: 96% (13 Dec 2020 00:56) (96% - 97%)    MEDICATIONS  (STANDING):  acetaminophen   Tablet .. 975 milliGRAM(s) Oral every 6 hours  benzocaine 15 mG/menthol 3.6 mG (Sugar-Free) Lozenge 1 Lozenge Oral three times a day  calcium carbonate    500 mG (Tums) Chewable 1 Tablet(s) Chew three times a day  diphtheria/tetanus/pertussis (acellular) Vaccine (ADAcel) 0.5 milliLiter(s) IntraMuscular once  ferrous    sulfate 325 milliGRAM(s) Oral daily  heparin   Injectable 5000 Unit(s) SubCutaneous every 12 hours  labetalol 300 milliGRAM(s) Oral three times a day  lactated ringers. 1000 milliLiter(s) (50 mL/Hr) IV Continuous <Continuous>  NIFEdipine XL 30 milliGRAM(s) Oral daily  oxytocin Infusion 333.333 milliUNIT(s)/Min (1000 mL/Hr) IV Continuous <Continuous>  oxytocin Infusion 2 milliUNIT(s)/Min (2 mL/Hr) IV Continuous <Continuous>  prenatal multivitamin 1 Tablet(s) Oral daily    MEDICATIONS  (PRN):  diphenhydrAMINE 25 milliGRAM(s) Oral every 6 hours PRN Pruritus  lanolin Ointment 1 Application(s) Topical every 6 hours PRN Sore Nipples  magnesium hydroxide Suspension 30 milliLiter(s) Oral two times a day PRN Constipation  oxyCODONE    IR 5 milliGRAM(s) Oral once PRN Moderate to Severe Pain (4-10)  oxyCODONE    IR 5 milliGRAM(s) Oral every 3 hours PRN Moderate to Severe Pain (4-10)  simethicone 80 milliGRAM(s) Chew every 4 hours PRN Gas      LABS:  Blood type: O Positive  Rubella IgG: Positive ( 21:43)  RPR: Negative                          9.8<L>   12.70<H> >-----------< 200    (  06:39 )             31.1<L>                        10.0<L>   14.22<H> >-----------< 204    (  07:43 )             30.8<L>                        12.0   13.94<H> >-----------< 234    ( 12-10 @ 17:40 )             37.3                        14.8   10.68<H> >-----------< 273    ( 12-10 @ 10:33 )             46.9<H>                        13.4   7.83 >-----------< 234    ( 12-10 @ 04:26 )             40.1    20 06:39      138  |  105  |  12  ----------------------------<  77  4.4   |  21<L>  |  0.67    20 07:43      132<L>  |  102  |  10  ----------------------------<  104<H>  4.6   |  19<L>  |  0.73    12-10-20 @ 17:40      134<L>  |  104  |  8   ----------------------------<  102<H>  4.5   |  16<L>  |  0.69    12-10-20 @ 10:33      136  |  102  |  8   ----------------------------<  82  4.6   |  20<L>  |  0.69    12-10-20 @ 04:26      135  |  104  |  8   ----------------------------<  76  4.2   |  18<L>  |  0.63        Ca    9.1      12 Dec 2020 06:39  Ca    8.1<L>      11 Dec 2020 07:43  Ca    8.2<L>      10 Dec 2020 17:40  Ca    8.9      10 Dec 2020 10:33  Ca    8.3<L>      10 Dec 2020 04:26  Mg     5.6<H>     12-11  Mg     5.6<H>     12-11  Mg     5.8<H>     12-10  Mg     6.4<H>     12-10  Mg     5.7<H>     12-10    TPro  5.8<L>  /  Alb  3.0<L>  /  TBili  0.1<L>  /  DBili  x   /  AST  19  /  ALT  12  /  AlkPhos  128<H>  20 @ 06:39  TPro  5.5<L>  /  Alb  3.1<L>  /  TBili  0.1<L>  /  DBili  x   /  AST  16  /  ALT  9<L>  /  AlkPhos  131<H>  20 @ 07:43  TPro  5.8<L>  /  Alb  3.2<L>  /  TBili  0.2  /  DBili  x   /  AST  16  /  ALT  7<L>  /  AlkPhos  145<H>  12-10-20 @ 17:40  TPro  7.3  /  Alb  3.9  /  TBili  0.4  /  DBili  x   /  AST  16  /  ALT  13  /  AlkPhos  179<H>  12-10-20 @ 10:33  TPro  6.2  /  Alb  3.3  /  TBili  0.3  /  DBili  x   /  AST  17  /  ALT  11  /  AlkPhos  141<H>  12-10-20 @ 04:26          Physical exam:  Gen: NAD  Abdomen: Soft, nontender, no distension , firm uterine fundus at umbilicus.  Incision: Clean, dry, and intact   Ext: No calf tenderness           OB Postpartum Note:  Delivery, POD#4    S: pt seen and examined at bedside. The patient feels well.  Pain is well controlled. She is tolerating a regular diet and passing flatus. She is voiding spontaneously, and ambulating without difficulty. Denies CP/SOB. Denies lightheadedness/dizziness. Denies N/V. No vision changes, RUQ pain. Patient reports frontal headache not relieved by Tylenol.      O:  Vitals:  Vital Signs Last 24 Hrs  T(C): 37 (13 Dec 2020 00:56), Max: 37.7 (12 Dec 2020 17:18)  T(F): 98.6 (13 Dec 2020 00:56), Max: 99.8 (12 Dec 2020 17:18)  HR: 71 (13 Dec 2020 00:56) (71 - 88)  BP: 153/95 (13 Dec 2020 00:56) (142/85 - 158/102)  BP(mean): --  RR: 18 (13 Dec 2020 00:56) (18 - 20)  SpO2: 96% (13 Dec 2020 00:56) (96% - 97%)    MEDICATIONS  (STANDING):  acetaminophen   Tablet .. 975 milliGRAM(s) Oral every 6 hours  benzocaine 15 mG/menthol 3.6 mG (Sugar-Free) Lozenge 1 Lozenge Oral three times a day  calcium carbonate    500 mG (Tums) Chewable 1 Tablet(s) Chew three times a day  diphtheria/tetanus/pertussis (acellular) Vaccine (ADAcel) 0.5 milliLiter(s) IntraMuscular once  ferrous    sulfate 325 milliGRAM(s) Oral daily  heparin   Injectable 5000 Unit(s) SubCutaneous every 12 hours  labetalol 300 milliGRAM(s) Oral three times a day  lactated ringers. 1000 milliLiter(s) (50 mL/Hr) IV Continuous <Continuous>  NIFEdipine XL 30 milliGRAM(s) Oral daily  oxytocin Infusion 333.333 milliUNIT(s)/Min (1000 mL/Hr) IV Continuous <Continuous>  oxytocin Infusion 2 milliUNIT(s)/Min (2 mL/Hr) IV Continuous <Continuous>  prenatal multivitamin 1 Tablet(s) Oral daily    MEDICATIONS  (PRN):  diphenhydrAMINE 25 milliGRAM(s) Oral every 6 hours PRN Pruritus  lanolin Ointment 1 Application(s) Topical every 6 hours PRN Sore Nipples  magnesium hydroxide Suspension 30 milliLiter(s) Oral two times a day PRN Constipation  oxyCODONE    IR 5 milliGRAM(s) Oral once PRN Moderate to Severe Pain (4-10)  oxyCODONE    IR 5 milliGRAM(s) Oral every 3 hours PRN Moderate to Severe Pain (4-10)  simethicone 80 milliGRAM(s) Chew every 4 hours PRN Gas      LABS:  Blood type: O Positive  Rubella IgG: Positive ( 21:43)  RPR: Negative                          9.8<L>   12.70<H> >-----------< 200    (  06:39 )             31.1<L>                        10.0<L>   14.22<H> >-----------< 204    (  07:43 )             30.8<L>                        12.0   13.94<H> >-----------< 234    ( 12-10 @ 17:40 )             37.3                        14.8   10.68<H> >-----------< 273    ( 12-10 @ 10:33 )             46.9<H>                        13.4   7.83 >-----------< 234    ( 12-10 @ 04:26 )             40.1    20 @ 06:39      138  |  105  |  12  ----------------------------<  77  4.4   |  21<L>  |  0.67    20 07:43      132<L>  |  102  |  10  ----------------------------<  104<H>  4.6   |  19<L>  |  0.73    12-10-20 @ 17:40      134<L>  |  104  |  8   ----------------------------<  102<H>  4.5   |  16<L>  |  0.69    12-10-20 @ 10:33      136  |  102  |  8   ----------------------------<  82  4.6   |  20<L>  |  0.69    12-10-20 @ 04:26      135  |  104  |  8   ----------------------------<  76  4.2   |  18<L>  |  0.63        Ca    9.1      12 Dec 2020 06:39  Ca    8.1<L>      11 Dec 2020 07:43  Ca    8.2<L>      10 Dec 2020 17:40  Ca    8.9      10 Dec 2020 10:33  Ca    8.3<L>      10 Dec 2020 04:26  Mg     5.6<H>     12-11  Mg     5.6<H>     12-11  Mg     5.8<H>     12-10  Mg     6.4<H>     12-10  Mg     5.7<H>     12-10    TPro  5.8<L>  /  Alb  3.0<L>  /  TBili  0.1<L>  /  DBili  x   /  AST  19  /  ALT  12  /  AlkPhos  128<H>  20 @ 06:39  TPro  5.5<L>  /  Alb  3.1<L>  /  TBili  0.1<L>  /  DBili  x   /  AST  16  /  ALT  9<L>  /  AlkPhos  131<H>  20 @ 07:43  TPro  5.8<L>  /  Alb  3.2<L>  /  TBili  0.2  /  DBili  x   /  AST  16  /  ALT  7<L>  /  AlkPhos  145<H>  12-10-20 @ 17:40  TPro  7.3  /  Alb  3.9  /  TBili  0.4  /  DBili  x   /  AST  16  /  ALT  13  /  AlkPhos  179<H>  12-10-20 @ 10:33  TPro  6.2  /  Alb  3.3  /  TBili  0.3  /  DBili  x   /  AST  17  /  ALT  11  /  AlkPhos  141<H>  12-10-20 @ 04:26          Physical exam:  Gen: NAD  Abdomen: Soft, nontender, no distension , firm uterine fundus at umbilicus.  Incision: Clean, dry, and intact   Ext: No calf tenderness

## 2020-12-14 ENCOUNTER — TRANSCRIPTION ENCOUNTER (OUTPATIENT)
Age: 35
End: 2020-12-14

## 2020-12-14 ENCOUNTER — NON-APPOINTMENT (OUTPATIENT)
Age: 35
End: 2020-12-14

## 2020-12-14 RX ORDER — NIFEDIPINE 30 MG
60 TABLET, EXTENDED RELEASE 24 HR ORAL DAILY
Refills: 0 | Status: DISCONTINUED | OUTPATIENT
Start: 2020-12-15 | End: 2020-12-15

## 2020-12-14 RX ORDER — NIFEDIPINE 30 MG
30 TABLET, EXTENDED RELEASE 24 HR ORAL ONCE
Refills: 0 | Status: COMPLETED | OUTPATIENT
Start: 2020-12-14 | End: 2020-12-14

## 2020-12-14 RX ADMIN — Medication 300 MILLIGRAM(S): at 08:34

## 2020-12-14 RX ADMIN — Medication 300 MILLIGRAM(S): at 17:11

## 2020-12-14 RX ADMIN — Medication 975 MILLIGRAM(S): at 17:15

## 2020-12-14 RX ADMIN — Medication 975 MILLIGRAM(S): at 21:06

## 2020-12-14 RX ADMIN — Medication 60 MILLIGRAM(S): at 22:34

## 2020-12-14 RX ADMIN — HEPARIN SODIUM 5000 UNIT(S): 5000 INJECTION INTRAVENOUS; SUBCUTANEOUS at 06:17

## 2020-12-14 RX ADMIN — Medication 325 MILLIGRAM(S): at 13:08

## 2020-12-14 RX ADMIN — Medication 975 MILLIGRAM(S): at 11:50

## 2020-12-14 RX ADMIN — Medication 975 MILLIGRAM(S): at 21:49

## 2020-12-14 RX ADMIN — Medication 1 TABLET(S): at 13:08

## 2020-12-14 RX ADMIN — Medication 975 MILLIGRAM(S): at 10:33

## 2020-12-14 RX ADMIN — HEPARIN SODIUM 5000 UNIT(S): 5000 INJECTION INTRAVENOUS; SUBCUTANEOUS at 17:14

## 2020-12-14 RX ADMIN — Medication 30 MILLIGRAM(S): at 11:07

## 2020-12-14 RX ADMIN — Medication 300 MILLIGRAM(S): at 01:43

## 2020-12-14 RX ADMIN — Medication 975 MILLIGRAM(S): at 03:42

## 2020-12-14 NOTE — DISCHARGE NOTE OB - PATIENT PORTAL LINK FT
You can access the FollowMyHealth Patient Portal offered by [facility] by registering at the following website: [email or web address]. By joining [facility]’s FollowMyHealth portal, you will also be able to view your health information using other applications (apps) compatible with our system.

## 2020-12-14 NOTE — DISCHARGE NOTE OB - PROVIDER TOKENS
PROVIDER:[TOKEN:[[id number]]] PROVIDER:[TOKEN:[[id number]]],PROVIDER:[TOKEN:[[id number]],FOLLOWUP:[1 week]]

## 2020-12-14 NOTE — DIETITIAN INITIAL EVALUATION ADULT. - ADD RECOMMEND
Recommend continue current regular general healthful diet order. Reviewed low sodium diet recommendations with pt to promote blood glucose management moving forward. Pt voiced understanding.

## 2020-12-14 NOTE — PROGRESS NOTE ADULT - SUBJECTIVE AND OBJECTIVE BOX
R3 Antepartum Progress Note    Patient seen and examined at bedside, no acute overnight events. No acute complaints. Mild cramping near incision. Patient is ambulating, passing flatus, voiding spontaneously, and tolerating regular diet. Denies CP, SOB, N/V, fevers, and chills.    Vital Signs Last 24 Hours  T(C): 36.7 (12-14-20 @ 06:19), Max: 36.8 (12-13-20 @ 16:27)  HR: 89 (12-14-20 @ 06:19) (72 - 89)  BP: 155/92 (12-14-20 @ 06:19) (130/80 - 159/99)  RR: 16 (12-14-20 @ 06:19) (16 - 18)  SpO2: 96% (12-14-20 @ 06:19) (96% - 98%)    I&O's Detail      Physical Exam:  General: NAD  CV: NR, RR, S1, S2, no M/R/G  Lungs: CTA-B  Abdomen: Soft, non-tender, softly distended, +BS  Incision: c/d/i with steris-intact  Ext: No pain or swelling    Labs:             9.8<L>  12.70<H> )-----------( 200      ( 12-12 @ 06:39 )             31.1<L>               10.0<L>  14.22<H> )-----------( 204      ( 12-11 @ 07:43 )             30.8<L>               12.0   13.94<H> )-----------( 234      ( 12-10 @ 17:40 )             37.3                14.8   10.68<H> )-----------( 273      ( 12-10 @ 10:33 )             46.9<H>               13.4   7.83  )-----------( 234      ( 12-10 @ 04:26 )             40.1         MEDICATIONS  (STANDING):  acetaminophen   Tablet .. 975 milliGRAM(s) Oral every 6 hours  benzocaine 15 mG/menthol 3.6 mG (Sugar-Free) Lozenge 1 Lozenge Oral three times a day  calcium carbonate    500 mG (Tums) Chewable 1 Tablet(s) Chew three times a day  diphenhydrAMINE 25 milliGRAM(s) Oral once  diphtheria/tetanus/pertussis (acellular) Vaccine (ADAcel) 0.5 milliLiter(s) IntraMuscular once  ferrous    sulfate 325 milliGRAM(s) Oral daily  heparin   Injectable 5000 Unit(s) SubCutaneous every 12 hours  labetalol 300 milliGRAM(s) Oral three times a day  lactated ringers. 1000 milliLiter(s) (50 mL/Hr) IV Continuous <Continuous>  NIFEdipine XL 30 milliGRAM(s) Oral daily  oxytocin Infusion 333.333 milliUNIT(s)/Min (1000 mL/Hr) IV Continuous <Continuous>  oxytocin Infusion 2 milliUNIT(s)/Min (2 mL/Hr) IV Continuous <Continuous>  prenatal multivitamin 1 Tablet(s) Oral daily    MEDICATIONS  (PRN):  diphenhydrAMINE 25 milliGRAM(s) Oral every 6 hours PRN Pruritus  lanolin Ointment 1 Application(s) Topical every 6 hours PRN Sore Nipples  magnesium hydroxide Suspension 30 milliLiter(s) Oral two times a day PRN Constipation  oxyCODONE    IR 5 milliGRAM(s) Oral once PRN Moderate to Severe Pain (4-10)  oxyCODONE    IR 5 milliGRAM(s) Oral every 3 hours PRN Moderate to Severe Pain (4-10)  simethicone 80 milliGRAM(s) Chew every 4 hours PRN Gas

## 2020-12-14 NOTE — DISCHARGE NOTE OB - HOSPITAL COURSE
admitted for delivery for super-imposed preeclampsia. failed labor induction, underwent primary  delivery. received magnesium sulfate for seizure prophylaxis. on nifedipine and labetalol for BP control. iron for anemia

## 2020-12-14 NOTE — DIETITIAN INITIAL EVALUATION ADULT. - OTHER INFO
Pt reports good po intake/appetite at this time. Denies GI distress no N/V/diarrhea or constipation. Pt expresses interest in more healthful diet PTA, particularly lower in sodium to promote improved blood pressure. She plans on breastfeeding infant.

## 2020-12-14 NOTE — DISCHARGE NOTE OB - CARE PROVIDERS DIRECT ADDRESSES
[email or web address]

## 2020-12-14 NOTE — DISCHARGE NOTE OB - CARE PLAN
Principal Discharge DX:	Preeclampsia complicating hypertension  Goal:	well being  Assessment and plan of treatment:	Follow up with your OB this week for a blood pressure check.  Check you blood pressure daily and keep a log of all values. Bring this to your appointments. If your blood pressure is > 140/90, call your doctor. If you experience headache not relieved with Tylenol, visual changes, or epigastric pain, call you doctor.  Secondary Diagnosis:	 delivery delivered   Principal Discharge DX:	Preeclampsia complicating hypertension  Goal:	well being  Assessment and plan of treatment:	Follow up with your OB within 1 week for a blood pressure check.  Check you blood pressure daily and keep a log of all values. Bring this to your appointments. If your blood pressure is > 140/90, call your doctor. If you experience headache not relieved with Tylenol, visual changes, or epigastric pain, call you doctor. Schedule appointment with Cardiology.  Secondary Diagnosis:	 delivery delivered  Goal:	recover from delivery  Assessment and plan of treatment:	follow up in 2 weeks for incision check. follow up in 6 weeks for PPV. pelvic rest. activity as tolerated. regular diet. take iron with vitamin c daily for mild anemia   Principal Discharge DX:	Preeclampsia complicating hypertension  Goal:	well being  Assessment and plan of treatment:	Follow up with your OB within 1 week for a blood pressure check.  Check you blood pressure daily and keep a log of all values. Bring this to your appointments. If your blood pressure is > 140/90, call your doctor. If you experience headache not relieved with Tylenol, visual changes, or epigastric pain, call you doctor. Schedule appointment with Cardiology.  Secondary Diagnosis:	 delivery delivered  Goal:	recover from delivery  Assessment and plan of treatment:	follow up in 2 weeks for incision check. follow up in 6 weeks for PPC.. pelvic rest. activity as tolerated. regular diet. take iron with vitamin C daily for mild anemia  Assessment and plan of treatment:	nothing per vagina x 6 weeks

## 2020-12-14 NOTE — DISCHARGE NOTE OB - MEDICATION SUMMARY - MEDICATIONS TO TAKE
I will START or STAY ON the medications listed below when I get home from the hospital:    acetaminophen 325 mg oral tablet  -- 3 tab(s) by mouth every 6 hours  -- Indication: For for pain    labetalol 300 mg oral tablet  -- 1 tab(s) by mouth 3 times a day   -- It is very important that you take or use this exactly as directed.  Do not skip doses or discontinue unless directed by your doctor.  May cause drowsiness or dizziness.  Some non-prescription drugs may aggravate your condition.  Read all labels carefully.  If a warning appears, check with your doctor before taking.    -- Indication: For for BP control    Procardia XL 60 mg oral tablet, extended release  -- 1 tab(s) by mouth once a day   -- Avoid grapefruit and grapefruit juice while taking this medication.  It is very important that you take or use this exactly as directed.  Do not skip doses or discontinue unless directed by your doctor.  Some non-prescription drugs may aggravate your condition.  Read all labels carefully.  If a warning appears, check with your doctor before taking.  Swallow whole.  Do not crush.    -- Indication: For for BP control    ferrous sulfate 325 mg (65 mg elemental iron) oral tablet  -- 1 tab(s) by mouth once a day  -- Indication: For for nutrition    Prenatal 1 oral capsule  -- Indication: For for nutrition

## 2020-12-14 NOTE — DISCHARGE NOTE OB - PLAN OF CARE
well being Follow up with your OB this week for a blood pressure check.  Check you blood pressure daily and keep a log of all values. Bring this to your appointments. If your blood pressure is > 140/90, call your doctor. If you experience headache not relieved with Tylenol, visual changes, or epigastric pain, call you doctor. bessie all pertinent systems normal Follow up with your OB within 1 week for a blood pressure check.  Check you blood pressure daily and keep a log of all values. Bring this to your appointments. If your blood pressure is > 140/90, call your doctor. If you experience headache not relieved with Tylenol, visual changes, or epigastric pain, call you doctor. Schedule appointment with Cardiology. recover from delivery follow up in 2 weeks for incision check. follow up in 6 weeks for PPV. pelvic rest. activity as tolerated. regular diet. take iron with vitamin c daily for mild anemia follow up in 2 weeks for incision check. follow up in 6 weeks for PPC.. pelvic rest. activity as tolerated. regular diet. take iron with vitamin C daily for mild anemia nothing per vagina x 6 weeks

## 2020-12-14 NOTE — PROGRESS NOTE ADULT - ASSESSMENT
34yo  POD#3 s/p pLTCS c/b siPEC s/p MgSO4 or Procardia 30XL.    #sPEC  -s/p Mg for 24 hours for seizure prophylaxis   -Not requiring IV antihypertensives since admission  -BPs 140-150/90-100s overnight, Procardia 30 XL added  -Continue Labetalol 300 TID  - Started 30 Nifedipine overnight, continuing to have 150s would increase to 60XL  -Close BP monitoring     #Maternal well-being  -Regular diet  -Continue to monitor VS  -Continue PNV  -SCDs and HSQ for DVT ppx     P. Uppalapati PGY-3

## 2020-12-14 NOTE — DISCHARGE NOTE OB - CARE PROVIDER_API CALL
Kimi Doherty)  OBSN  General [id number]  [address] [address]  Phone: [phone]  Fax: [phone]  Follow Up Time:    Kimi Doherty)  OBSN  General [id number]  [address] [facility] [address]  [place] [address]  Phone: [phone]  Fax: [phone]  Follow Up Time:     Kira Orantes  CARDIOVASCULAR DISEASE  [address] [place] [address]  Phone: [phone]  Fax: [phone]  Follow Up Time: 1 week

## 2020-12-15 VITALS
OXYGEN SATURATION: 98 % | HEART RATE: 91 BPM | DIASTOLIC BLOOD PRESSURE: 86 MMHG | TEMPERATURE: 98 F | RESPIRATION RATE: 18 BRPM | SYSTOLIC BLOOD PRESSURE: 132 MMHG

## 2020-12-15 PROBLEM — Z87.09 HISTORY OF ACUTE PHARYNGITIS: Status: RESOLVED | Noted: 2017-12-29 | Resolved: 2020-12-15

## 2020-12-15 RX ORDER — NIFEDIPINE 30 MG
1 TABLET, EXTENDED RELEASE 24 HR ORAL
Qty: 30 | Refills: 0
Start: 2020-12-15 | End: 2021-01-13

## 2020-12-15 RX ORDER — ACETAMINOPHEN 500 MG
3 TABLET ORAL
Qty: 0 | Refills: 0 | DISCHARGE
Start: 2020-12-15

## 2020-12-15 RX ORDER — FERROUS SULFATE 325(65) MG
1 TABLET ORAL
Qty: 0 | Refills: 0 | DISCHARGE
Start: 2020-12-15

## 2020-12-15 RX ORDER — LABETALOL HCL 100 MG
1 TABLET ORAL
Qty: 90 | Refills: 0
Start: 2020-12-15 | End: 2021-01-13

## 2020-12-15 RX ADMIN — Medication 975 MILLIGRAM(S): at 10:54

## 2020-12-15 RX ADMIN — Medication 300 MILLIGRAM(S): at 09:25

## 2020-12-15 RX ADMIN — Medication 1 TABLET(S): at 12:20

## 2020-12-15 RX ADMIN — Medication 325 MILLIGRAM(S): at 12:20

## 2020-12-15 RX ADMIN — HEPARIN SODIUM 5000 UNIT(S): 5000 INJECTION INTRAVENOUS; SUBCUTANEOUS at 05:50

## 2020-12-15 RX ADMIN — Medication 975 MILLIGRAM(S): at 05:50

## 2020-12-15 RX ADMIN — Medication 975 MILLIGRAM(S): at 06:51

## 2020-12-15 RX ADMIN — Medication 975 MILLIGRAM(S): at 11:23

## 2020-12-15 RX ADMIN — Medication 300 MILLIGRAM(S): at 01:15

## 2020-12-15 NOTE — PROGRESS NOTE ADULT - ATTENDING COMMENTS
pt seen and examined. agree with above.  POD #4, s/p primary CD siPEC on labetalol and nifedipine for BP control, s/p Mag sulfate, meeting goals for d/c home  VSS, NAD  FF and below umb. inc c/d/i  pain controlled with tylenol.  cont nifedipine and labetalol for BP control  ambulation encouraged.  baby well, breast/bottle feeding  precautions reviewed  d/c home. f/u 1wk for BP check. f/u 2 wks for incision check, 6 weeks for PPV pt seen and examined. agree with above.  POD #4, s/p primary CD siPEC on labetalol and nifedipine for BP control, s/p Mag sulfate, meeting goals for d/c home  normal-mild range BPs, NAD  FF and below umb. inc c/d/i  pain controlled with tylenol.  cont nifedipine and labetalol for BP control  oral iron with vitamin C for acute blood loss anemia  ambulation encouraged.  baby well, breast/bottle feeding  precautions reviewed  d/c home. f/u 1wk for BP check. f/u 2 wks for incision check, 6 weeks for PPV

## 2020-12-15 NOTE — PROGRESS NOTE ADULT - SUBJECTIVE AND OBJECTIVE BOX
R3 Antepartum Progress Note    Patient seen and examined at bedside, no acute overnight events. No acute complaints, pain well controlled. Patient is ambulating, passing flatus, voiding spontaneously, and tolerating regular diet. Denies CP, SOB, N/V, fevers, and chills.    Vital Signs Last 24 Hours  T(C): 37.1 (12-15-20 @ 05:48), Max: 37.1 (12-14-20 @ 15:03)  HR: 90 (12-15-20 @ 05:48) (74 - 90)  BP: 117/78 (12-15-20 @ 05:48) (117/78 - 148/96)  RR: 18 (12-15-20 @ 05:48) (18 - 18)  SpO2: 97% (12-15-20 @ 05:48) (96% - 99%)    I&O's Detail      Physical Exam:  General: NAD  CV: NR, RR, S1, S2, no M/R/G  Lungs: CTA-B  Abdomen: Soft, non-tender, non-distended, +BS  Incision:c/d/i  Ext: No pain or swelling    Labs:             9.8<L>  12.70<H> )-----------( 200      ( 12-12 @ 06:39 )             31.1<L>               10.0<L>  14.22<H> )-----------( 204      ( 12-11 @ 07:43 )             30.8<L>               12.0   13.94<H> )-----------( 234      ( 12-10 @ 17:40 )             37.3                14.8   10.68<H> )-----------( 273      ( 12-10 @ 10:33 )             46.9<H>        MEDICATIONS  (STANDING):  acetaminophen   Tablet .. 975 milliGRAM(s) Oral every 6 hours  benzocaine 15 mG/menthol 3.6 mG (Sugar-Free) Lozenge 1 Lozenge Oral three times a day  calcium carbonate    500 mG (Tums) Chewable 1 Tablet(s) Chew three times a day  diphenhydrAMINE 25 milliGRAM(s) Oral once  diphtheria/tetanus/pertussis (acellular) Vaccine (ADAcel) 0.5 milliLiter(s) IntraMuscular once  ferrous    sulfate 325 milliGRAM(s) Oral daily  heparin   Injectable 5000 Unit(s) SubCutaneous every 12 hours  labetalol 300 milliGRAM(s) Oral three times a day  lactated ringers. 1000 milliLiter(s) (50 mL/Hr) IV Continuous <Continuous>  NIFEdipine XL 60 milliGRAM(s) Oral daily  oxytocin Infusion 333.333 milliUNIT(s)/Min (1000 mL/Hr) IV Continuous <Continuous>  oxytocin Infusion 2 milliUNIT(s)/Min (2 mL/Hr) IV Continuous <Continuous>  prenatal multivitamin 1 Tablet(s) Oral daily    MEDICATIONS  (PRN):  diphenhydrAMINE 25 milliGRAM(s) Oral every 6 hours PRN Pruritus  lanolin Ointment 1 Application(s) Topical every 6 hours PRN Sore Nipples  magnesium hydroxide Suspension 30 milliLiter(s) Oral two times a day PRN Constipation  oxyCODONE    IR 5 milliGRAM(s) Oral once PRN Moderate to Severe Pain (4-10)  oxyCODONE    IR 5 milliGRAM(s) Oral every 3 hours PRN Moderate to Severe Pain (4-10)  simethicone 80 milliGRAM(s) Chew every 4 hours PRN Gas

## 2020-12-15 NOTE — OB RN TRIAGE NOTE - NS_GESTAGE_OBGYN_ALL_OB_FT
I called to schedule pt for her procedure but there was no answer. I left a detail message on her voicemail to call the office back. [id number]

## 2020-12-15 NOTE — PROGRESS NOTE ADULT - ASSESSMENT
36yo  POD#4 s/p pLTCS c/b siPEC s/p MgSO4 or Procardia 30XL.    #sPEC  -s/p Mg for 24 hours for seizure prophylaxis   -Not requiring IV antihypertensives since admission  -BPs 110-140/70-90s overnight on Procardia 30 XL, much improved  -Continue Labetalol 300 TID  -Close BP monitoring     #Maternal well-being  -Regular diet  -Continue to monitor VS  -Continue PNV  -SCDs and HSQ for DVT ppx   -d/c planning    P. Uppalapati PGY-3

## 2020-12-16 ENCOUNTER — NON-APPOINTMENT (OUTPATIENT)
Age: 35
End: 2020-12-16

## 2020-12-16 PROBLEM — Z3A.37 37 WEEKS GESTATION OF PREGNANCY: Chronic | Status: ACTIVE | Noted: 2020-12-08

## 2020-12-16 PROCEDURE — 86900 BLOOD TYPING SEROLOGIC ABO: CPT

## 2020-12-16 PROCEDURE — 59025 FETAL NON-STRESS TEST: CPT

## 2020-12-16 PROCEDURE — 84550 ASSAY OF BLOOD/URIC ACID: CPT

## 2020-12-16 PROCEDURE — 83735 ASSAY OF MAGNESIUM: CPT

## 2020-12-16 PROCEDURE — 86850 RBC ANTIBODY SCREEN: CPT

## 2020-12-16 PROCEDURE — 71045 X-RAY EXAM CHEST 1 VIEW: CPT

## 2020-12-16 PROCEDURE — C1889: CPT

## 2020-12-16 PROCEDURE — 88307 TISSUE EXAM BY PATHOLOGIST: CPT

## 2020-12-16 PROCEDURE — G0463: CPT

## 2020-12-16 PROCEDURE — 85025 COMPLETE CBC W/AUTO DIFF WBC: CPT

## 2020-12-16 PROCEDURE — 87340 HEPATITIS B SURFACE AG IA: CPT

## 2020-12-16 PROCEDURE — 86901 BLOOD TYPING SEROLOGIC RH(D): CPT

## 2020-12-16 PROCEDURE — 82570 ASSAY OF URINE CREATININE: CPT

## 2020-12-16 PROCEDURE — 85384 FIBRINOGEN ACTIVITY: CPT

## 2020-12-16 PROCEDURE — 59050 FETAL MONITOR W/REPORT: CPT

## 2020-12-16 PROCEDURE — 87635 SARS-COV-2 COVID-19 AMP PRB: CPT

## 2020-12-16 PROCEDURE — 80053 COMPREHEN METABOLIC PANEL: CPT

## 2020-12-16 PROCEDURE — 85730 THROMBOPLASTIN TIME PARTIAL: CPT

## 2020-12-16 PROCEDURE — 85610 PROTHROMBIN TIME: CPT

## 2020-12-16 PROCEDURE — 83615 LACTATE (LD) (LDH) ENZYME: CPT

## 2020-12-16 PROCEDURE — 86769 SARS-COV-2 COVID-19 ANTIBODY: CPT

## 2020-12-16 PROCEDURE — 84156 ASSAY OF PROTEIN URINE: CPT

## 2020-12-16 PROCEDURE — 82962 GLUCOSE BLOOD TEST: CPT

## 2020-12-16 PROCEDURE — 86780 TREPONEMA PALLIDUM: CPT

## 2020-12-16 PROCEDURE — 81001 URINALYSIS AUTO W/SCOPE: CPT

## 2020-12-16 PROCEDURE — 86762 RUBELLA ANTIBODY: CPT

## 2020-12-21 ENCOUNTER — APPOINTMENT (OUTPATIENT)
Dept: OBGYN | Facility: CLINIC | Age: 35
End: 2020-12-21

## 2020-12-28 ENCOUNTER — APPOINTMENT (OUTPATIENT)
Dept: OBGYN | Facility: CLINIC | Age: 35
End: 2020-12-28
Payer: COMMERCIAL

## 2020-12-28 PROCEDURE — 0503F POSTPARTUM CARE VISIT: CPT

## 2021-01-03 ENCOUNTER — FORM ENCOUNTER (OUTPATIENT)
Age: 36
End: 2021-01-03

## 2021-01-04 ENCOUNTER — APPOINTMENT (OUTPATIENT)
Dept: OBGYN | Facility: CLINIC | Age: 36
End: 2021-01-04
Payer: COMMERCIAL

## 2021-01-04 PROCEDURE — 99072 ADDL SUPL MATRL&STAF TM PHE: CPT

## 2021-01-04 PROCEDURE — 99211 OFF/OP EST MAY X REQ PHY/QHP: CPT

## 2021-01-08 ENCOUNTER — NON-APPOINTMENT (OUTPATIENT)
Age: 36
End: 2021-01-08

## 2021-01-11 ENCOUNTER — APPOINTMENT (OUTPATIENT)
Dept: CARDIOLOGY | Facility: CLINIC | Age: 36
End: 2021-01-11
Payer: COMMERCIAL

## 2021-01-11 VITALS
HEIGHT: 67 IN | DIASTOLIC BLOOD PRESSURE: 80 MMHG | SYSTOLIC BLOOD PRESSURE: 122 MMHG | BODY MASS INDEX: 32.96 KG/M2 | HEART RATE: 80 BPM | WEIGHT: 210 LBS | OXYGEN SATURATION: 98 %

## 2021-01-11 PROCEDURE — 99205 OFFICE O/P NEW HI 60 MIN: CPT

## 2021-01-11 PROCEDURE — 93000 ELECTROCARDIOGRAM COMPLETE: CPT

## 2021-01-11 PROCEDURE — 99072 ADDL SUPL MATRL&STAF TM PHE: CPT

## 2021-01-11 NOTE — PHYSICAL EXAM
[Normal Appearance] : normal appearance [General Appearance - Well Developed] : well developed [Well Groomed] : well groomed [General Appearance - Well Nourished] : well nourished [No Deformities] : no deformities [General Appearance - In No Acute Distress] : no acute distress [Normal Conjunctiva] : the conjunctiva exhibited no abnormalities [Eyelids - No Xanthelasma] : the eyelids demonstrated no xanthelasmas [Normal Oral Mucosa] : normal oral mucosa [No Oral Pallor] : no oral pallor [No Oral Cyanosis] : no oral cyanosis [Normal Jugular Venous A Waves Present] : normal jugular venous A waves present [No Jugular Venous Dowell A Waves] : no jugular venous dowell A waves [Normal Jugular Venous V Waves Present] : normal jugular venous V waves present [Heart Rate And Rhythm] : heart rate and rhythm were normal [Heart Sounds] : normal S1 and S2 [Murmurs] : no murmurs present [Respiration, Rhythm And Depth] : normal respiratory rhythm and effort [Exaggerated Use Of Accessory Muscles For Inspiration] : no accessory muscle use [Auscultation Breath Sounds / Voice Sounds] : lungs were clear to auscultation bilaterally [Abdomen Soft] : soft [Abdomen Tenderness] : non-tender [Abdomen Mass (___ Cm)] : no abdominal mass palpated [Abnormal Walk] : normal gait [Gait - Sufficient For Exercise Testing] : the gait was sufficient for exercise testing [Nail Clubbing] : no clubbing of the fingernails [Cyanosis, Localized] : no localized cyanosis [Petechial Hemorrhages (___cm)] : no petechial hemorrhages [Skin Color & Pigmentation] : normal skin color and pigmentation [] : no rash [No Venous Stasis] : no venous stasis [Skin Lesions] : no skin lesions [No Skin Ulcers] : no skin ulcer [No Xanthoma] : no  xanthoma was observed [Oriented To Time, Place, And Person] : oriented to person, place, and time [Affect] : the affect was normal [Mood] : the mood was normal [No Anxiety] : not feeling anxious

## 2021-01-11 NOTE — DISCUSSION/SUMMARY
[FreeTextEntry1] : Patient is a 35 year old woman with h/o cHTN since 2016 (on propranolol and HCTZ)  presented to establish care in setting of recent delivery with preeclampsia. Delivered 12/10/2020 -  - preeclampsia - Mg given - discharged home on labetalol 300 mg tid and procardia 60 daily. BP at home 110s/70s. Patient is nursing\par - will refer for an echocardiogra\par - BP stable. Encouraged the patient to monitor blood pressure at home, keep a log, and report results back to us for evaluation. Based on results, we will adjust the regimen as necessary.\par - ECG with no acute ischemic changes \par - hydration was encouraged\par \par

## 2021-01-11 NOTE — HISTORY OF PRESENT ILLNESS
[FreeTextEntry1] : Patient is a 35 year old woman with h/o cHTN since 2016 (on propranolol and HCTZ)  presented to establish care in setting of recent delivery with preeclampsia. Delivered 12/10/2020 -  - preeclampsia - Mg given - discharged home on labetalol 300 mg tid and procardia 60 daily. BP at home 110s/70s. Patient is nursing\par

## 2021-01-12 ENCOUNTER — APPOINTMENT (OUTPATIENT)
Dept: OBGYN | Facility: CLINIC | Age: 36
End: 2021-01-12

## 2021-01-14 ENCOUNTER — APPOINTMENT (OUTPATIENT)
Dept: OBGYN | Facility: CLINIC | Age: 36
End: 2021-01-14
Payer: COMMERCIAL

## 2021-01-14 PROCEDURE — 0503F POSTPARTUM CARE VISIT: CPT

## 2021-01-20 ENCOUNTER — FORM ENCOUNTER (OUTPATIENT)
Age: 36
End: 2021-01-20

## 2021-01-21 ENCOUNTER — APPOINTMENT (OUTPATIENT)
Dept: OBGYN | Facility: CLINIC | Age: 36
End: 2021-01-21

## 2021-01-23 LAB — SURGICAL PATHOLOGY STUDY: SIGNIFICANT CHANGE UP

## 2021-02-12 ENCOUNTER — APPOINTMENT (OUTPATIENT)
Dept: CV DIAGNOSITCS | Facility: HOSPITAL | Age: 36
End: 2021-02-12

## 2021-02-12 ENCOUNTER — OUTPATIENT (OUTPATIENT)
Dept: OUTPATIENT SERVICES | Facility: HOSPITAL | Age: 36
LOS: 1 days | End: 2021-02-12
Payer: COMMERCIAL

## 2021-02-12 DIAGNOSIS — I10 ESSENTIAL (PRIMARY) HYPERTENSION: ICD-10-CM

## 2021-02-12 DIAGNOSIS — K08.491 PARTIAL LOSS OF TEETH DUE TO OTHER SPECIFIED CAUSE, CLASS I: Chronic | ICD-10-CM

## 2021-02-12 PROCEDURE — 93306 TTE W/DOPPLER COMPLETE: CPT

## 2021-02-12 PROCEDURE — 93306 TTE W/DOPPLER COMPLETE: CPT | Mod: 26

## 2021-02-17 ENCOUNTER — APPOINTMENT (OUTPATIENT)
Dept: CARDIOLOGY | Facility: CLINIC | Age: 36
End: 2021-02-17

## 2021-02-18 ENCOUNTER — APPOINTMENT (OUTPATIENT)
Dept: CARDIOLOGY | Facility: CLINIC | Age: 36
End: 2021-02-18

## 2021-02-18 ENCOUNTER — APPOINTMENT (OUTPATIENT)
Dept: CARDIOLOGY | Facility: CLINIC | Age: 36
End: 2021-02-18
Payer: COMMERCIAL

## 2021-02-18 DIAGNOSIS — Z82.49 FAMILY HISTORY OF ISCHEMIC HEART DISEASE AND OTHER DISEASES OF THE CIRCULATORY SYSTEM: ICD-10-CM

## 2021-02-18 DIAGNOSIS — R63.4 ABNORMAL WEIGHT LOSS: ICD-10-CM

## 2021-02-18 DIAGNOSIS — Z83.3 FAMILY HISTORY OF DIABETES MELLITUS: ICD-10-CM

## 2021-02-18 LAB
ALBUMIN SERPL ELPH-MCNC: 5 G/DL
ALP BLD-CCNC: 110 U/L
ALT SERPL-CCNC: 62 U/L
ANION GAP SERPL CALC-SCNC: 15 MMOL/L
AST SERPL-CCNC: 28 U/L
BASOPHILS # BLD AUTO: 0.03 K/UL
BASOPHILS NFR BLD AUTO: 0.4 %
BILIRUB SERPL-MCNC: 0.3 MG/DL
BUN SERPL-MCNC: 11 MG/DL
CALCIUM SERPL-MCNC: 10.5 MG/DL
CHLORIDE SERPL-SCNC: 104 MMOL/L
CHOLEST SERPL-MCNC: 274 MG/DL
CO2 SERPL-SCNC: 18 MMOL/L
CREAT SERPL-MCNC: 0.7 MG/DL
EOSINOPHIL # BLD AUTO: 0.07 K/UL
EOSINOPHIL NFR BLD AUTO: 1 %
ESTIMATED AVERAGE GLUCOSE: 111 MG/DL
GLUCOSE SERPL-MCNC: 106 MG/DL
HBA1C MFR BLD HPLC: 5.5 %
HCT VFR BLD CALC: 41.9 %
HDLC SERPL-MCNC: 42 MG/DL
HGB BLD-MCNC: 12.8 G/DL
IMM GRANULOCYTES NFR BLD AUTO: 0.3 %
LDLC SERPL CALC-MCNC: 197 MG/DL
LYMPHOCYTES # BLD AUTO: 2.68 K/UL
LYMPHOCYTES NFR BLD AUTO: 39.2 %
MAN DIFF?: NORMAL
MCHC RBC-ENTMCNC: 29 PG
MCHC RBC-ENTMCNC: 30.5 GM/DL
MCV RBC AUTO: 95 FL
MONOCYTES # BLD AUTO: 0.55 K/UL
MONOCYTES NFR BLD AUTO: 8 %
NEUTROPHILS # BLD AUTO: 3.49 K/UL
NEUTROPHILS NFR BLD AUTO: 51.1 %
NONHDLC SERPL-MCNC: 232 MG/DL
PLATELET # BLD AUTO: 311 K/UL
POTASSIUM SERPL-SCNC: 4.3 MMOL/L
PROT SERPL-MCNC: 7.9 G/DL
RBC # BLD: 4.41 M/UL
RBC # FLD: 13.2 %
SODIUM SERPL-SCNC: 137 MMOL/L
TRIGL SERPL-MCNC: 173 MG/DL
TSH SERPL-ACNC: 0.89 UIU/ML
WBC # FLD AUTO: 6.84 K/UL

## 2021-02-18 PROCEDURE — 99214 OFFICE O/P EST MOD 30 MIN: CPT | Mod: 95

## 2021-02-22 ENCOUNTER — TRANSCRIPTION ENCOUNTER (OUTPATIENT)
Age: 36
End: 2021-02-22

## 2021-02-23 ENCOUNTER — APPOINTMENT (OUTPATIENT)
Dept: INTERNAL MEDICINE | Facility: CLINIC | Age: 36
End: 2021-02-23
Payer: COMMERCIAL

## 2021-02-23 ENCOUNTER — NON-APPOINTMENT (OUTPATIENT)
Age: 36
End: 2021-02-23

## 2021-02-23 ENCOUNTER — TRANSCRIPTION ENCOUNTER (OUTPATIENT)
Age: 36
End: 2021-02-23

## 2021-02-23 VITALS
WEIGHT: 214 LBS | DIASTOLIC BLOOD PRESSURE: 90 MMHG | OXYGEN SATURATION: 98 % | HEIGHT: 67 IN | TEMPERATURE: 97.9 F | BODY MASS INDEX: 33.59 KG/M2 | SYSTOLIC BLOOD PRESSURE: 124 MMHG | HEART RATE: 80 BPM

## 2021-02-23 DIAGNOSIS — K21.9 GASTRO-ESOPHAGEAL REFLUX DISEASE W/OUT ESOPHAGITIS: ICD-10-CM

## 2021-02-23 DIAGNOSIS — E83.52 HYPERCALCEMIA: ICD-10-CM

## 2021-02-23 DIAGNOSIS — Z01.84 ENCOUNTER FOR ANTIBODY RESPONSE EXAMINATION: ICD-10-CM

## 2021-02-23 PROCEDURE — 99072 ADDL SUPL MATRL&STAF TM PHE: CPT

## 2021-02-23 PROCEDURE — 93000 ELECTROCARDIOGRAM COMPLETE: CPT

## 2021-02-23 PROCEDURE — 99395 PREV VISIT EST AGE 18-39: CPT | Mod: 25

## 2021-02-23 PROCEDURE — 36415 COLL VENOUS BLD VENIPUNCTURE: CPT

## 2021-02-24 ENCOUNTER — FORM ENCOUNTER (OUTPATIENT)
Age: 36
End: 2021-02-24

## 2021-02-24 LAB
25(OH)D3 SERPL-MCNC: 13.9 NG/ML
ALBUMIN SERPL ELPH-MCNC: 4.7 G/DL
ALP BLD-CCNC: 108 U/L
ALT SERPL-CCNC: 52 U/L
ANION GAP SERPL CALC-SCNC: 14 MMOL/L
AST SERPL-CCNC: 28 U/L
BASOPHILS # BLD AUTO: 0.04 K/UL
BASOPHILS NFR BLD AUTO: 0.7 %
BILIRUB SERPL-MCNC: 0.3 MG/DL
BUN SERPL-MCNC: 12 MG/DL
CALCIUM SERPL-MCNC: 10.5 MG/DL
CALCIUM SERPL-MCNC: 10.5 MG/DL
CHLORIDE SERPL-SCNC: 104 MMOL/L
CHOLEST SERPL-MCNC: 235 MG/DL
CO2 SERPL-SCNC: 21 MMOL/L
CREAT SERPL-MCNC: 0.62 MG/DL
EOSINOPHIL # BLD AUTO: 0.06 K/UL
EOSINOPHIL NFR BLD AUTO: 1 %
ESTIMATED AVERAGE GLUCOSE: 111 MG/DL
GLUCOSE SERPL-MCNC: 88 MG/DL
HBA1C MFR BLD HPLC: 5.5 %
HCT VFR BLD CALC: 40.5 %
HDLC SERPL-MCNC: 38 MG/DL
HGB BLD-MCNC: 12.2 G/DL
IMM GRANULOCYTES NFR BLD AUTO: 0.2 %
LDLC SERPL CALC-MCNC: 163 MG/DL
LDLC SERPL DIRECT ASSAY-MCNC: 168 MG/DL
LYMPHOCYTES # BLD AUTO: 2.41 K/UL
LYMPHOCYTES NFR BLD AUTO: 42.1 %
MAN DIFF?: NORMAL
MCHC RBC-ENTMCNC: 29.1 PG
MCHC RBC-ENTMCNC: 30.1 GM/DL
MCV RBC AUTO: 96.7 FL
MONOCYTES # BLD AUTO: 0.44 K/UL
MONOCYTES NFR BLD AUTO: 7.7 %
NEUTROPHILS # BLD AUTO: 2.77 K/UL
NEUTROPHILS NFR BLD AUTO: 48.3 %
NONHDLC SERPL-MCNC: 197 MG/DL
PARATHYROID HORMONE INTACT: 66 PG/ML
PLATELET # BLD AUTO: 317 K/UL
POTASSIUM SERPL-SCNC: 4.4 MMOL/L
PROT SERPL-MCNC: 8 G/DL
RBC # BLD: 4.19 M/UL
RBC # FLD: 13 %
SARS-COV-2 IGG SERPL IA-ACNC: 0.06 INDEX
SARS-COV-2 IGG SERPL QL IA: NEGATIVE
SAVE SPECIMEN: NORMAL
SODIUM SERPL-SCNC: 138 MMOL/L
T4 FREE SERPL-MCNC: 1.1 NG/DL
TRIGL SERPL-MCNC: 172 MG/DL
TSH SERPL-ACNC: 0.73 UIU/ML
WBC # FLD AUTO: 5.73 K/UL

## 2021-02-25 ENCOUNTER — APPOINTMENT (OUTPATIENT)
Dept: OBGYN | Facility: CLINIC | Age: 36
End: 2021-02-25
Payer: COMMERCIAL

## 2021-02-25 PROCEDURE — 99072 ADDL SUPL MATRL&STAF TM PHE: CPT

## 2021-02-25 PROCEDURE — 76857 US EXAM PELVIC LIMITED: CPT

## 2021-02-25 PROCEDURE — 58300 INSERT INTRAUTERINE DEVICE: CPT

## 2021-03-09 PROBLEM — R63.4 WEIGHT LOSS: Status: ACTIVE | Noted: 2019-03-27

## 2021-03-09 NOTE — ASSESSMENT
[FreeTextEntry1] : 35 year old female with chronic hypertension and a recent pregnancy, delivered in December 2020 with complicating preeclampsia.\par Now nursing her baby. Maintained on Labetalol 300 mg TID and Procardia 60 mg to control blood pressure. \par \par PLAN\par Continue to monitor BP at home\par Continue on current medical regimen: Labetalol 300 mg TID , Procardia 60 mg QD\par \par Recommend exercise stress testing for cardiac screening postpartum\par Encouraged health eating, Mediterranean diet\par Encouraged exercise 150 minutes per week\par Patient has requested to connect with nutritionist, Ginny Brumfield \par \par

## 2021-03-18 DIAGNOSIS — Z01.818 ENCOUNTER FOR OTHER PREPROCEDURAL EXAMINATION: ICD-10-CM

## 2021-03-19 ENCOUNTER — APPOINTMENT (OUTPATIENT)
Dept: DISASTER EMERGENCY | Facility: CLINIC | Age: 36
End: 2021-03-19

## 2021-03-20 LAB — SARS-COV-2 N GENE NPH QL NAA+PROBE: NOT DETECTED

## 2021-03-22 ENCOUNTER — APPOINTMENT (OUTPATIENT)
Dept: CV DIAGNOSTICS | Facility: HOSPITAL | Age: 36
End: 2021-03-22

## 2021-03-22 ENCOUNTER — OUTPATIENT (OUTPATIENT)
Dept: OUTPATIENT SERVICES | Facility: HOSPITAL | Age: 36
LOS: 1 days | End: 2021-03-22
Payer: COMMERCIAL

## 2021-03-22 DIAGNOSIS — K08.491 PARTIAL LOSS OF TEETH DUE TO OTHER SPECIFIED CAUSE, CLASS I: Chronic | ICD-10-CM

## 2021-03-22 DIAGNOSIS — E78.00 PURE HYPERCHOLESTEROLEMIA, UNSPECIFIED: ICD-10-CM

## 2021-03-22 PROCEDURE — 93016 CV STRESS TEST SUPVJ ONLY: CPT

## 2021-03-22 PROCEDURE — 93018 CV STRESS TEST I&R ONLY: CPT

## 2021-03-22 PROCEDURE — 93017 CV STRESS TEST TRACING ONLY: CPT

## 2021-04-06 ENCOUNTER — FORM ENCOUNTER (OUTPATIENT)
Age: 36
End: 2021-04-06

## 2021-04-07 ENCOUNTER — APPOINTMENT (OUTPATIENT)
Dept: OBGYN | Facility: CLINIC | Age: 36
End: 2021-04-07
Payer: COMMERCIAL

## 2021-04-07 PROCEDURE — 99213 OFFICE O/P EST LOW 20 MIN: CPT | Mod: 25

## 2021-04-07 PROCEDURE — 76857 US EXAM PELVIC LIMITED: CPT

## 2021-04-07 PROCEDURE — 99072 ADDL SUPL MATRL&STAF TM PHE: CPT

## 2021-04-08 ENCOUNTER — FORM ENCOUNTER (OUTPATIENT)
Age: 36
End: 2021-04-08

## 2021-04-18 ENCOUNTER — RESULT CHARGE (OUTPATIENT)
Age: 36
End: 2021-04-18

## 2021-04-19 ENCOUNTER — RESULT CHARGE (OUTPATIENT)
Age: 36
End: 2021-04-19

## 2021-04-20 ENCOUNTER — RESULT CHARGE (OUTPATIENT)
Age: 36
End: 2021-04-20

## 2021-04-21 ENCOUNTER — RESULT CHARGE (OUTPATIENT)
Age: 36
End: 2021-04-21

## 2021-04-21 ENCOUNTER — NON-APPOINTMENT (OUTPATIENT)
Age: 36
End: 2021-04-21

## 2021-04-21 ENCOUNTER — APPOINTMENT (OUTPATIENT)
Dept: CARDIOLOGY | Facility: CLINIC | Age: 36
End: 2021-04-21
Payer: COMMERCIAL

## 2021-04-21 VITALS
HEART RATE: 81 BPM | WEIGHT: 218 LBS | OXYGEN SATURATION: 98 % | SYSTOLIC BLOOD PRESSURE: 130 MMHG | HEIGHT: 67 IN | BODY MASS INDEX: 34.21 KG/M2 | DIASTOLIC BLOOD PRESSURE: 84 MMHG

## 2021-04-21 PROCEDURE — 99215 OFFICE O/P EST HI 40 MIN: CPT

## 2021-04-21 PROCEDURE — 99072 ADDL SUPL MATRL&STAF TM PHE: CPT

## 2021-04-21 PROCEDURE — 93000 ELECTROCARDIOGRAM COMPLETE: CPT

## 2021-04-21 RX ORDER — PROPRANOLOL HYDROCHLORIDE 120 MG/1
120 CAPSULE, EXTENDED RELEASE ORAL
Qty: 30 | Refills: 0 | Status: DISCONTINUED | COMMUNITY
Start: 2017-12-03 | End: 2021-04-21

## 2021-04-21 RX ORDER — PANTOPRAZOLE 40 MG/1
40 TABLET, DELAYED RELEASE ORAL DAILY
Qty: 30 | Refills: 3 | Status: DISCONTINUED | COMMUNITY
Start: 2017-03-30 | End: 2021-04-21

## 2021-04-21 NOTE — HISTORY OF PRESENT ILLNESS
[Other Location: e.g. Home (Enter Location, City,State)___] : at [unfilled] [FreeTextEntry1] : Taryn Hale is a 35 year old  with a history of  chronic hypertension diagnosed in  2016 (on propranolol and HCTZ) presents in for follow up. She gave birth to her first child on December 20, 2020-> delivered with preeclampsia, treated with IV Magnesium and discharged home on Labetalol 300 mg TID and Procardia 60 mg daily.  \par BP log 106-134/71-86\par Blood work - , , HDL 38, \par Stress test - normal \par \par Recent echocardiogram performed on February 12, 2021 demonstrated:\par Normal LV systolic function\par No segmental wall motion abnormalities\par NOrmal diastolic function\par Normal RV size and function\par MItral annular calcification.\par No MR\par \par Denies any issues with shortness of breath, palpitations or chest discomfort. She has not been exercising during these cold months inside.

## 2021-04-21 NOTE — PHYSICAL EXAM
[General Appearance - Well Developed] : well developed [Normal Appearance] : normal appearance [Well Groomed] : well groomed [General Appearance - Well Nourished] : well nourished [No Deformities] : no deformities [General Appearance - In No Acute Distress] : no acute distress [Normal Conjunctiva] : the conjunctiva exhibited no abnormalities [Eyelids - No Xanthelasma] : the eyelids demonstrated no xanthelasmas [Normal Oral Mucosa] : normal oral mucosa [No Oral Pallor] : no oral pallor [No Oral Cyanosis] : no oral cyanosis [Normal Jugular Venous A Waves Present] : normal jugular venous A waves present [Normal Jugular Venous V Waves Present] : normal jugular venous V waves present [No Jugular Venous Dowell A Waves] : no jugular venous dowell A waves [Respiration, Rhythm And Depth] : normal respiratory rhythm and effort [Exaggerated Use Of Accessory Muscles For Inspiration] : no accessory muscle use [Auscultation Breath Sounds / Voice Sounds] : lungs were clear to auscultation bilaterally [Normal] : normal [Normal Rate] : normal [Rhythm Regular] : regular [Normal S1] : normal S1 [Normal S2] : normal S2 [Abdomen Tenderness] : non-tender [Abdomen Soft] : soft [Abdomen Mass (___ Cm)] : no abdominal mass palpated [Abnormal Walk] : normal gait [Gait - Sufficient For Exercise Testing] : the gait was sufficient for exercise testing [Nail Clubbing] : no clubbing of the fingernails [Cyanosis, Localized] : no localized cyanosis [Petechial Hemorrhages (___cm)] : no petechial hemorrhages [Skin Color & Pigmentation] : normal skin color and pigmentation [] : no rash [No Venous Stasis] : no venous stasis [No Skin Ulcers] : no skin ulcer [Skin Lesions] : no skin lesions [No Xanthoma] : no  xanthoma was observed [Oriented To Time, Place, And Person] : oriented to person, place, and time [Affect] : the affect was normal [Mood] : the mood was normal [No Anxiety] : not feeling anxious

## 2021-04-21 NOTE — ASSESSMENT
[FreeTextEntry1] : 35 year old female with chronic hypertension and a recent pregnancy, delivered in December 2020 with complicating preeclampsia.\par Now nursing her baby. Maintained on Labetalol 300 mg TID and Procardia 60 mg to control blood pressure. \par \par PLAN\par Continue to monitor BP at home\par Continue on current medical regimen: Decrease Labetalol 200 mg TID , Procardia 60 mg QD\par Call back with BP log in 2 weeks and if optimal will decrease the dose further. \par \par Recommend exercise stress testing for cardiac screening postpartum\par Encouraged health eating, Mediterranean diet\par Encouraged exercise 150 minutes per week\par Patient has requested to connect with nutritionist, Ginny Brumfield \par Will repeat lipid profile in 6 months \par \par F/u in 8 weeks. \par \par

## 2021-04-22 ENCOUNTER — RESULT CHARGE (OUTPATIENT)
Age: 36
End: 2021-04-22

## 2021-04-25 ENCOUNTER — RESULT CHARGE (OUTPATIENT)
Age: 36
End: 2021-04-25

## 2021-04-26 ENCOUNTER — RESULT CHARGE (OUTPATIENT)
Age: 36
End: 2021-04-26

## 2021-04-27 ENCOUNTER — RESULT CHARGE (OUTPATIENT)
Age: 36
End: 2021-04-27

## 2021-04-28 ENCOUNTER — RESULT CHARGE (OUTPATIENT)
Age: 36
End: 2021-04-28

## 2021-04-29 ENCOUNTER — RESULT CHARGE (OUTPATIENT)
Age: 36
End: 2021-04-29

## 2021-05-02 ENCOUNTER — FORM ENCOUNTER (OUTPATIENT)
Age: 36
End: 2021-05-02

## 2021-05-02 ENCOUNTER — RESULT CHARGE (OUTPATIENT)
Age: 36
End: 2021-05-02

## 2021-05-03 ENCOUNTER — RESULT CHARGE (OUTPATIENT)
Age: 36
End: 2021-05-03

## 2021-05-04 ENCOUNTER — RESULT CHARGE (OUTPATIENT)
Age: 36
End: 2021-05-04

## 2021-05-05 ENCOUNTER — RESULT CHARGE (OUTPATIENT)
Age: 36
End: 2021-05-05

## 2021-05-06 ENCOUNTER — RESULT CHARGE (OUTPATIENT)
Age: 36
End: 2021-05-06

## 2021-05-09 ENCOUNTER — RESULT CHARGE (OUTPATIENT)
Age: 36
End: 2021-05-09

## 2021-05-10 ENCOUNTER — RESULT CHARGE (OUTPATIENT)
Age: 36
End: 2021-05-10

## 2021-05-11 ENCOUNTER — RESULT CHARGE (OUTPATIENT)
Age: 36
End: 2021-05-11

## 2021-05-12 ENCOUNTER — RESULT CHARGE (OUTPATIENT)
Age: 36
End: 2021-05-12

## 2021-05-13 ENCOUNTER — RESULT CHARGE (OUTPATIENT)
Age: 36
End: 2021-05-13

## 2021-05-16 ENCOUNTER — RESULT CHARGE (OUTPATIENT)
Age: 36
End: 2021-05-16

## 2021-05-17 ENCOUNTER — RESULT CHARGE (OUTPATIENT)
Age: 36
End: 2021-05-17

## 2021-05-18 ENCOUNTER — RESULT CHARGE (OUTPATIENT)
Age: 36
End: 2021-05-18

## 2021-05-19 ENCOUNTER — TRANSCRIPTION ENCOUNTER (OUTPATIENT)
Age: 36
End: 2021-05-19

## 2021-05-19 ENCOUNTER — RESULT CHARGE (OUTPATIENT)
Age: 36
End: 2021-05-19

## 2021-05-20 ENCOUNTER — RESULT CHARGE (OUTPATIENT)
Age: 36
End: 2021-05-20

## 2021-05-23 ENCOUNTER — RESULT CHARGE (OUTPATIENT)
Age: 36
End: 2021-05-23

## 2021-05-24 ENCOUNTER — RESULT CHARGE (OUTPATIENT)
Age: 36
End: 2021-05-24

## 2021-05-25 ENCOUNTER — RESULT CHARGE (OUTPATIENT)
Age: 36
End: 2021-05-25

## 2021-05-26 ENCOUNTER — RESULT CHARGE (OUTPATIENT)
Age: 36
End: 2021-05-26

## 2021-05-27 ENCOUNTER — RESULT CHARGE (OUTPATIENT)
Age: 36
End: 2021-05-27

## 2021-05-30 ENCOUNTER — RESULT CHARGE (OUTPATIENT)
Age: 36
End: 2021-05-30

## 2021-05-31 ENCOUNTER — RESULT CHARGE (OUTPATIENT)
Age: 36
End: 2021-05-31

## 2021-06-01 ENCOUNTER — RESULT CHARGE (OUTPATIENT)
Age: 36
End: 2021-06-01

## 2021-06-02 ENCOUNTER — RESULT CHARGE (OUTPATIENT)
Age: 36
End: 2021-06-02

## 2021-06-03 ENCOUNTER — RESULT CHARGE (OUTPATIENT)
Age: 36
End: 2021-06-03

## 2021-06-06 ENCOUNTER — RESULT CHARGE (OUTPATIENT)
Age: 36
End: 2021-06-06

## 2021-06-07 ENCOUNTER — RESULT CHARGE (OUTPATIENT)
Age: 36
End: 2021-06-07

## 2021-06-08 ENCOUNTER — RESULT CHARGE (OUTPATIENT)
Age: 36
End: 2021-06-08

## 2021-06-09 ENCOUNTER — RESULT CHARGE (OUTPATIENT)
Age: 36
End: 2021-06-09

## 2021-06-10 ENCOUNTER — RESULT CHARGE (OUTPATIENT)
Age: 36
End: 2021-06-10

## 2021-06-13 ENCOUNTER — RESULT CHARGE (OUTPATIENT)
Age: 36
End: 2021-06-13

## 2021-06-14 ENCOUNTER — RESULT CHARGE (OUTPATIENT)
Age: 36
End: 2021-06-14

## 2021-06-15 ENCOUNTER — RESULT CHARGE (OUTPATIENT)
Age: 36
End: 2021-06-15

## 2021-06-16 ENCOUNTER — APPOINTMENT (OUTPATIENT)
Dept: CARDIOLOGY | Facility: CLINIC | Age: 36
End: 2021-06-16
Payer: COMMERCIAL

## 2021-06-16 ENCOUNTER — RESULT CHARGE (OUTPATIENT)
Age: 36
End: 2021-06-16

## 2021-06-16 VITALS
BODY MASS INDEX: 34.94 KG/M2 | HEART RATE: 79 BPM | DIASTOLIC BLOOD PRESSURE: 87 MMHG | OXYGEN SATURATION: 99 % | HEIGHT: 66.5 IN | WEIGHT: 220 LBS | SYSTOLIC BLOOD PRESSURE: 126 MMHG

## 2021-06-16 PROCEDURE — 93000 ELECTROCARDIOGRAM COMPLETE: CPT

## 2021-06-16 PROCEDURE — 99214 OFFICE O/P EST MOD 30 MIN: CPT

## 2021-06-16 PROCEDURE — 99072 ADDL SUPL MATRL&STAF TM PHE: CPT

## 2021-06-16 RX ORDER — ERGOCALCIFEROL 1.25 MG/1
1.25 MG CAPSULE ORAL
Qty: 16 | Refills: 0 | Status: DISCONTINUED | COMMUNITY
Start: 2021-02-24 | End: 2021-06-16

## 2021-06-16 NOTE — PHYSICAL EXAM
[General Appearance - Well Developed] : well developed [Normal Appearance] : normal appearance [Well Groomed] : well groomed [General Appearance - Well Nourished] : well nourished [No Deformities] : no deformities [General Appearance - In No Acute Distress] : no acute distress [Normal Conjunctiva] : the conjunctiva exhibited no abnormalities [Eyelids - No Xanthelasma] : the eyelids demonstrated no xanthelasmas [Normal Oral Mucosa] : normal oral mucosa [No Oral Pallor] : no oral pallor [No Oral Cyanosis] : no oral cyanosis [Normal Jugular Venous A Waves Present] : normal jugular venous A waves present [Normal Jugular Venous V Waves Present] : normal jugular venous V waves present [No Jugular Venous Dowell A Waves] : no jugular venous dowell A waves [Respiration, Rhythm And Depth] : normal respiratory rhythm and effort [Exaggerated Use Of Accessory Muscles For Inspiration] : no accessory muscle use [Auscultation Breath Sounds / Voice Sounds] : lungs were clear to auscultation bilaterally [Normal] : normal [Normal Rate] : normal [Rhythm Regular] : regular [Normal S1] : normal S1 [Normal S2] : normal S2 [Abdomen Soft] : soft [Abdomen Tenderness] : non-tender [Abdomen Mass (___ Cm)] : no abdominal mass palpated [Abnormal Walk] : normal gait [Gait - Sufficient For Exercise Testing] : the gait was sufficient for exercise testing [Nail Clubbing] : no clubbing of the fingernails [Cyanosis, Localized] : no localized cyanosis [Petechial Hemorrhages (___cm)] : no petechial hemorrhages [Skin Color & Pigmentation] : normal skin color and pigmentation [] : no rash [No Venous Stasis] : no venous stasis [Skin Lesions] : no skin lesions [No Skin Ulcers] : no skin ulcer [No Xanthoma] : no  xanthoma was observed [Oriented To Time, Place, And Person] : oriented to person, place, and time [Affect] : the affect was normal [Mood] : the mood was normal [No Anxiety] : not feeling anxious

## 2021-06-16 NOTE — HISTORY OF PRESENT ILLNESS
[FreeTextEntry1] : Taryn Hale is a 35 year old  with a history of  chronic hypertension diagnosed in  2016 (on propranolol and HCTZ) presents in for follow up. She gave birth to her first child on December 20, 2020-> delivered with preeclampsia, treated with IV Magnesium and discharged home on Labetalol 300 mg TID and Procardia 60 mg daily.  \par BP log 106-134/71-86\par Blood work - , , HDL 38, \par Stress test - normal \par \par Recent echocardiogram performed on February 12, 2021 demonstrated:\par Normal LV systolic function\par No segmental wall motion abnormalities\par NOrmal diastolic function\par Normal RV size and function\par MItral annular calcification.\par No MR\par \par Denies any issues with shortness of breath, palpitations or chest discomfort. She has not been exercising during these cold months inside.

## 2021-06-17 ENCOUNTER — RESULT CHARGE (OUTPATIENT)
Age: 36
End: 2021-06-17

## 2021-06-20 ENCOUNTER — RESULT CHARGE (OUTPATIENT)
Age: 36
End: 2021-06-20

## 2021-06-21 ENCOUNTER — LABORATORY RESULT (OUTPATIENT)
Age: 36
End: 2021-06-21

## 2021-06-21 ENCOUNTER — APPOINTMENT (OUTPATIENT)
Dept: INTERNAL MEDICINE | Facility: CLINIC | Age: 36
End: 2021-06-21
Payer: COMMERCIAL

## 2021-06-21 ENCOUNTER — RESULT CHARGE (OUTPATIENT)
Age: 36
End: 2021-06-21

## 2021-06-21 VITALS
TEMPERATURE: 97.9 F | SYSTOLIC BLOOD PRESSURE: 130 MMHG | BODY MASS INDEX: 34.46 KG/M2 | HEIGHT: 66.5 IN | WEIGHT: 217 LBS | DIASTOLIC BLOOD PRESSURE: 80 MMHG

## 2021-06-21 PROCEDURE — 36415 COLL VENOUS BLD VENIPUNCTURE: CPT

## 2021-06-21 PROCEDURE — 99214 OFFICE O/P EST MOD 30 MIN: CPT | Mod: 25

## 2021-06-21 PROCEDURE — 99072 ADDL SUPL MATRL&STAF TM PHE: CPT

## 2021-06-22 ENCOUNTER — RESULT CHARGE (OUTPATIENT)
Age: 36
End: 2021-06-22

## 2021-06-22 NOTE — HISTORY OF PRESENT ILLNESS
[FreeTextEntry1] : Patient presents for follow up evaluation for multiple medical concerns including:\par  [de-identified] : She is a new mother to a 6 month old baby girl.\par Her pregnancy was complicated by hypertension--was controlled with Labetolol 200mg tid--recently this was decreased to 200mg bid and she is also now on Procardia XL 60mg qd by her cardiologist Dr. Orantes.\par \par She is feeling tired as a new mom.\par SHe does note headaches since decreasing the labetolol to bid

## 2021-06-22 NOTE — REVIEW OF SYSTEMS
[Fatigue] : fatigue [Recent Change In Weight] : ~T recent weight change [Headache] : headache [FreeTextEntry2] : weight gain with pregnancy

## 2021-06-23 ENCOUNTER — RESULT CHARGE (OUTPATIENT)
Age: 36
End: 2021-06-23

## 2021-06-23 LAB
ALBUMIN SERPL ELPH-MCNC: 5.1 G/DL
ALP BLD-CCNC: 97 U/L
ALT SERPL-CCNC: 27 U/L
ANION GAP SERPL CALC-SCNC: 10 MMOL/L
AST SERPL-CCNC: 21 U/L
BASOPHILS # BLD AUTO: 0.04 K/UL
BASOPHILS NFR BLD AUTO: 0.6 %
BILIRUB SERPL-MCNC: 0.4 MG/DL
BUN SERPL-MCNC: 11 MG/DL
CALCIUM SERPL-MCNC: 10.7 MG/DL
CHLORIDE SERPL-SCNC: 106 MMOL/L
CHOLEST SERPL-MCNC: 254 MG/DL
CO2 SERPL-SCNC: 20 MMOL/L
CREAT SERPL-MCNC: 0.69 MG/DL
EOSINOPHIL # BLD AUTO: 0.06 K/UL
EOSINOPHIL NFR BLD AUTO: 0.9 %
ESTIMATED AVERAGE GLUCOSE: 117 MG/DL
GLUCOSE SERPL-MCNC: 90 MG/DL
HBA1C MFR BLD HPLC: 5.7 %
HCT VFR BLD CALC: 46.5 %
HDLC SERPL-MCNC: 42 MG/DL
HGB BLD-MCNC: 14.1 G/DL
IMM GRANULOCYTES NFR BLD AUTO: 0.2 %
LDLC SERPL CALC-MCNC: 186 MG/DL
LDLC SERPL DIRECT ASSAY-MCNC: 178 MG/DL
LYMPHOCYTES # BLD AUTO: 2.42 K/UL
LYMPHOCYTES NFR BLD AUTO: 37.2 %
MAN DIFF?: NORMAL
MCHC RBC-ENTMCNC: 29.6 PG
MCHC RBC-ENTMCNC: 30.3 GM/DL
MCV RBC AUTO: 97.5 FL
MONOCYTES # BLD AUTO: 0.59 K/UL
MONOCYTES NFR BLD AUTO: 9.1 %
NEUTROPHILS # BLD AUTO: 3.39 K/UL
NEUTROPHILS NFR BLD AUTO: 52 %
NONHDLC SERPL-MCNC: 212 MG/DL
PLATELET # BLD AUTO: 337 K/UL
POTASSIUM SERPL-SCNC: 4.4 MMOL/L
PROT SERPL-MCNC: 8.3 G/DL
RBC # BLD: 4.77 M/UL
RBC # FLD: 12.5 %
SAVE SPECIMEN: NORMAL
SODIUM SERPL-SCNC: 137 MMOL/L
T3 SERPL-MCNC: 124 NG/DL
T3FREE SERPL-MCNC: 3.5 PG/ML
T3RU NFR SERPL: 1.1 TBI
T4 FREE SERPL-MCNC: 1.2 NG/DL
T4 SERPL-MCNC: 7.9 UG/DL
TRIGL SERPL-MCNC: 132 MG/DL
TSH SERPL-ACNC: 0.89 UIU/ML
WBC # FLD AUTO: 6.51 K/UL

## 2021-06-24 ENCOUNTER — RESULT CHARGE (OUTPATIENT)
Age: 36
End: 2021-06-24

## 2021-06-25 ENCOUNTER — RX CHANGE (OUTPATIENT)
Age: 36
End: 2021-06-25

## 2021-06-25 RX ORDER — LABETALOL HYDROCHLORIDE 200 MG/1
200 TABLET, FILM COATED ORAL 3 TIMES DAILY
Qty: 180 | Refills: 3 | Status: DISCONTINUED | COMMUNITY
Start: 2020-07-08 | End: 2021-06-25

## 2021-06-27 ENCOUNTER — RESULT CHARGE (OUTPATIENT)
Age: 36
End: 2021-06-27

## 2021-06-28 ENCOUNTER — RESULT CHARGE (OUTPATIENT)
Age: 36
End: 2021-06-28

## 2021-06-29 ENCOUNTER — RESULT CHARGE (OUTPATIENT)
Age: 36
End: 2021-06-29

## 2021-06-30 ENCOUNTER — RESULT CHARGE (OUTPATIENT)
Age: 36
End: 2021-06-30

## 2021-07-01 ENCOUNTER — RESULT CHARGE (OUTPATIENT)
Age: 36
End: 2021-07-01

## 2021-07-04 ENCOUNTER — RESULT CHARGE (OUTPATIENT)
Age: 36
End: 2021-07-04

## 2021-07-05 ENCOUNTER — RESULT CHARGE (OUTPATIENT)
Age: 36
End: 2021-07-05

## 2021-07-06 ENCOUNTER — RESULT CHARGE (OUTPATIENT)
Age: 36
End: 2021-07-06

## 2021-07-07 ENCOUNTER — RESULT CHARGE (OUTPATIENT)
Age: 36
End: 2021-07-07

## 2021-07-08 ENCOUNTER — RESULT CHARGE (OUTPATIENT)
Age: 36
End: 2021-07-08

## 2021-07-11 ENCOUNTER — RESULT CHARGE (OUTPATIENT)
Age: 36
End: 2021-07-11

## 2021-07-12 ENCOUNTER — RESULT CHARGE (OUTPATIENT)
Age: 36
End: 2021-07-12

## 2021-07-13 ENCOUNTER — RESULT CHARGE (OUTPATIENT)
Age: 36
End: 2021-07-13

## 2021-07-14 ENCOUNTER — RESULT CHARGE (OUTPATIENT)
Age: 36
End: 2021-07-14

## 2021-07-15 ENCOUNTER — RESULT CHARGE (OUTPATIENT)
Age: 36
End: 2021-07-15

## 2021-07-18 ENCOUNTER — RESULT CHARGE (OUTPATIENT)
Age: 36
End: 2021-07-18

## 2021-07-19 ENCOUNTER — RESULT CHARGE (OUTPATIENT)
Age: 36
End: 2021-07-19

## 2021-07-20 ENCOUNTER — RESULT CHARGE (OUTPATIENT)
Age: 36
End: 2021-07-20

## 2021-07-21 ENCOUNTER — RESULT CHARGE (OUTPATIENT)
Age: 36
End: 2021-07-21

## 2021-07-22 ENCOUNTER — RESULT CHARGE (OUTPATIENT)
Age: 36
End: 2021-07-22

## 2021-07-25 ENCOUNTER — RESULT CHARGE (OUTPATIENT)
Age: 36
End: 2021-07-25

## 2021-07-26 ENCOUNTER — RESULT CHARGE (OUTPATIENT)
Age: 36
End: 2021-07-26

## 2021-07-27 ENCOUNTER — RESULT CHARGE (OUTPATIENT)
Age: 36
End: 2021-07-27

## 2021-07-28 ENCOUNTER — RESULT CHARGE (OUTPATIENT)
Age: 36
End: 2021-07-28

## 2021-07-29 ENCOUNTER — RESULT CHARGE (OUTPATIENT)
Age: 36
End: 2021-07-29

## 2021-08-01 ENCOUNTER — RESULT CHARGE (OUTPATIENT)
Age: 36
End: 2021-08-01

## 2021-08-02 ENCOUNTER — RESULT CHARGE (OUTPATIENT)
Age: 36
End: 2021-08-02

## 2021-08-03 ENCOUNTER — RESULT CHARGE (OUTPATIENT)
Age: 36
End: 2021-08-03

## 2021-08-04 ENCOUNTER — RESULT CHARGE (OUTPATIENT)
Age: 36
End: 2021-08-04

## 2021-08-05 ENCOUNTER — RESULT CHARGE (OUTPATIENT)
Age: 36
End: 2021-08-05

## 2021-08-08 ENCOUNTER — RESULT CHARGE (OUTPATIENT)
Age: 36
End: 2021-08-08

## 2021-08-09 ENCOUNTER — RESULT CHARGE (OUTPATIENT)
Age: 36
End: 2021-08-09

## 2021-08-10 ENCOUNTER — RESULT CHARGE (OUTPATIENT)
Age: 36
End: 2021-08-10

## 2021-08-11 ENCOUNTER — RESULT CHARGE (OUTPATIENT)
Age: 36
End: 2021-08-11

## 2021-08-12 ENCOUNTER — RESULT CHARGE (OUTPATIENT)
Age: 36
End: 2021-08-12

## 2021-08-15 ENCOUNTER — RESULT CHARGE (OUTPATIENT)
Age: 36
End: 2021-08-15

## 2021-08-16 ENCOUNTER — RESULT CHARGE (OUTPATIENT)
Age: 36
End: 2021-08-16

## 2021-08-17 ENCOUNTER — RESULT CHARGE (OUTPATIENT)
Age: 36
End: 2021-08-17

## 2021-08-18 ENCOUNTER — RESULT CHARGE (OUTPATIENT)
Age: 36
End: 2021-08-18

## 2021-08-19 ENCOUNTER — RESULT CHARGE (OUTPATIENT)
Age: 36
End: 2021-08-19

## 2021-08-22 ENCOUNTER — RESULT CHARGE (OUTPATIENT)
Age: 36
End: 2021-08-22

## 2021-08-23 ENCOUNTER — RESULT CHARGE (OUTPATIENT)
Age: 36
End: 2021-08-23

## 2021-08-23 ENCOUNTER — APPOINTMENT (OUTPATIENT)
Dept: INTERNAL MEDICINE | Facility: CLINIC | Age: 36
End: 2021-08-23
Payer: COMMERCIAL

## 2021-08-23 VITALS
DIASTOLIC BLOOD PRESSURE: 80 MMHG | HEIGHT: 66.5 IN | BODY MASS INDEX: 34.31 KG/M2 | SYSTOLIC BLOOD PRESSURE: 128 MMHG | TEMPERATURE: 98.1 F | WEIGHT: 216 LBS | OXYGEN SATURATION: 98 % | HEART RATE: 80 BPM

## 2021-08-23 DIAGNOSIS — R53.83 OTHER FATIGUE: ICD-10-CM

## 2021-08-23 DIAGNOSIS — R73.01 IMPAIRED FASTING GLUCOSE: ICD-10-CM

## 2021-08-23 PROCEDURE — 99213 OFFICE O/P EST LOW 20 MIN: CPT | Mod: 25

## 2021-08-23 PROCEDURE — 36415 COLL VENOUS BLD VENIPUNCTURE: CPT

## 2021-08-23 NOTE — OB RN TRIAGE NOTE - NS PRO PASSIVE SMOKE EXP
No Burow's Advancement Flap Text: Given the location of the defect, inherent tension at the surgical site, and the proximity to free margins a Burow’s advancement flap was deemed most appropriate for wound reconstruction. The risks, benefits, and possible outcomes of this procedure were discussed along with the risks, benefits, and possible outcomes of other options for wound repair (including but not limited to: complex closure, other flaps, grafts, and second intention). The patient verbalized understanding and consent to the outlined procedure. The patient verbalized understanding that intraoperative conditions may necessitate a change in the outlined procedure resulting in modification of the original surgical plan. This decision may be made at the discretion of the surgeon, and is due to factors subject to change or that are difficult to predict preoperatively. Using a sterile surgical marker, an appropriate Burow’s advancement flap was drawn incorporating the defect and placing the expected incisions within the relaxed skin tension lines where possible. The surgical site and surrounding skin were prepped and draped in sterile fashion.  Standing cones were removed from opposite ends of the defect within the appropriate surgical plane, repositioning as necessary based upon local tension, proximity to free margins/other anatomic structures, and position of the relaxed skin tension lines. The position of one dog ear was modified, moving the standing cone along a lateral plane to lie in a more favorable location for closure.  The resulting defect was undermined widely and bilaterally in the appropriate surgical plane taking care to preserve local arteries, veins, nerves, and other structures of anatomic importance. This created a sliding flap capable of advancing across the defect to close the wound under minimal tension. Hemostasis was achieved and then the wound was sutured in layered fashion.

## 2021-08-23 NOTE — HISTORY OF PRESENT ILLNESS
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 Gave birth to her first child 8 months ago. Echo and recent stress test were normal. Hx of nasal congestion. Follows ENT periodically. Denies CP, Cough or syncope. \par - Unable to loose weight--plans to see nutritionist\par - Abd bloating

## 2021-08-23 NOTE — ASSESSMENT
[FreeTextEntry1] : Forms filled pending results\par Check labs\par Echo and recent Stress test reviewed\par Weight loss crucial- strategies discussed- see Nutritionist as planned\par ENT F.U\par Initiate exercise regiemen

## 2021-08-24 ENCOUNTER — RESULT CHARGE (OUTPATIENT)
Age: 36
End: 2021-08-24

## 2021-08-25 ENCOUNTER — RESULT CHARGE (OUTPATIENT)
Age: 36
End: 2021-08-25

## 2021-08-26 ENCOUNTER — TRANSCRIPTION ENCOUNTER (OUTPATIENT)
Age: 36
End: 2021-08-26

## 2021-08-26 ENCOUNTER — RESULT CHARGE (OUTPATIENT)
Age: 36
End: 2021-08-26

## 2021-08-26 LAB
ALBUMIN SERPL ELPH-MCNC: 4.8 G/DL
ALP BLD-CCNC: 106 U/L
ALT SERPL-CCNC: 27 U/L
ANION GAP SERPL CALC-SCNC: 14 MMOL/L
AST SERPL-CCNC: 17 U/L
BASOPHILS # BLD AUTO: 0.05 K/UL
BASOPHILS NFR BLD AUTO: 0.8 %
BILIRUB SERPL-MCNC: 0.2 MG/DL
BUN SERPL-MCNC: 14 MG/DL
CALCIUM SERPL-MCNC: 10.4 MG/DL
CHLORIDE SERPL-SCNC: 105 MMOL/L
CO2 SERPL-SCNC: 19 MMOL/L
CREAT SERPL-MCNC: 0.77 MG/DL
EOSINOPHIL # BLD AUTO: 0.08 K/UL
EOSINOPHIL NFR BLD AUTO: 1.4 %
ESTIMATED AVERAGE GLUCOSE: 111 MG/DL
GLUCOSE SERPL-MCNC: 94 MG/DL
HBA1C MFR BLD HPLC: 5.5 %
HCT VFR BLD CALC: 43.3 %
HGB BLD-MCNC: 13.6 G/DL
IMM GRANULOCYTES NFR BLD AUTO: 0.3 %
LYMPHOCYTES # BLD AUTO: 1.93 K/UL
LYMPHOCYTES NFR BLD AUTO: 32.7 %
M TB IFN-G BLD-IMP: NEGATIVE
MAN DIFF?: NORMAL
MCHC RBC-ENTMCNC: 30.2 PG
MCHC RBC-ENTMCNC: 31.4 GM/DL
MCV RBC AUTO: 96.2 FL
MONOCYTES # BLD AUTO: 0.51 K/UL
MONOCYTES NFR BLD AUTO: 8.6 %
NEUTROPHILS # BLD AUTO: 3.32 K/UL
NEUTROPHILS NFR BLD AUTO: 56.2 %
PLATELET # BLD AUTO: 310 K/UL
POTASSIUM SERPL-SCNC: 4.5 MMOL/L
PROT SERPL-MCNC: 7.1 G/DL
QUANTIFERON TB PLUS MITOGEN MINUS NIL: 8.66 IU/ML
QUANTIFERON TB PLUS NIL: 0.01 IU/ML
QUANTIFERON TB PLUS TB1 MINUS NIL: 0.01 IU/ML
QUANTIFERON TB PLUS TB2 MINUS NIL: 0 IU/ML
RBC # BLD: 4.5 M/UL
RBC # FLD: 12.5 %
SODIUM SERPL-SCNC: 138 MMOL/L
T4 FREE SERPL-MCNC: 1.1 NG/DL
TSH SERPL-ACNC: 0.86 UIU/ML
WBC # FLD AUTO: 5.91 K/UL

## 2021-08-29 ENCOUNTER — RESULT CHARGE (OUTPATIENT)
Age: 36
End: 2021-08-29

## 2021-08-30 ENCOUNTER — RESULT CHARGE (OUTPATIENT)
Age: 36
End: 2021-08-30

## 2021-08-31 ENCOUNTER — RESULT CHARGE (OUTPATIENT)
Age: 36
End: 2021-08-31

## 2021-09-01 ENCOUNTER — RESULT CHARGE (OUTPATIENT)
Age: 36
End: 2021-09-01

## 2021-09-02 ENCOUNTER — RESULT CHARGE (OUTPATIENT)
Age: 36
End: 2021-09-02

## 2021-09-05 ENCOUNTER — RESULT CHARGE (OUTPATIENT)
Age: 36
End: 2021-09-05

## 2021-09-06 ENCOUNTER — RESULT CHARGE (OUTPATIENT)
Age: 36
End: 2021-09-06

## 2021-09-07 ENCOUNTER — RESULT CHARGE (OUTPATIENT)
Age: 36
End: 2021-09-07

## 2021-09-08 ENCOUNTER — RESULT CHARGE (OUTPATIENT)
Age: 36
End: 2021-09-08

## 2021-09-09 ENCOUNTER — RESULT CHARGE (OUTPATIENT)
Age: 36
End: 2021-09-09

## 2021-09-10 ENCOUNTER — NON-APPOINTMENT (OUTPATIENT)
Age: 36
End: 2021-09-10

## 2021-09-12 ENCOUNTER — RESULT CHARGE (OUTPATIENT)
Age: 36
End: 2021-09-12

## 2021-09-13 ENCOUNTER — RESULT CHARGE (OUTPATIENT)
Age: 36
End: 2021-09-13

## 2021-09-14 ENCOUNTER — RESULT CHARGE (OUTPATIENT)
Age: 36
End: 2021-09-14

## 2021-09-15 ENCOUNTER — RESULT CHARGE (OUTPATIENT)
Age: 36
End: 2021-09-15

## 2021-09-16 ENCOUNTER — RESULT CHARGE (OUTPATIENT)
Age: 36
End: 2021-09-16

## 2021-09-19 ENCOUNTER — RESULT CHARGE (OUTPATIENT)
Age: 36
End: 2021-09-19

## 2021-09-20 ENCOUNTER — RESULT CHARGE (OUTPATIENT)
Age: 36
End: 2021-09-20

## 2021-09-20 ENCOUNTER — APPOINTMENT (OUTPATIENT)
Dept: CARDIOLOGY | Facility: CLINIC | Age: 36
End: 2021-09-20

## 2021-09-21 ENCOUNTER — RESULT CHARGE (OUTPATIENT)
Age: 36
End: 2021-09-21

## 2021-09-22 ENCOUNTER — RESULT CHARGE (OUTPATIENT)
Age: 36
End: 2021-09-22

## 2021-09-23 ENCOUNTER — RESULT CHARGE (OUTPATIENT)
Age: 36
End: 2021-09-23

## 2021-09-26 ENCOUNTER — RESULT CHARGE (OUTPATIENT)
Age: 36
End: 2021-09-26

## 2021-09-27 ENCOUNTER — RESULT CHARGE (OUTPATIENT)
Age: 36
End: 2021-09-27

## 2021-09-28 ENCOUNTER — RESULT CHARGE (OUTPATIENT)
Age: 36
End: 2021-09-28

## 2021-09-29 ENCOUNTER — RESULT CHARGE (OUTPATIENT)
Age: 36
End: 2021-09-29

## 2021-09-30 ENCOUNTER — RESULT CHARGE (OUTPATIENT)
Age: 36
End: 2021-09-30

## 2021-10-03 ENCOUNTER — RESULT CHARGE (OUTPATIENT)
Age: 36
End: 2021-10-03

## 2021-10-04 ENCOUNTER — RESULT CHARGE (OUTPATIENT)
Age: 36
End: 2021-10-04

## 2021-10-05 ENCOUNTER — RESULT CHARGE (OUTPATIENT)
Age: 36
End: 2021-10-05

## 2021-10-06 ENCOUNTER — RESULT CHARGE (OUTPATIENT)
Age: 36
End: 2021-10-06

## 2021-10-07 ENCOUNTER — RESULT CHARGE (OUTPATIENT)
Age: 36
End: 2021-10-07

## 2021-10-10 ENCOUNTER — RESULT CHARGE (OUTPATIENT)
Age: 36
End: 2021-10-10

## 2021-10-11 ENCOUNTER — RESULT CHARGE (OUTPATIENT)
Age: 36
End: 2021-10-11

## 2021-10-12 ENCOUNTER — RESULT CHARGE (OUTPATIENT)
Age: 36
End: 2021-10-12

## 2021-10-13 ENCOUNTER — RESULT CHARGE (OUTPATIENT)
Age: 36
End: 2021-10-13

## 2021-10-14 ENCOUNTER — RESULT CHARGE (OUTPATIENT)
Age: 36
End: 2021-10-14

## 2021-10-17 ENCOUNTER — RESULT CHARGE (OUTPATIENT)
Age: 36
End: 2021-10-17

## 2021-10-18 ENCOUNTER — RESULT CHARGE (OUTPATIENT)
Age: 36
End: 2021-10-18

## 2021-10-19 ENCOUNTER — RESULT CHARGE (OUTPATIENT)
Age: 36
End: 2021-10-19

## 2021-10-20 ENCOUNTER — RESULT CHARGE (OUTPATIENT)
Age: 36
End: 2021-10-20

## 2021-10-21 ENCOUNTER — RESULT CHARGE (OUTPATIENT)
Age: 36
End: 2021-10-21

## 2021-10-24 ENCOUNTER — RESULT CHARGE (OUTPATIENT)
Age: 36
End: 2021-10-24

## 2021-10-25 ENCOUNTER — RESULT CHARGE (OUTPATIENT)
Age: 36
End: 2021-10-25

## 2021-10-26 ENCOUNTER — RESULT CHARGE (OUTPATIENT)
Age: 36
End: 2021-10-26

## 2021-10-27 ENCOUNTER — RESULT CHARGE (OUTPATIENT)
Age: 36
End: 2021-10-27

## 2021-10-28 ENCOUNTER — RESULT CHARGE (OUTPATIENT)
Age: 36
End: 2021-10-28

## 2021-10-31 ENCOUNTER — RESULT CHARGE (OUTPATIENT)
Age: 36
End: 2021-10-31

## 2021-11-01 ENCOUNTER — RESULT CHARGE (OUTPATIENT)
Age: 36
End: 2021-11-01

## 2021-11-02 ENCOUNTER — RESULT CHARGE (OUTPATIENT)
Age: 36
End: 2021-11-02

## 2021-11-03 ENCOUNTER — RESULT CHARGE (OUTPATIENT)
Age: 36
End: 2021-11-03

## 2021-11-04 ENCOUNTER — RESULT CHARGE (OUTPATIENT)
Age: 36
End: 2021-11-04

## 2021-11-05 DIAGNOSIS — Z78.9 OTHER SPECIFIED HEALTH STATUS: ICD-10-CM

## 2021-11-07 ENCOUNTER — RESULT CHARGE (OUTPATIENT)
Age: 36
End: 2021-11-07

## 2021-11-08 ENCOUNTER — RESULT CHARGE (OUTPATIENT)
Age: 36
End: 2021-11-08

## 2021-11-09 ENCOUNTER — RESULT CHARGE (OUTPATIENT)
Age: 36
End: 2021-11-09

## 2021-11-10 ENCOUNTER — RESULT CHARGE (OUTPATIENT)
Age: 36
End: 2021-11-10

## 2021-11-11 ENCOUNTER — RESULT CHARGE (OUTPATIENT)
Age: 36
End: 2021-11-11

## 2021-11-14 ENCOUNTER — RESULT CHARGE (OUTPATIENT)
Age: 36
End: 2021-11-14

## 2021-11-15 ENCOUNTER — RESULT CHARGE (OUTPATIENT)
Age: 36
End: 2021-11-15

## 2021-11-16 ENCOUNTER — RESULT CHARGE (OUTPATIENT)
Age: 36
End: 2021-11-16

## 2021-11-17 ENCOUNTER — RESULT CHARGE (OUTPATIENT)
Age: 36
End: 2021-11-17

## 2021-11-18 ENCOUNTER — RESULT CHARGE (OUTPATIENT)
Age: 36
End: 2021-11-18

## 2021-11-21 ENCOUNTER — RESULT CHARGE (OUTPATIENT)
Age: 36
End: 2021-11-21

## 2021-11-22 ENCOUNTER — RESULT CHARGE (OUTPATIENT)
Age: 36
End: 2021-11-22

## 2021-11-23 ENCOUNTER — RESULT CHARGE (OUTPATIENT)
Age: 36
End: 2021-11-23

## 2021-11-24 ENCOUNTER — RESULT CHARGE (OUTPATIENT)
Age: 36
End: 2021-11-24

## 2021-11-25 ENCOUNTER — RESULT CHARGE (OUTPATIENT)
Age: 36
End: 2021-11-25

## 2021-11-28 ENCOUNTER — RESULT CHARGE (OUTPATIENT)
Age: 36
End: 2021-11-28

## 2021-11-29 ENCOUNTER — RESULT CHARGE (OUTPATIENT)
Age: 36
End: 2021-11-29

## 2021-11-30 ENCOUNTER — RESULT CHARGE (OUTPATIENT)
Age: 36
End: 2021-11-30

## 2021-12-01 ENCOUNTER — RESULT CHARGE (OUTPATIENT)
Age: 36
End: 2021-12-01

## 2021-12-02 ENCOUNTER — RESULT CHARGE (OUTPATIENT)
Age: 36
End: 2021-12-02

## 2021-12-05 ENCOUNTER — RESULT CHARGE (OUTPATIENT)
Age: 36
End: 2021-12-05

## 2021-12-06 ENCOUNTER — RESULT CHARGE (OUTPATIENT)
Age: 36
End: 2021-12-06

## 2021-12-07 ENCOUNTER — RESULT CHARGE (OUTPATIENT)
Age: 36
End: 2021-12-07

## 2021-12-08 ENCOUNTER — RESULT CHARGE (OUTPATIENT)
Age: 36
End: 2021-12-08

## 2021-12-09 ENCOUNTER — RESULT CHARGE (OUTPATIENT)
Age: 36
End: 2021-12-09

## 2021-12-09 ENCOUNTER — APPOINTMENT (OUTPATIENT)
Dept: OBGYN | Facility: CLINIC | Age: 36
End: 2021-12-09
Payer: SELF-PAY

## 2021-12-09 VITALS
WEIGHT: 209 LBS | HEIGHT: 66.5 IN | SYSTOLIC BLOOD PRESSURE: 130 MMHG | DIASTOLIC BLOOD PRESSURE: 72 MMHG | BODY MASS INDEX: 33.19 KG/M2

## 2021-12-09 PROCEDURE — 99213 OFFICE O/P EST LOW 20 MIN: CPT

## 2021-12-12 ENCOUNTER — RESULT CHARGE (OUTPATIENT)
Age: 36
End: 2021-12-12

## 2021-12-13 ENCOUNTER — RESULT CHARGE (OUTPATIENT)
Age: 36
End: 2021-12-13

## 2021-12-13 DIAGNOSIS — N76.0 ACUTE VAGINITIS: ICD-10-CM

## 2021-12-14 ENCOUNTER — RESULT CHARGE (OUTPATIENT)
Age: 36
End: 2021-12-14

## 2021-12-15 ENCOUNTER — RESULT CHARGE (OUTPATIENT)
Age: 36
End: 2021-12-15

## 2021-12-15 ENCOUNTER — TRANSCRIPTION ENCOUNTER (OUTPATIENT)
Age: 36
End: 2021-12-15

## 2021-12-15 LAB
CANDIDA VAG CYTO: NOT DETECTED
G VAGINALIS+PREV SP MTYP VAG QL MICRO: NOT DETECTED
T VAGINALIS VAG QL WET PREP: NOT DETECTED

## 2021-12-15 NOTE — DISCUSSION/SUMMARY
[FreeTextEntry1] : Pelvic pain\par -IUD in place, confirmed on sono \par -Culture obtained\par -FU in feb for annual.

## 2021-12-15 NOTE — HISTORY OF PRESENT ILLNESS
[FreeTextEntry1] : 36 year old female presents to the office for follow up IUD check. \par \par Pt had Mirena IUD placed 02/25/21. C/o lower abdomen and groin pain x 3 wks. She notes she has been trying to lose weight/exercise more recently. \par \par ObHx: C/S 12/10/2020- female 5.14, PEC

## 2021-12-15 NOTE — END OF VISIT
[FreeTextEntry3] : I, Efren Bell, acted solely as a scribe for Dr. Lenora Parham on 12/09/2021.\par \par All medical record entries made by the scribe were at my, Dr. Lenora Parham, direction and personally dictated by me on 12/09/2021. I have reviewed the chart and agree that the record accurately reflects my personal performance of the history, physical exam, assessment and plan. I have also personally directed, reviewed, and agreed with the chart.

## 2021-12-15 NOTE — PROCEDURE
[Locate IUD] : locate IUD [Transvaginal Ultrasound] : transvaginal ultrasound [FreeTextEntry4] : IUD in appropriate position

## 2021-12-16 ENCOUNTER — RESULT CHARGE (OUTPATIENT)
Age: 36
End: 2021-12-16

## 2021-12-19 ENCOUNTER — RESULT CHARGE (OUTPATIENT)
Age: 36
End: 2021-12-19

## 2021-12-20 ENCOUNTER — RESULT CHARGE (OUTPATIENT)
Age: 36
End: 2021-12-20

## 2021-12-21 ENCOUNTER — RESULT CHARGE (OUTPATIENT)
Age: 36
End: 2021-12-21

## 2021-12-22 ENCOUNTER — RESULT CHARGE (OUTPATIENT)
Age: 36
End: 2021-12-22

## 2021-12-23 ENCOUNTER — RESULT CHARGE (OUTPATIENT)
Age: 36
End: 2021-12-23

## 2021-12-26 ENCOUNTER — RESULT CHARGE (OUTPATIENT)
Age: 36
End: 2021-12-26

## 2021-12-27 ENCOUNTER — RESULT CHARGE (OUTPATIENT)
Age: 36
End: 2021-12-27

## 2021-12-28 ENCOUNTER — RESULT CHARGE (OUTPATIENT)
Age: 36
End: 2021-12-28

## 2021-12-29 ENCOUNTER — RESULT CHARGE (OUTPATIENT)
Age: 36
End: 2021-12-29

## 2021-12-30 ENCOUNTER — RESULT CHARGE (OUTPATIENT)
Age: 36
End: 2021-12-30

## 2022-01-02 ENCOUNTER — RESULT CHARGE (OUTPATIENT)
Age: 37
End: 2022-01-02

## 2022-01-03 ENCOUNTER — RESULT CHARGE (OUTPATIENT)
Age: 37
End: 2022-01-03

## 2022-01-04 ENCOUNTER — RESULT CHARGE (OUTPATIENT)
Age: 37
End: 2022-01-04

## 2022-01-05 ENCOUNTER — RESULT CHARGE (OUTPATIENT)
Age: 37
End: 2022-01-05

## 2022-01-06 ENCOUNTER — RESULT CHARGE (OUTPATIENT)
Age: 37
End: 2022-01-06

## 2022-01-09 ENCOUNTER — RESULT CHARGE (OUTPATIENT)
Age: 37
End: 2022-01-09

## 2022-01-10 ENCOUNTER — RESULT CHARGE (OUTPATIENT)
Age: 37
End: 2022-01-10

## 2022-01-11 ENCOUNTER — RESULT CHARGE (OUTPATIENT)
Age: 37
End: 2022-01-11

## 2022-01-12 ENCOUNTER — RESULT CHARGE (OUTPATIENT)
Age: 37
End: 2022-01-12

## 2022-01-13 ENCOUNTER — RESULT CHARGE (OUTPATIENT)
Age: 37
End: 2022-01-13

## 2022-01-16 ENCOUNTER — RESULT CHARGE (OUTPATIENT)
Age: 37
End: 2022-01-16

## 2022-01-17 ENCOUNTER — RESULT CHARGE (OUTPATIENT)
Age: 37
End: 2022-01-17

## 2022-01-18 ENCOUNTER — RESULT CHARGE (OUTPATIENT)
Age: 37
End: 2022-01-18

## 2022-01-19 ENCOUNTER — RESULT CHARGE (OUTPATIENT)
Age: 37
End: 2022-01-19

## 2022-01-20 ENCOUNTER — RESULT CHARGE (OUTPATIENT)
Age: 37
End: 2022-01-20

## 2022-01-23 ENCOUNTER — RESULT CHARGE (OUTPATIENT)
Age: 37
End: 2022-01-23

## 2022-01-24 ENCOUNTER — RESULT CHARGE (OUTPATIENT)
Age: 37
End: 2022-01-24

## 2022-01-25 ENCOUNTER — RESULT CHARGE (OUTPATIENT)
Age: 37
End: 2022-01-25

## 2022-01-26 ENCOUNTER — RESULT CHARGE (OUTPATIENT)
Age: 37
End: 2022-01-26

## 2022-01-27 ENCOUNTER — RESULT CHARGE (OUTPATIENT)
Age: 37
End: 2022-01-27

## 2022-01-30 ENCOUNTER — RESULT CHARGE (OUTPATIENT)
Age: 37
End: 2022-01-30

## 2022-01-31 ENCOUNTER — RESULT CHARGE (OUTPATIENT)
Age: 37
End: 2022-01-31

## 2022-02-01 ENCOUNTER — RESULT CHARGE (OUTPATIENT)
Age: 37
End: 2022-02-01

## 2022-02-02 ENCOUNTER — RESULT CHARGE (OUTPATIENT)
Age: 37
End: 2022-02-02

## 2022-02-02 ENCOUNTER — RX RENEWAL (OUTPATIENT)
Age: 37
End: 2022-02-02

## 2022-02-03 ENCOUNTER — RESULT CHARGE (OUTPATIENT)
Age: 37
End: 2022-02-03

## 2022-02-06 ENCOUNTER — RESULT CHARGE (OUTPATIENT)
Age: 37
End: 2022-02-06

## 2022-02-07 ENCOUNTER — RESULT CHARGE (OUTPATIENT)
Age: 37
End: 2022-02-07

## 2022-02-08 ENCOUNTER — RESULT CHARGE (OUTPATIENT)
Age: 37
End: 2022-02-08

## 2022-02-09 ENCOUNTER — RESULT CHARGE (OUTPATIENT)
Age: 37
End: 2022-02-09

## 2022-02-10 ENCOUNTER — RESULT CHARGE (OUTPATIENT)
Age: 37
End: 2022-02-10

## 2022-02-13 ENCOUNTER — RESULT CHARGE (OUTPATIENT)
Age: 37
End: 2022-02-13

## 2022-02-14 ENCOUNTER — APPOINTMENT (OUTPATIENT)
Dept: INTERNAL MEDICINE | Facility: CLINIC | Age: 37
End: 2022-02-14
Payer: MEDICAID

## 2022-02-14 ENCOUNTER — NON-APPOINTMENT (OUTPATIENT)
Age: 37
End: 2022-02-14

## 2022-02-14 ENCOUNTER — LABORATORY RESULT (OUTPATIENT)
Age: 37
End: 2022-02-14

## 2022-02-14 ENCOUNTER — RESULT CHARGE (OUTPATIENT)
Age: 37
End: 2022-02-14

## 2022-02-14 VITALS
TEMPERATURE: 97.7 F | BODY MASS INDEX: 33.03 KG/M2 | SYSTOLIC BLOOD PRESSURE: 144 MMHG | WEIGHT: 208 LBS | DIASTOLIC BLOOD PRESSURE: 96 MMHG | HEIGHT: 66.5 IN

## 2022-02-14 DIAGNOSIS — Z11.1 ENCOUNTER FOR SCREENING FOR RESPIRATORY TUBERCULOSIS: ICD-10-CM

## 2022-02-14 DIAGNOSIS — Z01.84 ENCOUNTER FOR ANTIBODY RESPONSE EXAMINATION: ICD-10-CM

## 2022-02-14 PROCEDURE — 99214 OFFICE O/P EST MOD 30 MIN: CPT | Mod: 25

## 2022-02-14 RX ORDER — PNV NO.95/FERROUS FUM/FOLIC AC 28MG-0.8MG
TABLET ORAL
Refills: 0 | Status: COMPLETED | COMMUNITY
End: 2022-02-14

## 2022-02-14 RX ORDER — IRON/IRON ASP GLY/FA/MV-MIN 38 125-25-1MG
TABLET ORAL
Refills: 0 | Status: COMPLETED | COMMUNITY
End: 2022-02-14

## 2022-02-14 NOTE — PHYSICAL EXAM
[No Acute Distress] : no acute distress [Well Nourished] : well nourished [Well Developed] : well developed [Well-Appearing] : well-appearing [Normal Sclera/Conjunctiva] : normal sclera/conjunctiva [PERRL] : pupils equal round and reactive to light [EOMI] : extraocular movements intact [Normal Outer Ear/Nose] : the outer ears and nose were normal in appearance [Normal Oropharynx] : the oropharynx was normal [No JVD] : no jugular venous distention [Supple] : supple [Thyroid Normal, No Nodules] : the thyroid was normal and there were no nodules present [No Respiratory Distress] : no respiratory distress  [No Accessory Muscle Use] : no accessory muscle use [Clear to Auscultation] : lungs were clear to auscultation bilaterally [Normal Rate] : normal rate  [Regular Rhythm] : with a regular rhythm [Normal S1, S2] : normal S1 and S2 [No Murmur] : no murmur heard [No Carotid Bruits] : no carotid bruits [No Abdominal Bruit] : a ~M bruit was not heard ~T in the abdomen [No Varicosities] : no varicosities [Pedal Pulses Present] : the pedal pulses are present [No Edema] : there was no peripheral edema [No Palpable Aorta] : no palpable aorta [No Extremity Clubbing/Cyanosis] : no extremity clubbing/cyanosis [Soft] : abdomen soft [Non Tender] : non-tender [Non-distended] : non-distended [No Masses] : no abdominal mass palpated [No HSM] : no HSM [Normal Bowel Sounds] : normal bowel sounds [Normal Posterior Cervical Nodes] : no posterior cervical lymphadenopathy [Normal Anterior Cervical Nodes] : no anterior cervical lymphadenopathy [No CVA Tenderness] : no CVA  tenderness [No Spinal Tenderness] : no spinal tenderness [No Joint Swelling] : no joint swelling [Grossly Normal Strength/Tone] : grossly normal strength/tone [No Rash] : no rash [Coordination Grossly Intact] : coordination grossly intact [No Focal Deficits] : no focal deficits [Normal Gait] : normal gait [Deep Tendon Reflexes (DTR)] : deep tendon reflexes were 2+ and symmetric [Normal Affect] : the affect was normal [Normal Insight/Judgement] : insight and judgment were intact

## 2022-02-14 NOTE — HISTORY OF PRESENT ILLNESS
[FreeTextEntry1] : Patient presents for follow up evaluation for multiple medical concerns including:\par Recent headaches and labile hypertension [de-identified] : Patient is now a mother of a healthy 14-month old daughter.\par She relocated from [place] to [place] and is currently working as a  at a nursery school in [place].  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  Her blood pressure readings have been somewhat labile.  She admits her weight is stable and she has not been as compliant about following a low-salt diet over the past month.\par \par She did have labile hypertension during her pregnancy and was followed by the cardiologist .  Her blood pressure medications were changed to labetalol 200 mg p.o. 3 times daily and nifedipine ER 60 mg daily during her pregnancy.  She has been taking the medications daily but does admit that she often forgets to take her third dose of labetalol . Over the past few months- her headaches have gotten worse and blood pressure readings have been up and down.  She does note some increased stress recently looking for new teaching position and being a full-time mom.\par \par She does have a follow-up evaluation with her cardiologist in 2 months but wants to consider changing medications sooner.\par She prefers taking a once daily medication if possible to improve compliance.  Prior to her pregnancy she was on propranolol  mg as well as hydrochlorothiazide 25 mg with good control of her blood pressure.\par

## 2022-02-14 NOTE — REVIEW OF SYSTEMS
[Recent Change In Weight] : ~T recent weight change [Headache] : headache [Chest Pain] : no chest pain [Palpitations] : no palpitations

## 2022-02-14 NOTE — ASSESSMENT
[FreeTextEntry1] : Patient received the Moderna Covid vaccines on the following dates:\par \par 03/31/2021\par 04/28/2021\par 01/24/2022\par \par I recommend stopping labetalol 3 times daily and stopping nifedipine ER 60 mg and switching to Tenoretic 100/25 mg daily.  She will monitor her blood pressure and I will call her next week.  She understands we may need to add a second agent to control her blood pressure.

## 2022-02-15 ENCOUNTER — RESULT CHARGE (OUTPATIENT)
Age: 37
End: 2022-02-15

## 2022-02-15 DIAGNOSIS — E83.52 HYPERCALCEMIA: ICD-10-CM

## 2022-02-16 ENCOUNTER — RESULT CHARGE (OUTPATIENT)
Age: 37
End: 2022-02-16

## 2022-02-16 LAB
CALCIUM SERPL-MCNC: 10.9 MG/DL
PARATHYROID HORMONE INTACT: 27 PG/ML

## 2022-02-17 ENCOUNTER — RESULT CHARGE (OUTPATIENT)
Age: 37
End: 2022-02-17

## 2022-02-17 LAB
ALBUMIN SERPL ELPH-MCNC: 5.3 G/DL
ALP BLD-CCNC: 109 U/L
ALT SERPL-CCNC: 28 U/L
ANION GAP SERPL CALC-SCNC: 13 MMOL/L
AST SERPL-CCNC: 21 U/L
BASOPHILS # BLD AUTO: 0.03 K/UL
BASOPHILS NFR BLD AUTO: 0.4 %
BILIRUB SERPL-MCNC: 0.4 MG/DL
BUN SERPL-MCNC: 12 MG/DL
CA-I SERPL-SCNC: 1.12 MMOL/L
CALCIUM SERPL-MCNC: 11.1 MG/DL
CHLORIDE SERPL-SCNC: 103 MMOL/L
CHOLEST SERPL-MCNC: 267 MG/DL
CO2 SERPL-SCNC: 23 MMOL/L
COVID-19 NUCLEOCAPSID  GAM ANTIBODY INTERPRETATION: NEGATIVE
CREAT SERPL-MCNC: 0.65 MG/DL
EOSINOPHIL # BLD AUTO: 0.09 K/UL
EOSINOPHIL NFR BLD AUTO: 1.1 %
ERYTHROCYTE [SEDIMENTATION RATE] IN BLOOD BY WESTERGREN METHOD: 20 MM/HR
ESTIMATED AVERAGE GLUCOSE: 114 MG/DL
GLUCOSE SERPL-MCNC: 97 MG/DL
HBA1C MFR BLD HPLC: 5.6 %
HBV SURFACE AB SERPL IA-ACNC: >1000 MIU/ML
HCT VFR BLD CALC: 43.6 %
HDLC SERPL-MCNC: 55 MG/DL
HGB BLD-MCNC: 13.8 G/DL
IMM GRANULOCYTES NFR BLD AUTO: 0.3 %
LDLC SERPL CALC-MCNC: 198 MG/DL
LDLC SERPL DIRECT ASSAY-MCNC: 186 MG/DL
LYMPHOCYTES # BLD AUTO: 2.53 K/UL
LYMPHOCYTES NFR BLD AUTO: 32.2 %
M TB IFN-G BLD-IMP: NEGATIVE
MAN DIFF?: NORMAL
MCHC RBC-ENTMCNC: 29.6 PG
MCHC RBC-ENTMCNC: 31.7 GM/DL
MCV RBC AUTO: 93.6 FL
MEV IGG FLD QL IA: 52.9 AU/ML
MEV IGG+IGM SER-IMP: POSITIVE
MONOCYTES # BLD AUTO: 0.56 K/UL
MONOCYTES NFR BLD AUTO: 7.1 %
MUV AB SER-ACNC: POSITIVE
MUV IGG SER QL IA: 190 AU/ML
NEUTROPHILS # BLD AUTO: 4.63 K/UL
NEUTROPHILS NFR BLD AUTO: 58.9 %
NONHDLC SERPL-MCNC: 212 MG/DL
PHOSPHATE SERPL-MCNC: 4.4 MG/DL
PLATELET # BLD AUTO: 308 K/UL
POTASSIUM SERPL-SCNC: 4.1 MMOL/L
PROT SERPL-MCNC: 8.4 G/DL
QUANTIFERON TB PLUS MITOGEN MINUS NIL: 10 IU/ML
QUANTIFERON TB PLUS NIL: 0 IU/ML
QUANTIFERON TB PLUS TB1 MINUS NIL: 0 IU/ML
QUANTIFERON TB PLUS TB2 MINUS NIL: 0.02 IU/ML
RBC # BLD: 4.66 M/UL
RBC # FLD: 12.2 %
RUBV IGG FLD-ACNC: 4.9 INDEX
RUBV IGG SER-IMP: POSITIVE
SARS-COV-2 AB SERPL QL IA: 0.08 INDEX
SODIUM SERPL-SCNC: 139 MMOL/L
T3 SERPL-MCNC: 134 NG/DL
T3FREE SERPL-MCNC: 3.3 PG/ML
T3RU NFR SERPL: 1.2 TBI
T4 FREE SERPL-MCNC: 1.2 NG/DL
T4 SERPL-MCNC: 8.3 UG/DL
TRIGL SERPL-MCNC: 73 MG/DL
TSH SERPL-ACNC: 0.78 UIU/ML
VZV AB TITR SER: POSITIVE
VZV IGG SER IF-ACNC: 2311 INDEX
WBC # FLD AUTO: 7.86 K/UL

## 2022-02-20 ENCOUNTER — RESULT CHARGE (OUTPATIENT)
Age: 37
End: 2022-02-20

## 2022-03-07 ENCOUNTER — APPOINTMENT (OUTPATIENT)
Dept: INTERNAL MEDICINE | Facility: CLINIC | Age: 37
End: 2022-03-07

## 2022-03-16 ENCOUNTER — APPOINTMENT (OUTPATIENT)
Dept: INTERNAL MEDICINE | Facility: CLINIC | Age: 37
End: 2022-03-16
Payer: MEDICAID

## 2022-03-16 ENCOUNTER — NON-APPOINTMENT (OUTPATIENT)
Age: 37
End: 2022-03-16

## 2022-03-16 ENCOUNTER — APPOINTMENT (OUTPATIENT)
Dept: OBGYN | Facility: CLINIC | Age: 37
End: 2022-03-16
Payer: MEDICAID

## 2022-03-16 ENCOUNTER — LABORATORY RESULT (OUTPATIENT)
Age: 37
End: 2022-03-16

## 2022-03-16 VITALS
BODY MASS INDEX: 32.18 KG/M2 | SYSTOLIC BLOOD PRESSURE: 129 MMHG | HEIGHT: 67 IN | WEIGHT: 205 LBS | DIASTOLIC BLOOD PRESSURE: 86 MMHG | HEART RATE: 79 BPM

## 2022-03-16 VITALS
BODY MASS INDEX: 32.08 KG/M2 | WEIGHT: 202 LBS | SYSTOLIC BLOOD PRESSURE: 120 MMHG | DIASTOLIC BLOOD PRESSURE: 80 MMHG | TEMPERATURE: 98.3 F | HEIGHT: 66.5 IN

## 2022-03-16 DIAGNOSIS — B35.4 TINEA CORPORIS: ICD-10-CM

## 2022-03-16 PROCEDURE — 99395 PREV VISIT EST AGE 18-39: CPT | Mod: 25

## 2022-03-16 PROCEDURE — 93000 ELECTROCARDIOGRAM COMPLETE: CPT

## 2022-03-16 PROCEDURE — 99395 PREV VISIT EST AGE 18-39: CPT

## 2022-03-16 RX ORDER — COVID-19 TEST SPECIMEN COLLECT
MISCELLANEOUS MISCELLANEOUS
Qty: 1 | Refills: 0 | Status: COMPLETED | COMMUNITY
Start: 2022-02-10 | End: 2022-03-16

## 2022-03-16 RX ORDER — NIFEDIPINE 60 MG/1
60 TABLET, EXTENDED RELEASE ORAL DAILY
Qty: 90 | Refills: 1 | Status: COMPLETED | COMMUNITY
Start: 2022-02-02 | End: 2022-03-16

## 2022-03-16 RX ORDER — LABETALOL HYDROCHLORIDE 200 MG/1
200 TABLET, FILM COATED ORAL
Qty: 270 | Refills: 3 | Status: COMPLETED | COMMUNITY
Start: 2021-06-25 | End: 2022-03-16

## 2022-03-17 LAB
APPEARANCE: CLEAR
BILIRUBIN URINE: NEGATIVE
BLOOD URINE: NEGATIVE
COLOR: NORMAL
GLUCOSE QUALITATIVE U: NEGATIVE
KETONES URINE: NEGATIVE
LEUKOCYTE ESTERASE URINE: NEGATIVE
NITRITE URINE: NEGATIVE
PH URINE: 6.5
PROTEIN URINE: NEGATIVE
SPECIFIC GRAVITY URINE: 1.02
UROBILINOGEN URINE: NORMAL

## 2022-03-23 NOTE — HISTORY OF PRESENT ILLNESS
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  She is tolerating Tenoretic 100/25mg qd.\par she is due to f/u with her cardiologist Dr. Orantes next month4/2022.\par

## 2022-03-23 NOTE — HEALTH RISK ASSESSMENT
[Good] : ~his/her~  mood as  good [Never] : Never [Yes] : Yes [2 - 3 times a week (3 pts)] : 2 - 3  times a week (3 points) [Never (0 pts)] : Never (0 points) [No] : In the past 12 months have you used drugs other than those required for medical reasons? No [Patient reported PAP Smear was normal] : Patient reported PAP Smear was normal [HIV test declined] : HIV test declined [Hepatitis C test declined] : Hepatitis C test declined [Audit-CScore] : 3 [de-identified] : no routine exercise outside of walking with dog and stroller 20-30 minutes 3 days a week [de-identified] : recently "terribel" diet [PapSmearDate] : 03/21 [PapSmearComments] : with Dr. Parham today

## 2022-03-23 NOTE — PHYSICAL EXAM
[No Acute Distress] : no acute distress [Well Nourished] : well nourished [Well Developed] : well developed [Well-Appearing] : well-appearing [Normal Sclera/Conjunctiva] : normal sclera/conjunctiva [PERRL] : pupils equal round and reactive to light [EOMI] : extraocular movements intact [Normal Outer Ear/Nose] : the outer ears and nose were normal in appearance [No JVD] : no jugular venous distention [No Lymphadenopathy] : no lymphadenopathy [Supple] : supple [Thyroid Normal, No Nodules] : the thyroid was normal and there were no nodules present [No Respiratory Distress] : no respiratory distress  [No Accessory Muscle Use] : no accessory muscle use [Clear to Auscultation] : lungs were clear to auscultation bilaterally [Normal Rate] : normal rate  [Regular Rhythm] : with a regular rhythm [Normal S1, S2] : normal S1 and S2 [No Murmur] : no murmur heard [No Carotid Bruits] : no carotid bruits [No Abdominal Bruit] : a ~M bruit was not heard ~T in the abdomen [No Varicosities] : no varicosities [Pedal Pulses Present] : the pedal pulses are present [No Edema] : there was no peripheral edema [No Extremity Clubbing/Cyanosis] : no extremity clubbing/cyanosis [No Palpable Aorta] : no palpable aorta [Normal Appearance] : normal in appearance [No Axillary Lymphadenopathy] : no axillary lymphadenopathy [Soft] : abdomen soft [Non Tender] : non-tender [Non-distended] : non-distended [No Masses] : no abdominal mass palpated [No HSM] : no HSM [Normal Bowel Sounds] : normal bowel sounds [Normal Posterior Cervical Nodes] : no posterior cervical lymphadenopathy [Normal Anterior Cervical Nodes] : no anterior cervical lymphadenopathy [No CVA Tenderness] : no CVA  tenderness [No Spinal Tenderness] : no spinal tenderness [No Joint Swelling] : no joint swelling [Grossly Normal Strength/Tone] : grossly normal strength/tone [No Rash] : no rash [Coordination Grossly Intact] : coordination grossly intact [No Focal Deficits] : no focal deficits [Normal Gait] : normal gait [Deep Tendon Reflexes (DTR)] : deep tendon reflexes were 2+ and symmetric [Normal Affect] : the affect was normal [Normal Insight/Judgement] : insight and judgment were intact

## 2022-03-25 ENCOUNTER — NON-APPOINTMENT (OUTPATIENT)
Age: 37
End: 2022-03-25

## 2022-03-29 LAB
25(OH)D3 SERPL-MCNC: 13.5 NG/ML
C TRACH RRNA SPEC QL NAA+PROBE: NOT DETECTED
CYTOLOGY CVX/VAG DOC THIN PREP: ABNORMAL
HBV SURFACE AG SER QL: NONREACTIVE
HCV AB SER QL: NONREACTIVE
HCV S/CO RATIO: 0.2 S/CO
HIV1+2 AB SPEC QL IA.RAPID: NONREACTIVE
HPV HIGH+LOW RISK DNA PNL CVX: DETECTED
N GONORRHOEA RRNA SPEC QL NAA+PROBE: NOT DETECTED
SOURCE AMPLIFICATION: NORMAL
T PALLIDUM AB SER QL IA: NEGATIVE

## 2022-03-29 NOTE — PLAN
[FreeTextEntry1] : 35 y/o , LMP 3/9/22, annual exam\par \par HCM\par -pap/hpv done today\par -vit D/calcium/exercise\par -blood work for STI screening, serum Vitamin D\par -breast mammo/sono at age 38\par -f/u PCP for annual and appropriate immunizations\par -rto 1 year

## 2022-03-29 NOTE — END OF VISIT
[FreeTextEntry3] : I, Micaela Rivers, acted as a scribe on behalf for Dr. Lenora Parham on 03/16/2022.\par \par All medical entries made by the scribe were at my, Dr. Lenora Parham, direction and personally dictated by me on 03/16/2022. I have reviewed the chart and agree that the record accurately reflects my personal performance of the history, physical exam, assessment, and plan. I have personally directed, reviewed, and agreed with the chart.

## 2022-03-29 NOTE — HISTORY OF PRESENT ILLNESS
[FreeTextEntry1] : 37 y/o , LMP 3/9/22, presents today for annual exam. Pt currently has Mirena IUD in place and is happy with it. She has some random spotting. She offers no complaints. \par \par ObHx: C/S 12/10/2020- female 5.14, PEC \par PMHx: Chronic HTN

## 2022-04-25 ENCOUNTER — APPOINTMENT (OUTPATIENT)
Dept: CARDIOLOGY | Facility: CLINIC | Age: 37
End: 2022-04-25
Payer: MEDICAID

## 2022-04-25 VITALS
WEIGHT: 215 LBS | DIASTOLIC BLOOD PRESSURE: 80 MMHG | SYSTOLIC BLOOD PRESSURE: 120 MMHG | OXYGEN SATURATION: 99 % | HEIGHT: 67 IN | HEART RATE: 71 BPM | BODY MASS INDEX: 33.74 KG/M2

## 2022-04-25 DIAGNOSIS — E78.00 PURE HYPERCHOLESTEROLEMIA, UNSPECIFIED: ICD-10-CM

## 2022-04-25 PROCEDURE — 99214 OFFICE O/P EST MOD 30 MIN: CPT

## 2022-04-25 PROCEDURE — 93000 ELECTROCARDIOGRAM COMPLETE: CPT

## 2022-04-25 NOTE — ASSESSMENT
[FreeTextEntry1] : 35 year old female with chronic hypertension and a recent pregnancy, delivered in December 2020 with complicating preeclampsia returns for follow up. \par \par # Chronic HTN : Continue Atenolol - Chlorthalidone 100- 25 mg QD \par BP Stable \par Encouraged Patient to monitor BP at home and keep a log and report results back to us for evaluation. Based on results, we will adjust medications as necessary. \par Additionally, encouraged heart healthy diet and exercise as tolerated.\par EKG with no acute changes.\par \par \par # HLD : 2/17/22 - TG 73, , HDL 55, \par Repeat Lipid panle and chem \par Encouraged health eating, Mediterranean diet\par Encouraged exercise 150 minutes per week\par Patient has requested to connect with nutritionist, Ginny Brumfield \par Will repeat lipid profile in 6 months \par \par FU in 6 months \par WIll discus results over  phone.\par \par

## 2022-04-25 NOTE — CARDIOLOGY SUMMARY
[Normal] : normal [___] : [unfilled] [LVEF ___%] : LVEF [unfilled]% [de-identified] : Recent echocardiogram performed on February 12, 2021 demonstrated:\par Normal LV systolic function\par No segmental wall motion abnormalities\par NOrmal diastolic function\par Normal RV size and function\par MItral annular calcification.\par No MR

## 2022-05-05 ENCOUNTER — APPOINTMENT (OUTPATIENT)
Dept: OBGYN | Facility: CLINIC | Age: 37
End: 2022-05-05
Payer: MEDICAID

## 2022-05-05 VITALS
BODY MASS INDEX: 33.74 KG/M2 | HEIGHT: 67 IN | SYSTOLIC BLOOD PRESSURE: 137 MMHG | DIASTOLIC BLOOD PRESSURE: 84 MMHG | WEIGHT: 215 LBS

## 2022-05-05 DIAGNOSIS — R87.612 LOW GRADE SQUAMOUS INTRAEPITHELIAL LESION ON CYTOLOGIC SMEAR OF CERVIX (LGSIL): ICD-10-CM

## 2022-05-05 PROCEDURE — 57454 BX/CURETT OF CERVIX W/SCOPE: CPT

## 2022-05-06 ENCOUNTER — NON-APPOINTMENT (OUTPATIENT)
Age: 37
End: 2022-05-06

## 2022-05-06 DIAGNOSIS — Z71.2 PERSON CONSULTING FOR EXPLANATION OF EXAMINATION OR TEST FINDINGS: ICD-10-CM

## 2022-05-06 LAB
ALBUMIN SERPL ELPH-MCNC: 5 G/DL
ALP BLD-CCNC: 86 U/L
ALT SERPL-CCNC: 42 U/L
ANION GAP SERPL CALC-SCNC: 14 MMOL/L
AST SERPL-CCNC: 26 U/L
BILIRUB SERPL-MCNC: 0.6 MG/DL
BUN SERPL-MCNC: 13 MG/DL
CALCIUM SERPL-MCNC: 10.5 MG/DL
CHLORIDE SERPL-SCNC: 99 MMOL/L
CHOLEST SERPL-MCNC: 237 MG/DL
CO2 SERPL-SCNC: 26 MMOL/L
CREAT SERPL-MCNC: 0.65 MG/DL
EGFR: 116 ML/MIN/1.73M2
GLUCOSE SERPL-MCNC: 102 MG/DL
HDLC SERPL-MCNC: 39 MG/DL
LDLC SERPL CALC-MCNC: 178 MG/DL
NONHDLC SERPL-MCNC: 197 MG/DL
POTASSIUM SERPL-SCNC: 4.3 MMOL/L
PROT SERPL-MCNC: 7.5 G/DL
SODIUM SERPL-SCNC: 139 MMOL/L
TRIGL SERPL-MCNC: 97 MG/DL

## 2022-05-09 PROBLEM — R87.612 LGSIL ON PAP SMEAR OF CERVIX: Status: ACTIVE | Noted: 2022-05-05

## 2022-05-09 NOTE — PLAN
[FreeTextEntry1] : 37 y/o , LMP 22, colposcopy. \par \par -counseled that discharge will last for approx a week \par -biopsies taken, f/u with results in 1 week

## 2022-05-09 NOTE — END OF VISIT
[FreeTextEntry3] : I, Unique Michael, acted as a scribe on behalf of Dr. Lenora Parham on 05/05/2022. \par \par All medical entries made by the scribe where at my, Dr. Lenora Parham, direction and personally dictated by me on 05/05/2022. I have reviewed the chart and agree that the record accurately reflects my personal performance of the history, physical exam, assessment, and plan. I have also personally directed, reviewed and agreed with the chart.\par

## 2022-05-09 NOTE — PROCEDURE
[Colposcopy] : Colposcopy  [Time out performed] : Pre-procedure time out performed.  Patient's name, date of birth and procedure confirmed. [Consent Obtained] : Consent obtained [Risks] : risks [Benefits] : benefits [Alternatives] : alternatives [Patient] : patient [Infection] : infection [Bleeding] : bleeding [Allergic Reaction] : allergic reaction [ECC Performed] : ECC performed [Biopsy] : biopsy taken [Hemostasis Obtained] : Hemostasis obtained [Tolerated Well] : the patient tolerated the procedure well [de-identified] : LSIL [de-identified] : 3 [de-identified] : ECC,12 o'clock, 6 o'clock [de-identified] : Advil given

## 2022-05-09 NOTE — ASSESSMENT
[FreeTextEntry1] : 35 y/o , LMP 22, presents for colposcopy. Pap done 3/16/22 revealed LSIL. \par \par ObHx: C/S 12/10/2020- female 5.14, PEC \par PMHx: Chronic HTN \par

## 2022-06-06 ENCOUNTER — NON-APPOINTMENT (OUTPATIENT)
Age: 37
End: 2022-06-06

## 2022-07-11 ENCOUNTER — APPOINTMENT (OUTPATIENT)
Dept: INTERNAL MEDICINE | Facility: CLINIC | Age: 37
End: 2022-07-11

## 2022-07-11 ENCOUNTER — NON-APPOINTMENT (OUTPATIENT)
Age: 37
End: 2022-07-11

## 2022-07-11 VITALS
TEMPERATURE: 97.8 F | WEIGHT: 212 LBS | SYSTOLIC BLOOD PRESSURE: 140 MMHG | DIASTOLIC BLOOD PRESSURE: 80 MMHG | BODY MASS INDEX: 33.27 KG/M2 | HEIGHT: 67 IN

## 2022-07-11 DIAGNOSIS — R10.9 UNSPECIFIED ABDOMINAL PAIN: ICD-10-CM

## 2022-07-11 DIAGNOSIS — R14.0 ABDOMINAL DISTENSION (GASEOUS): ICD-10-CM

## 2022-07-11 PROCEDURE — 99213 OFFICE O/P EST LOW 20 MIN: CPT

## 2022-07-11 RX ORDER — BENZONATATE 100 MG/1
100 CAPSULE ORAL
Qty: 21 | Refills: 0 | Status: COMPLETED | COMMUNITY
Start: 2022-06-07

## 2022-07-13 LAB
APPEARANCE: CLEAR
BACTERIA UR CULT: NORMAL
BACTERIA: NEGATIVE
BILIRUBIN URINE: NEGATIVE
BLOOD URINE: NEGATIVE
COLOR: YELLOW
GLUCOSE QUALITATIVE U: NEGATIVE
HYALINE CASTS: 1 /LPF
KETONES URINE: NEGATIVE
LEUKOCYTE ESTERASE URINE: NEGATIVE
MICROSCOPIC-UA: NORMAL
NITRITE URINE: NEGATIVE
PH URINE: 6
PROTEIN URINE: NORMAL
RED BLOOD CELLS URINE: 4 /HPF
SPECIFIC GRAVITY URINE: 1.03
SQUAMOUS EPITHELIAL CELLS: 3 /HPF
UROBILINOGEN URINE: NORMAL
WHITE BLOOD CELLS URINE: 1 /HPF

## 2022-07-16 PROBLEM — R14.0 ABDOMINAL BLOATING: Status: ACTIVE | Noted: 2021-08-23

## 2022-07-16 PROBLEM — R10.9 ABDOMINAL PAIN: Status: ACTIVE | Noted: 2022-07-11

## 2022-07-16 NOTE — HISTORY OF PRESENT ILLNESS
[FreeTextEntry8] : pt presents with ~1 week h/o worsening dysuria and lower abdominal cramps and bloating/discomfort.\par No fevers or chills or any change in bowel habits.\par \par Pt also presents for f/u for hypertension.

## 2022-07-20 ENCOUNTER — OUTPATIENT (OUTPATIENT)
Dept: OUTPATIENT SERVICES | Facility: HOSPITAL | Age: 37
LOS: 1 days | End: 2022-07-20
Payer: MEDICAID

## 2022-07-20 ENCOUNTER — APPOINTMENT (OUTPATIENT)
Dept: ULTRASOUND IMAGING | Facility: CLINIC | Age: 37
End: 2022-07-20

## 2022-07-20 DIAGNOSIS — R14.0 ABDOMINAL DISTENSION (GASEOUS): ICD-10-CM

## 2022-07-20 DIAGNOSIS — R10.9 UNSPECIFIED ABDOMINAL PAIN: ICD-10-CM

## 2022-07-20 DIAGNOSIS — K08.491 PARTIAL LOSS OF TEETH DUE TO OTHER SPECIFIED CAUSE, CLASS I: Chronic | ICD-10-CM

## 2022-07-20 PROCEDURE — 76700 US EXAM ABDOM COMPLETE: CPT

## 2022-07-20 PROCEDURE — 76700 US EXAM ABDOM COMPLETE: CPT | Mod: 26

## 2022-09-21 ENCOUNTER — APPOINTMENT (OUTPATIENT)
Dept: OBGYN | Facility: CLINIC | Age: 37
End: 2022-09-21

## 2022-09-21 VITALS
WEIGHT: 220 LBS | HEIGHT: 67 IN | BODY MASS INDEX: 34.53 KG/M2 | DIASTOLIC BLOOD PRESSURE: 85 MMHG | HEART RATE: 73 BPM | SYSTOLIC BLOOD PRESSURE: 134 MMHG

## 2022-09-21 DIAGNOSIS — B37.9 CANDIDIASIS, UNSPECIFIED: ICD-10-CM

## 2022-09-21 DIAGNOSIS — R10.2 PELVIC AND PERINEAL PAIN: ICD-10-CM

## 2022-09-21 PROCEDURE — 99213 OFFICE O/P EST LOW 20 MIN: CPT

## 2022-09-26 ENCOUNTER — TRANSCRIPTION ENCOUNTER (OUTPATIENT)
Age: 37
End: 2022-09-26

## 2022-09-27 PROBLEM — B37.9 YEAST INFECTION: Status: ACTIVE | Noted: 2022-09-27

## 2022-09-27 LAB
BACTERIA UR CULT: NORMAL
CANDIDA VAG CYTO: DETECTED
G VAGINALIS+PREV SP MTYP VAG QL MICRO: NOT DETECTED
T VAGINALIS VAG QL WET PREP: NOT DETECTED

## 2022-09-27 NOTE — PLAN
[FreeTextEntry1] : 36 yo , abdominal bloating, frequent urination, vaginal sxs.\par \par Abdominal bloating\par -recommended pt start daily probiotics\par -discussed option of weight loss medications (Ozempic, Trulicity), weight loss IV drips\par -referral given for weight management, Dr. Martell \par \par Frequent urination / vaginal sxs\par -continue with IUD, discussed other BC options\par -BD affirm, UCX today\par -discussed options of kegels, pelvic floor PT\par -info booklet given for kegels \par -referral given for pelvic floor PT

## 2022-09-27 NOTE — END OF VISIT
[FreeTextEntry3] : I, Uniquekimberly Rodriguez, acted as a scribe on behalf of Dr. Lenora Parham on 09/21/2022. \par \par All medical entries made by the scribe where at my, Dr. Lenora Parham, direction and personally dictated by me on 09/21/2022. I have reviewed the chart and agree that the record accurately reflects my personal performance of the history, physical exam, assessment, and plan. I have also personally directed, reviewed and agreed with the chart.\par

## 2022-10-10 ENCOUNTER — MED ADMIN CHARGE (OUTPATIENT)
Age: 37
End: 2022-10-10

## 2022-10-10 DIAGNOSIS — Z23 ENCOUNTER FOR IMMUNIZATION: ICD-10-CM

## 2022-10-26 ENCOUNTER — APPOINTMENT (OUTPATIENT)
Dept: CARDIOLOGY | Facility: CLINIC | Age: 37
End: 2022-10-26

## 2022-10-26 VITALS
HEIGHT: 67 IN | BODY MASS INDEX: 34.53 KG/M2 | OXYGEN SATURATION: 98 % | HEART RATE: 71 BPM | SYSTOLIC BLOOD PRESSURE: 120 MMHG | DIASTOLIC BLOOD PRESSURE: 78 MMHG | WEIGHT: 220 LBS

## 2022-10-26 PROCEDURE — 99214 OFFICE O/P EST MOD 30 MIN: CPT | Mod: 25

## 2022-10-26 PROCEDURE — 93000 ELECTROCARDIOGRAM COMPLETE: CPT

## 2022-10-26 NOTE — CARDIOLOGY SUMMARY
[Normal] : normal [___] : [unfilled] [LVEF ___%] : LVEF [unfilled]% [de-identified] : Recent echocardiogram performed on February 12, 2021 demonstrated:\par Normal LV systolic function\par No segmental wall motion abnormalities\par NOrmal diastolic function\par Normal RV size and function\par MItral annular calcification.\par No MR

## 2022-10-26 NOTE — HISTORY OF PRESENT ILLNESS
[FreeTextEntry1] : Taryn Hale is a 37 year old  with a history of  chronic hypertension diagnosed in  2016 (on propranolol and HCTZ) presents in for follow up. She gave birth to her first child on December 20, 2020-> delivered with preeclampsia, treated with IV Magnesium and discharged home on Labetalol 300 mg TID and Procardia 60 mg daily.  \par BP log 106-134/71-86\par Blood work - , , HDL 38, \par Stress test - normal \par \par Recent echocardiogram performed on February 12, 2021 demonstrated:\par Normal LV systolic function\par No segmental wall motion abnormalities\par NOrmal diastolic function\par Normal RV size and function\par MItral annular calcification.\par No MR\par \par Denies any issues with shortness of breath, palpitations or chest discomfort. She has not been exercising during these cold months inside.

## 2022-10-26 NOTE — DISCUSSION/SUMMARY
[EKG obtained to assist in diagnosis and management of assessed problem(s)] : EKG obtained to assist in diagnosis and management of assessed problem(s) [FreeTextEntry1] : Taryn Hale is a 37 year old  with a history of  chronic hypertension diagnosed in  2016 (on propranolol and HCTZ) presents in for follow up. She gave birth to her first child on December 20, 2020-> delivered with preeclampsia, treated with IV Magnesium and discharged home on Labetalol 300 mg TID and Procardia 60 mg daily.  \par BP log 106-134/71-86\par Blood work - , , HDL 38, \par Stress test - normal \par \par Recent echocardiogram performed on February 12, 2021 demonstrated:\par Normal LV systolic function\par No segmental wall motion abnormalities\par NOrmal diastolic function\par Normal RV size and function\par MItral annular calcification.\par No MR\par \par Denies any issues with shortness of breath, palpitations or chest discomfort. She has not been exercising during these cold months inside. \par \par \par # Chronic HTN : Continue Atenolol - Chlorthalidone 100- 25 mg QD \par BP Stable \par Encouraged Patient to monitor BP at home and keep a log and report results back to us for evaluation. Based on results, we will adjust medications as necessary. \par Additionally, encouraged heart healthy diet and exercise as tolerated.\par EKG with no acute changes.\par \par \par # HLD : 2/17/22 - TG 73, , HDL 55, \par Repeat Lipid panle and chem \par Encouraged health eating, Mediterranean diet\par Encouraged exercise 150 minutes per week\par Patient has requested to connect with nutritionist, Ginny Brumfield \par Will repeat lipid profile in 6 months \par \par FU in 6 months

## 2023-01-11 ENCOUNTER — APPOINTMENT (OUTPATIENT)
Dept: INTERNAL MEDICINE | Facility: CLINIC | Age: 38
End: 2023-01-11
Payer: COMMERCIAL

## 2023-01-11 VITALS
TEMPERATURE: 97.6 F | SYSTOLIC BLOOD PRESSURE: 130 MMHG | BODY MASS INDEX: 33.9 KG/M2 | HEIGHT: 67 IN | DIASTOLIC BLOOD PRESSURE: 70 MMHG | WEIGHT: 216 LBS

## 2023-01-11 DIAGNOSIS — F41.9 ANXIETY DISORDER, UNSPECIFIED: ICD-10-CM

## 2023-01-11 DIAGNOSIS — M79.2 NEURALGIA AND NEURITIS, UNSPECIFIED: ICD-10-CM

## 2023-01-11 DIAGNOSIS — R51.9 HEADACHE, UNSPECIFIED: ICD-10-CM

## 2023-01-11 PROCEDURE — 99214 OFFICE O/P EST MOD 30 MIN: CPT | Mod: 25

## 2023-01-11 PROCEDURE — 36415 COLL VENOUS BLD VENIPUNCTURE: CPT

## 2023-01-12 LAB
ALBUMIN SERPL ELPH-MCNC: 4.8 G/DL
ALP BLD-CCNC: 93 U/L
ALT SERPL-CCNC: 30 U/L
ANION GAP SERPL CALC-SCNC: 13 MMOL/L
AST SERPL-CCNC: 23 U/L
BASOPHILS # BLD AUTO: 0.05 K/UL
BASOPHILS NFR BLD AUTO: 0.7 %
BILIRUB SERPL-MCNC: 0.2 MG/DL
BUN SERPL-MCNC: 14 MG/DL
CALCIUM SERPL-MCNC: 10.9 MG/DL
CHLORIDE SERPL-SCNC: 99 MMOL/L
CO2 SERPL-SCNC: 26 MMOL/L
CREAT SERPL-MCNC: 0.69 MG/DL
CRP SERPL-MCNC: <3 MG/L
EGFR: 113 ML/MIN/1.73M2
EOSINOPHIL # BLD AUTO: 0.07 K/UL
EOSINOPHIL NFR BLD AUTO: 0.9 %
ERYTHROCYTE [SEDIMENTATION RATE] IN BLOOD BY WESTERGREN METHOD: 31 MM/HR
ESTIMATED AVERAGE GLUCOSE: 123 MG/DL
GLUCOSE SERPL-MCNC: 89 MG/DL
HBA1C MFR BLD HPLC: 5.9 %
HCT VFR BLD CALC: 47.4 %
HGB BLD-MCNC: 15.1 G/DL
IMM GRANULOCYTES NFR BLD AUTO: 0.3 %
LYMPHOCYTES # BLD AUTO: 2.68 K/UL
LYMPHOCYTES NFR BLD AUTO: 35.7 %
MAN DIFF?: NORMAL
MCHC RBC-ENTMCNC: 30.4 PG
MCHC RBC-ENTMCNC: 31.9 GM/DL
MCV RBC AUTO: 95.6 FL
MONOCYTES # BLD AUTO: 0.64 K/UL
MONOCYTES NFR BLD AUTO: 8.5 %
NEUTROPHILS # BLD AUTO: 4.04 K/UL
NEUTROPHILS NFR BLD AUTO: 53.9 %
PLATELET # BLD AUTO: 306 K/UL
POTASSIUM SERPL-SCNC: 3.9 MMOL/L
PROT SERPL-MCNC: 8.4 G/DL
RBC # BLD: 4.96 M/UL
RBC # FLD: 12.4 %
SODIUM SERPL-SCNC: 137 MMOL/L
TSH SERPL-ACNC: 0.79 UIU/ML
VIT B12 SERPL-MCNC: 550 PG/ML
WBC # FLD AUTO: 7.5 K/UL

## 2023-01-12 RX ORDER — FLUCONAZOLE 150 MG/1
150 TABLET ORAL
Qty: 2 | Refills: 1 | Status: COMPLETED | COMMUNITY
Start: 2022-09-27 | End: 2023-01-12

## 2023-01-12 RX ORDER — KETOCONAZOLE 20 MG/G
2 CREAM TOPICAL TWICE DAILY
Qty: 1 | Refills: 3 | Status: COMPLETED | COMMUNITY
Start: 2022-03-16 | End: 2023-01-12

## 2023-01-27 ENCOUNTER — APPOINTMENT (OUTPATIENT)
Dept: BARIATRICS/WEIGHT MGMT | Facility: CLINIC | Age: 38
End: 2023-01-27
Payer: COMMERCIAL

## 2023-01-27 PROCEDURE — 99205 OFFICE O/P NEW HI 60 MIN: CPT | Mod: 95

## 2023-01-28 NOTE — ASSESSMENT
[FreeTextEntry1] : BARIATRIC SURGERY HISTORY: none\par \par OBESITY COMORBIDITIES: HTN, HLD, pre-dm, met-s\par \par ANTI-OBESITY MEDICATIONS: none\par \par OBESITY MEDICATION SIDE EFFECTS: n/a\par \par \par Recommend the following:\par reviewed metabolic labs\par whole foods based eating strategy limiting refined carbs and added fats - recommend plant forward\par keep food journal\par track daily activity\par recommended minimum of 6 hours/sleep per night\par work with NP on intensive lifestyle modification\par \par rtc in 4 weeks.\par

## 2023-01-28 NOTE — REASON FOR VISIT
[Home] : at home, [unfilled] , at the time of the visit. [Other Location: e.g. Home (Enter Location, City,State)___] : at [unfilled] [Patient] : the patient [Initial Evaluation] : an initial evaluation [FreeTextEntry1] : obesity

## 2023-01-28 NOTE — HISTORY OF PRESENT ILLNESS
[FreeTextEntry1] : 38 yo woman with obesity, pre-dm, HTN, HLD (metabolic syndrome) presents for initial obesity medicine eval.\par \par current weight = 216 lbs (max lifetime)\par height = 5'7\par \par has tried weight watchers in the past back when she was in college - liked meetings for accountability.  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\par currently living back with parents \par daughter \par \par FOOD:\par pretty regularly skips breakfast\par comes home for lunch around 11:45 - leftovers from night before - usually the big meal - hungry at that time\par has a snack when she comes home - usually stuff like chips/pretzels\par either she or her mother cooks dinner, cooks with a lot of salt\par \par PHYSICAL ACTIVITY:\par time constraints and fatigue get in the way of exercise\par \par SLEEP:\par reports sleep is terrible\par 4-5 hours per night goes to bed late\par sometimes gets up in middle of night other time sleeps through\par \par STRESS:\par no additional stressors other than work, time sensitivity\par \par Please refer to intake forms for complete weight and related history.\par

## 2023-02-17 NOTE — OB RN PATIENT PROFILE - PAIN SCALE PREFERRED, PROFILE
Patient: Karma Bland Date: 2023   : 1965 Attending: Jeff Kennedy MD   57 year old female      Primary Rehab Diagnosis: Primary Rehabilitation Diagnosis: NTBI-SDH  Expected Discharge Date: 2023  Expected Discharge Time:      Subjective: Mrs. Bland denies any new symptom this am and had a stable night. No concerns from nursing to address.     Reviewed: Allergies and Medications      Vital Last Value 24 Hour Range   Temperature 98.1 °F (36.7 °C) (23) Temp  Min: 98.1 °F (36.7 °C)  Max: 98.1 °F (36.7 °C)   Pulse 64 (23) Pulse  Min: 64  Max: 68   Respiratory 18 (23) Resp  Min: 18  Max: 18   Non-Invasive  Blood Pressure 120/84 (23) BP  Min: 120/84  Max: 148/70   Pulse Oximetry 97 % (23) SpO2  Min: 97 %  Max: 97 %     Vital Today Admit   Weight 130.8 kg (288 lb 5.8 oz) (23) Weight: 128 kg (282 lb 3.2 oz) (23)   Height N/A Height: 5' 5\" (165.1 cm) (23)   BMI N/A BMI (Calculated): 46.96 (23)     Weight over the past 48 Hours:  Patient Vitals for the past 48 hrs:   Weight   23 128 kg (282 lb 3.2 oz)   23 130.8 kg (288 lb 5.8 oz)        Intake/Output:    Last Stool Occurrence: 1 (23 1725)    No intake/output data recorded.    I/O last 3 completed shifts:  In: 300 [P.O.:300]  Out: -       Intake/Output Summary (Last 24 hours) at 2023 1342  Last data filed at 2023 1423  Gross per 24 hour   Intake 300 ml   Output --   Net 300 ml       Central Line: No    [place]: No    Physical Exam:   GENERAL:  The patient is well developed, well nourished, in no acute distress sitting in a recliner.   HEENT:  Atraumatic, normocephalic, sclerae anicteric, conjunctivae not injected, oral mucous membranes are pink and moist.  Left frontoparietal craniotomy incision is clean, dry and intact with intact sutures.  NECK:  Supple, trachea midline.  CHEST:  Clear to auscultation bilaterally,  normal respiratory rate.  CARDIOVASCULAR:  Regular rate and rhythm, normal S1, S2.  ABDOMEN:  Soft, nontender, nondistended, negative for hepatosplenomegaly and positive bowel sounds in all quadrants of the abdomen.   EXTREMITIES:  Without clubbing, cyanosis, or edema.  MUSCULOSKELETAL:  No gross appendicular deformities.     NEUROLOGIC:  Patient is alert and oriented to person, place and time.  Patient with mild dysarthria with word findings deficits  Cranial nerves II-XII are intact.    Motor.Strength is 5/5 in left upper and lower extremity, 5-/5 in right upper and lower extremity. Tone was assessed and is normal in upper and lower extremities.    Sensory.Sensation is intact to light touch in upper and lower extremities.  Coordination.Coordination tested with finger nose finger testing demonstrated no dysmetria.   Gait.Impaired with impaired static and dynamic standing balances.      Laboratory Results:    Lab Results   Component Value Date    SODIUM 138 02/15/2023    POTASSIUM 3.4 02/15/2023    CHLORIDE 107 02/15/2023    CO2 28 02/15/2023    CALCIUM 9.5 02/15/2023    BUN 19 02/15/2023    CREATININE 0.89 02/15/2023    MG 2.3 02/15/2023    INR 1.0 02/13/2023    PT 10.1 02/13/2023    WBC 9.3 02/13/2023    HCT 36.9 02/13/2023    HGB 12.4 02/13/2023     02/13/2023    TSH 3.551 02/15/2023    ALBUMIN 3.8 10/07/2022    GFRA 76 10/15/2018    GFRNA 66 10/15/2018    GLUCOSE 105 (H) 02/15/2023     Imaging:     Other:     Current Functional status over the last 24 hours:    Supine - Sit     Supine to sit  stand by assist    Sit to supine  stand by assist          Transfers     Sit to stand  contact guard/touching/steadying assist    Stand to sit  contact guard/touching/steadying assist    Stand pivot  minimal assist          Gait and Wheelchair     Ambulation device  gait belt; two-wheeled walker; 1 person    Distance 1at trial  140    Assist level  minimal assist          Stairs     Number of stairs, trial 1 (ascend  and desend together)  4  6 inch    Device  gait belt    Rails  bilateral rails    Assist level  with verbal cues; with tactile cues; minimal assist          Self Cares     Grooming  stand by assist    Footwear  stand by assist    Toileting  stand by assist    Toilet transfer  contact guard/touching/steadying assist          Feeding     None          Com/Cog     None        Impressions/Plan/DC Plan:  Left subdural hematoma with status post left frontoparietal craniotomy for evacuation. Mrs. Bland will be continued with acute inpatient rehabilitation..  The therapist documentation in epic noted.  Rehabilitation Team Conference note was reviewed.  The tentative discharge is set for 02/23/2023.   instruction close to the discharge.     Dysarthria with word-finding deficits.  Patient will be followed by speech therapist to improve the cognition and communication, provision of the compensatory techniques and completion the caregiver instruction.  Patient was able to communicate wants and needs 100% of the time.       Dizziness.  Patient with ongoing pain symptoms and will be continue with therapy process to manage the symptoms decreased function.  Patient with ongoing improvement in symptoms.     Right hemiparesis.  Patient with mild right-sided weakness with to continue with therapy process to improve the motor recovery functional use of right right leg.  Patient with ongoing motor recovery.  The     Gait impairment.Patient continue with therapy process improve the gait pattern and ambulatory distance.     Postoperative headaches.  Patient with ongoing improvement in headaches continued with p.r.n. use of and response will monitored.     Anxiety/depression.  Patient with ongoing counseling and support.  Patient demonstrated ongoing improvement coping skills.     Essential hypertension.  Patient with adequate blood pressure control and will be continued with Procardia XL 30 mg p.o. daily, losartan 100 mg p.o.  daily, hydrochlorothiazide 20 mg p.o. daily, Cardura foot 4 mg p.o. daily Coreg 6.25 mg p.o. b.i.d. and will closely.     Obstructive sleep apnea.  Patient continued on CPAP as tolerated.     Nonsustained ventricular tachycardia.  Patient continued Coreg 6 point mg b.i.d. and the symptoms.  Patient getting a 48 hour Holter monitor at discharge.     Hyperthyroidism.  Patient continued with Methimazole 2.5 mg po daily.     Patient is participating actively with the Rehabilitation Team and is making progress.   Rehab team's documentation reviewed with current status noted above. See team conference reports for specific discharge plans.     I have considered how current medical status and co-morbidities are impacting progress towards goals. Presently, the patient's medical co-morbidities aremaximized and are not inhibiting therapy progress with the medical plan as noted. The patient has continued functional deficits requiring inpatient rehabilitation. Continue intensive rehab, medical supervision , nursing cares and comprehensive discharge planning.     Jeff Kennedy MD   Diplomate American Board of PM & R.   Diplomate American Board of Brain Injury Medicine.       numerical 0-10

## 2023-02-21 NOTE — HISTORY OF PRESENT ILLNESS
[FreeTextEntry8] : Patient presents to discuss recent increased weight and inability to lose weight despite diet and exercise.  She has persistent hypertension with labile blood pressure readings.  She also notes recent increased anxiety symptoms.

## 2023-02-28 ENCOUNTER — APPOINTMENT (OUTPATIENT)
Dept: BARIATRICS/WEIGHT MGMT | Facility: CLINIC | Age: 38
End: 2023-02-28
Payer: COMMERCIAL

## 2023-02-28 VITALS
DIASTOLIC BLOOD PRESSURE: 70 MMHG | OXYGEN SATURATION: 97 % | HEART RATE: 81 BPM | SYSTOLIC BLOOD PRESSURE: 130 MMHG | HEIGHT: 67 IN | WEIGHT: 202.38 LBS | BODY MASS INDEX: 31.76 KG/M2

## 2023-02-28 PROCEDURE — 99213 OFFICE O/P EST LOW 20 MIN: CPT

## 2023-02-28 NOTE — HISTORY OF PRESENT ILLNESS
[FreeTextEntry1] : 36 yo woman with obesity, pre-dm, HTN, HLD (metabolic syndrome) presents for initial obesity medicine eval.\par \par current weight = 202 lbs (down 14 lbs from last month - 18 lbs from lifetime max of 10/2022)\par height = 5'7\par \par tolerating mounjaro without incident\par appetite and cravings have decreased\par eating a lot more salads and produce\par working out once per week at gym class now\par sleep still unchanged - a few longer nights but worked related stress limiting to 4-5 hours otherwise\par \par happy with progress to date

## 2023-02-28 NOTE — ASSESSMENT
[FreeTextEntry1] : BARIATRIC SURGERY HISTORY: none\par \par OBESITY COMORBIDITIES: HTN, HLD, pre-dm, met-s\par \par ANTI-OBESITY MEDICATIONS: none\par \par OBESITY MEDICATION SIDE EFFECTS: n/a\par \par \par Recommend the following:\par \par cont whole foods based eating strategy limiting refined carbs and added fats - plant forward\par cont advancing physical activity\par recommended minimum of 6 hours/sleep per night - will work on stress mgt\par cont mounjaro 2.5 mg for next month given profound results of first month\par \par rtc in 4 weeks.\par

## 2023-03-21 ENCOUNTER — TRANSCRIPTION ENCOUNTER (OUTPATIENT)
Age: 38
End: 2023-03-21

## 2023-03-22 ENCOUNTER — RX RENEWAL (OUTPATIENT)
Age: 38
End: 2023-03-22

## 2023-03-22 RX ORDER — NAPROXEN 500 MG/1
500 TABLET ORAL
Qty: 60 | Refills: 1 | Status: ACTIVE | COMMUNITY
Start: 2023-01-12 | End: 1900-01-01

## 2023-03-23 ENCOUNTER — APPOINTMENT (OUTPATIENT)
Dept: OBGYN | Facility: CLINIC | Age: 38
End: 2023-03-23
Payer: COMMERCIAL

## 2023-03-23 VITALS — DIASTOLIC BLOOD PRESSURE: 80 MMHG | WEIGHT: 202 LBS | SYSTOLIC BLOOD PRESSURE: 124 MMHG | BODY MASS INDEX: 31.64 KG/M2

## 2023-03-23 DIAGNOSIS — Z11.51 ENCOUNTER FOR SCREENING FOR HUMAN PAPILLOMAVIRUS (HPV): ICD-10-CM

## 2023-03-23 PROCEDURE — 99395 PREV VISIT EST AGE 18-39: CPT

## 2023-03-27 LAB
C TRACH RRNA SPEC QL NAA+PROBE: NOT DETECTED
HCV AB SER QL: NONREACTIVE
HCV S/CO RATIO: 0.12 S/CO
HIV1+2 AB SPEC QL IA.RAPID: NONREACTIVE
HPV HIGH+LOW RISK DNA PNL CVX: NOT DETECTED
N GONORRHOEA RRNA SPEC QL NAA+PROBE: NOT DETECTED
SOURCE TP AMPLIFICATION: NORMAL
T PALLIDUM AB SER QL IA: NEGATIVE

## 2023-03-27 NOTE — HISTORY OF PRESENT ILLNESS
[Patient reported PAP Smear was abnormal] : Patient reported PAP Smear was abnormal [FreeTextEntry1] : 36 yo female pt   present for an annual wellness exam. The pt has a Mirena IUD in place. The pt reports that her bloating is not as bad as it was during her last appointment. She is currently seeing a weight  now, and she feels like it's been helping (Dr. Londono). The pt also feels as though her urination symptoms from her last appointment have dissipated. She no longer feels like she can't fully empty her period. The pt did experience spotting last week. \par \par The pt reports a lumpy feeling in her breast, specifically around her nipple area. \par \par OBHx: C/S 12/10/20 \par PMHx: Chronic HTN\par  [PapSmeardate] : 03/22

## 2023-03-27 NOTE — PLAN
[FreeTextEntry1] : 38 yo female pt present for an annual wellness exam.\par \par HCM\par -pap done today \par -GC/CT done today\par -HIV/Syphilis/ Hepatitis B&C testing done today\par -rx for baseline mammo/sono\par -f/u PCP for annual and appropriate immunizations\par -rto 1 year

## 2023-03-27 NOTE — PHYSICAL EXAM
[Appropriately responsive] : appropriately responsive [Alert] : alert [No Acute Distress] : no acute distress [No Lymphadenopathy] : no lymphadenopathy [Soft] : soft [Non-tender] : non-tender [Non-distended] : non-distended [No HSM] : No HSM [No Lesions] : no lesions [No Mass] : no mass [Oriented x3] : oriented x3 [Examination Of The Breasts] : a normal appearance [No Masses] : no breast masses were palpable [Labia Majora] : normal [Labia Minora] : normal [Normal] : normal [Uterine Adnexae] : normal [IUD String] : an IUD string was noted [FreeTextEntry6] : Left breast feels slightly more dense at 9 o'clock to 3 o'clock superior aspect

## 2023-03-29 ENCOUNTER — APPOINTMENT (OUTPATIENT)
Dept: BARIATRICS/WEIGHT MGMT | Facility: CLINIC | Age: 38
End: 2023-03-29
Payer: COMMERCIAL

## 2023-03-29 PROCEDURE — 99213 OFFICE O/P EST LOW 20 MIN: CPT | Mod: 95

## 2023-03-31 NOTE — ASSESSMENT
[FreeTextEntry1] : BARIATRIC SURGERY HISTORY: none\par \par OBESITY COMORBIDITIES: HTN, HLD, pre-dm, met-s\par \par ANTI-OBESITY MEDICATIONS: none\par \par OBESITY MEDICATION SIDE EFFECTS: n/a\par \par \par Recommend the following:\par \par cont whole foods based eating strategy limiting refined carbs and added fats - plant forward\par cont advancing physical activity\par recommended minimum of 6 hours/sleep per night - will work on stress mgt\par increase mounjaro to 5mg for next month given profound results of first month\par \par rtc in 4 weeks.\par

## 2023-04-04 NOTE — ASSESSMENT
[FreeTextEntry1] : 35 year old female with chronic hypertension and a recent pregnancy, delivered in December 2020 with complicating preeclampsia.\par Now nursing her baby. Maintained on Labetalol 300 mg TID and Procardia 60 mg to control blood pressure. \par \par PLAN\par Continue to monitor BP at home\par Continue on current medical regimen: Decrease Labetalol 200 mg TID , Procardia 60 mg QD - will decrease the labetalol to 200 mg bid amd c/w procardia 60 mg\par Call back with BP log in 2 weeks and if optimal will decrease the dose further. \par \par Recommend exercise stress testing for cardiac screening postpartum\par Encouraged health eating, Mediterranean diet\par Encouraged exercise 150 minutes per week\par Patient has requested to connect with nutritionist, Ginny Brumfield \par Will repeat lipid profile in 6 months \par \par F/u in 8 weeks. \par \par 
self-care/home management

## 2023-04-14 DIAGNOSIS — N63.0 UNSPECIFIED LUMP IN UNSPECIFIED BREAST: ICD-10-CM

## 2023-04-14 DIAGNOSIS — Z12.39 ENCOUNTER FOR OTHER SCREENING FOR MALIGNANT NEOPLASM OF BREAST: ICD-10-CM

## 2023-04-18 NOTE — OB RN PATIENT PROFILE - PRETERM DELIVERIES, OB PROFILE
Stefanie Tate is a 79 year old female presenting to [facility]. Patient states she is taking her medications as directed. Patient denies any chest pain, headaches, visual changes, or shortness of breath.     Denies known Latex allergy or symptoms of Latex sensitivity.  Medications verified, no changes.   0

## 2023-04-25 NOTE — END OF VISIT
[Time Spent: ___ minutes] : I have spent [unfilled] minutes of time on the encounter. Detail Level: Detailed Quality 431: Preventive Care And Screening: Unhealthy Alcohol Use - Screening: Patient not identified as an unhealthy alcohol user when screened for unhealthy alcohol use using a systematic screening method Quality 226: Preventive Care And Screening: Tobacco Use: Screening And Cessation Intervention: Patient screened for tobacco use and is an ex/non-smoker Quality 111:Pneumonia Vaccination Status For Older Adults: Pneumococcal vaccine (PPSV23) administered on or after patient’s 60th birthday and before the end of the measurement period Quality 130: Documentation Of Current Medications In The Medical Record: Current Medications Documented Quality 47: Advance Care Plan: Advance Care Planning discussed and documented; advance care plan or surrogate decision maker documented in the medical record. Additional Notes: *** Meds documented to the best of my ability with the resources available Quality 110: Preventive Care And Screening: Influenza Immunization: Influenza Immunization previously received during influenza season

## 2023-04-27 ENCOUNTER — NON-APPOINTMENT (OUTPATIENT)
Age: 38
End: 2023-04-27

## 2023-04-27 ENCOUNTER — LABORATORY RESULT (OUTPATIENT)
Age: 38
End: 2023-04-27

## 2023-04-27 ENCOUNTER — APPOINTMENT (OUTPATIENT)
Dept: INTERNAL MEDICINE | Facility: CLINIC | Age: 38
End: 2023-04-27
Payer: COMMERCIAL

## 2023-04-27 VITALS
SYSTOLIC BLOOD PRESSURE: 120 MMHG | DIASTOLIC BLOOD PRESSURE: 80 MMHG | OXYGEN SATURATION: 97 % | HEART RATE: 82 BPM | BODY MASS INDEX: 31.71 KG/M2 | WEIGHT: 202 LBS | HEIGHT: 67 IN

## 2023-04-27 DIAGNOSIS — N61.0 MASTITIS WITHOUT ABSCESS: ICD-10-CM

## 2023-04-27 PROCEDURE — 36415 COLL VENOUS BLD VENIPUNCTURE: CPT

## 2023-04-27 PROCEDURE — 99395 PREV VISIT EST AGE 18-39: CPT | Mod: 25

## 2023-04-27 PROCEDURE — 93000 ELECTROCARDIOGRAM COMPLETE: CPT

## 2023-05-01 ENCOUNTER — APPOINTMENT (OUTPATIENT)
Dept: CARDIOLOGY | Facility: CLINIC | Age: 38
End: 2023-05-01
Payer: COMMERCIAL

## 2023-05-01 ENCOUNTER — NON-APPOINTMENT (OUTPATIENT)
Age: 38
End: 2023-05-01

## 2023-05-01 VITALS
WEIGHT: 201 LBS | HEART RATE: 70 BPM | DIASTOLIC BLOOD PRESSURE: 76 MMHG | SYSTOLIC BLOOD PRESSURE: 112 MMHG | BODY MASS INDEX: 31.55 KG/M2 | OXYGEN SATURATION: 98 % | HEIGHT: 67 IN

## 2023-05-01 PROCEDURE — 93000 ELECTROCARDIOGRAM COMPLETE: CPT

## 2023-05-01 PROCEDURE — 99214 OFFICE O/P EST MOD 30 MIN: CPT | Mod: 25

## 2023-05-01 NOTE — HISTORY OF PRESENT ILLNESS
[FreeTextEntry1] : Ms. Jacobson is a 37 year old  with a history of  chronic hypertension diagnosed in  2016 (on propranolol and HCTZ) presents in for follow up. She gave birth to her first child on December 20, 2020-> delivered with preeclampsia, treated with IV Magnesium and discharged home on Labetalol 300 mg TID and Procardia 60 mg daily.  \par \par Interval Note:\par 5/1/2023\par Ms Jacobson is a 37 year old returning today for her cardiovascular follow up\par EKG:\par Blood pressure:\par Blood results 4/27/2023\par Lipid Panel: TG 72  HDL 37  (impove)\par TSH, Free T3 T4 wnl ESR 43\par \par 3/22/2021 Stress test - normal \par \par Recent echocardiogram performed on February 12, 2021 demonstrated:\par Normal LV systolic function\par No segmental wall motion abnormalities\par Normal diastolic function\par Normal RV size and function\par MItral annular calcification.\par No MR\par \par # Chronic HTN :\par  Continue Atenolol - Chlorthalidone 100- 25 mg QD \par BP Stable \par Encouraged Patient to monitor BP at home and keep a log and report results back to us for evaluation. Based on results, we will adjust medications as necessary. \par Additionally, encouraged heart healthy diet and exercise as tolerated.\par EKG with no acute changes.\par \par \par # HLD : 4/23/2023- TG 72  HDL 37  (improved)\par Encouraged health eating, Mediterranean diet\par Encouraged exercise 150 minutes per week\par \par \par \par Interval Note\par October 26, 2022\par \par Ms. Jacobson presents for a cariodvascular follow up and blood pressure management.

## 2023-05-01 NOTE — DISCUSSION/SUMMARY
[FreeTextEntry1] : Taryn Hale is a 37 year old  with a history of  chronic hypertension diagnosed in  2016 (on propranolol and HCTZ) presents in for follow up. She gave birth to her first child on December 20, 2020-> delivered with preeclampsia, treated with IV Magnesium and discharged home on Labetalol 300 mg TID and Procardia 60 mg daily.  \par BP log 106-134/71-86\par Blood work - , , HDL 38, \par Stress test - normal \par \par Recent echocardiogram performed on February 12, 2021 demonstrated:\par Normal LV systolic function\par No segmental wall motion abnormalities\par NOrmal diastolic function\par Normal RV size and function\par MItral annular calcification.\par No MR\par \par Denies any issues with shortness of breath, palpitations or chest discomfort. She has not been exercising during these cold months inside. \par \par \par # Chronic HTN : Continue Atenolol - Chlorthalidone 100- 25 mg QD \par BP Stable \par Encouraged Patient to monitor BP at home and keep a log and report results back to us for evaluation. Based on results, we will adjust medications as necessary. \par Additionally, encouraged heart healthy diet and exercise as tolerated.\par EKG with no acute changes.\par \par \par # HLD : 2/17/22 - TG 73, , HDL 55, \par Repeat Lipid panle and chem \par Encouraged health eating, Mediterranean diet\par Encouraged exercise 150 minutes per week\par Patient has requested to connect with nutritionist, Ginny Brumfield \par Will repeat lipid profile in 6 months \par \par FU in 6 months [EKG obtained to assist in diagnosis and management of assessed problem(s)] : EKG obtained to assist in diagnosis and management of assessed problem(s)

## 2023-05-01 NOTE — CARDIOLOGY SUMMARY
[de-identified] : 5/1/23\par \par nsr [de-identified] : 3/22/2021\par  Unremarkable\par  Bryon exercise duration 9: 02 min\par performance 10 METS average for age and gender [de-identified] : Recent echocardiogram performed on February 12, 2021 demonstrated:\par Normal LV systolic function\par No segmental wall motion abnormalities\par NOrmal diastolic function\par Normal RV size and function\par MItral annular calcification.\par No MR

## 2023-05-01 NOTE — ASSESSMENT
[FreeTextEntry1] : 35 year old female with chronic hypertension and a recent pregnancy, delivered in December 2020 with complicating preeclampsia returns for follow up. \par \par # Chronic HTN :\par  Continue Atenolol - Chlorthalidone 100- 25 mg QD \par BP Stable \par Encouraged Patient to monitor BP at home and keep a log and report results back to us for evaluation. Based on results, we will adjust medications as necessary. \par Additionally, encouraged heart healthy diet and exercise as tolerated.\par EKG with no acute changes.\par \par \par # HLD : \par Repeat Lipid panle and chem \par Encouraged health eating, Mediterranean diet\par Encouraged exercise 150 minutes per week\par Patient has requested to connect with nutritionist, Ginny Brumfield \par Will repeat lipid profile in 6 months \par \par FU in 6 months \par WIll discus results over  phone.\par \par

## 2023-05-02 LAB
ALBUMIN SERPL ELPH-MCNC: 4.8 G/DL
ALP BLD-CCNC: 88 U/L
ALT SERPL-CCNC: 38 U/L
ANION GAP SERPL CALC-SCNC: 14 MMOL/L
APPEARANCE: CLEAR
AST SERPL-CCNC: 21 U/L
BASOPHILS # BLD AUTO: 0.03 K/UL
BASOPHILS NFR BLD AUTO: 0.4 %
BILIRUB SERPL-MCNC: 0.5 MG/DL
BILIRUBIN URINE: NEGATIVE
BLOOD URINE: NEGATIVE
BUN SERPL-MCNC: 14 MG/DL
CALCIUM SERPL-MCNC: 10.8 MG/DL
CHLORIDE SERPL-SCNC: 100 MMOL/L
CHOLEST SERPL-MCNC: 200 MG/DL
CO2 SERPL-SCNC: 23 MMOL/L
COLOR: YELLOW
CREAT SERPL-MCNC: 0.66 MG/DL
EGFR: 114 ML/MIN/1.73M2
EOSINOPHIL # BLD AUTO: 0.16 K/UL
EOSINOPHIL NFR BLD AUTO: 2 %
ERYTHROCYTE [SEDIMENTATION RATE] IN BLOOD BY WESTERGREN METHOD: 43 MM/HR
ESTIMATED AVERAGE GLUCOSE: 117 MG/DL
GLUCOSE QUALITATIVE U: NEGATIVE MG/DL
GLUCOSE SERPL-MCNC: 81 MG/DL
HBA1C MFR BLD HPLC: 5.7 %
HCT VFR BLD CALC: 43.5 %
HDLC SERPL-MCNC: 37 MG/DL
HGB BLD-MCNC: 14 G/DL
IMM GRANULOCYTES NFR BLD AUTO: 0.4 %
KETONES URINE: NEGATIVE MG/DL
LDLC SERPL CALC-MCNC: 149 MG/DL
LEUKOCYTE ESTERASE URINE: NEGATIVE
LYMPHOCYTES # BLD AUTO: 2.14 K/UL
LYMPHOCYTES NFR BLD AUTO: 27.4 %
MAN DIFF?: NORMAL
MCHC RBC-ENTMCNC: 30.2 PG
MCHC RBC-ENTMCNC: 32.2 GM/DL
MCV RBC AUTO: 94 FL
MONOCYTES # BLD AUTO: 0.63 K/UL
MONOCYTES NFR BLD AUTO: 8.1 %
NEUTROPHILS # BLD AUTO: 4.82 K/UL
NEUTROPHILS NFR BLD AUTO: 61.7 %
NITRITE URINE: NEGATIVE
NONHDLC SERPL-MCNC: 164 MG/DL
PH URINE: 5.5
PLATELET # BLD AUTO: 319 K/UL
POTASSIUM SERPL-SCNC: 3.4 MMOL/L
PROT SERPL-MCNC: 7.9 G/DL
PROTEIN URINE: NEGATIVE MG/DL
RBC # BLD: 4.63 M/UL
RBC # FLD: 12.7 %
SODIUM SERPL-SCNC: 137 MMOL/L
SPECIFIC GRAVITY URINE: 1.03
T3 SERPL-MCNC: 125 NG/DL
T3FREE SERPL-MCNC: 3.54 PG/ML
T3RU NFR SERPL: 1.1 TBI
T4 FREE SERPL-MCNC: 1.4 NG/DL
T4 SERPL-MCNC: 9.2 UG/DL
TRIGL SERPL-MCNC: 72 MG/DL
TSH SERPL-ACNC: 0.69 UIU/ML
UROBILINOGEN URINE: 0.2 MG/DL
WBC # FLD AUTO: 7.81 K/UL

## 2023-05-04 ENCOUNTER — NON-APPOINTMENT (OUTPATIENT)
Age: 38
End: 2023-05-04

## 2023-05-04 ENCOUNTER — APPOINTMENT (OUTPATIENT)
Dept: SURGICAL ONCOLOGY | Facility: CLINIC | Age: 38
End: 2023-05-04
Payer: COMMERCIAL

## 2023-05-04 ENCOUNTER — LABORATORY RESULT (OUTPATIENT)
Age: 38
End: 2023-05-04

## 2023-05-04 VITALS
RESPIRATION RATE: 15 BRPM | BODY MASS INDEX: 31.55 KG/M2 | DIASTOLIC BLOOD PRESSURE: 81 MMHG | HEIGHT: 67 IN | WEIGHT: 201 LBS | SYSTOLIC BLOOD PRESSURE: 117 MMHG | HEART RATE: 69 BPM | OXYGEN SATURATION: 98 %

## 2023-05-04 PROCEDURE — 99204 OFFICE O/P NEW MOD 45 MIN: CPT

## 2023-05-09 PROBLEM — N61.1 ABSCESS OF BREAST, LEFT: Status: ACTIVE | Noted: 2023-05-01

## 2023-05-10 LAB — BACTERIA WND CULT: ABNORMAL

## 2023-05-10 NOTE — HISTORY OF PRESENT ILLNESS
[de-identified] : Patient started seeing Lito Clayton for medical weight loss and is now on Mounjaro 0.5 mg q week and has lost 22 lbs since 1/2023\par \par She reports hot red tenderness along her left nipple/aureola for the past week--getting more tender [FreeTextEntry1] : Patient presents for CPE/comprehensive medical evaluation today.\par

## 2023-05-10 NOTE — PHYSICAL EXAM
[No Acute Distress] : no acute distress [Well Nourished] : well nourished [Well Developed] : well developed [Well-Appearing] : well-appearing [Normal Sclera/Conjunctiva] : normal sclera/conjunctiva [PERRL] : pupils equal round and reactive to light [EOMI] : extraocular movements intact [Normal Outer Ear/Nose] : the outer ears and nose were normal in appearance [No JVD] : no jugular venous distention [Normal Oropharynx] : the oropharynx was normal [No Lymphadenopathy] : no lymphadenopathy [Supple] : supple [Thyroid Normal, No Nodules] : the thyroid was normal and there were no nodules present [No Respiratory Distress] : no respiratory distress  [No Accessory Muscle Use] : no accessory muscle use [Clear to Auscultation] : lungs were clear to auscultation bilaterally [Normal Rate] : normal rate  [Regular Rhythm] : with a regular rhythm [Normal S1, S2] : normal S1 and S2 [No Murmur] : no murmur heard [No Carotid Bruits] : no carotid bruits [No Varicosities] : no varicosities [No Abdominal Bruit] : a ~M bruit was not heard ~T in the abdomen [Pedal Pulses Present] : the pedal pulses are present [No Edema] : there was no peripheral edema [No Palpable Aorta] : no palpable aorta [No Extremity Clubbing/Cyanosis] : no extremity clubbing/cyanosis [Normal Appearance] : normal in appearance [No Axillary Lymphadenopathy] : no axillary lymphadenopathy [Soft] : abdomen soft [Non Tender] : non-tender [Non-distended] : non-distended [No Masses] : no abdominal mass palpated [No HSM] : no HSM [Normal Bowel Sounds] : normal bowel sounds [No CVA Tenderness] : no CVA  tenderness [No Spinal Tenderness] : no spinal tenderness [No Joint Swelling] : no joint swelling [Grossly Normal Strength/Tone] : grossly normal strength/tone [No Rash] : no rash [Coordination Grossly Intact] : coordination grossly intact [No Focal Deficits] : no focal deficits [Normal Gait] : normal gait [Deep Tendon Reflexes (DTR)] : deep tendon reflexes were 2+ and symmetric [Normal Affect] : the affect was normal [Normal Insight/Judgement] : insight and judgment were intact [de-identified] : dense fibrocystic breasts/   ~ 3-4 cm area of warmth erythema along left breast at 9 O'clock region--tender -no fluctuance but palpable ?breast ducts c/w mastitis

## 2023-05-10 NOTE — PHYSICAL EXAM
[No Acute Distress] : no acute distress [Well Nourished] : well nourished [Well-Appearing] : well-appearing [Well Developed] : well developed [Normal Sclera/Conjunctiva] : normal sclera/conjunctiva [PERRL] : pupils equal round and reactive to light [EOMI] : extraocular movements intact [Normal Outer Ear/Nose] : the outer ears and nose were normal in appearance [Normal Oropharynx] : the oropharynx was normal [No JVD] : no jugular venous distention [No Lymphadenopathy] : no lymphadenopathy [Supple] : supple [Thyroid Normal, No Nodules] : the thyroid was normal and there were no nodules present [No Respiratory Distress] : no respiratory distress  [No Accessory Muscle Use] : no accessory muscle use [Clear to Auscultation] : lungs were clear to auscultation bilaterally [Normal Rate] : normal rate  [Regular Rhythm] : with a regular rhythm [Normal S1, S2] : normal S1 and S2 [No Murmur] : no murmur heard [No Carotid Bruits] : no carotid bruits [No Varicosities] : no varicosities [No Abdominal Bruit] : a ~M bruit was not heard ~T in the abdomen [Pedal Pulses Present] : the pedal pulses are present [No Edema] : there was no peripheral edema [No Palpable Aorta] : no palpable aorta [No Extremity Clubbing/Cyanosis] : no extremity clubbing/cyanosis [Normal Appearance] : normal in appearance [No Axillary Lymphadenopathy] : no axillary lymphadenopathy [Soft] : abdomen soft [Non Tender] : non-tender [Non-distended] : non-distended [No Masses] : no abdominal mass palpated [No HSM] : no HSM [Normal Bowel Sounds] : normal bowel sounds [No CVA Tenderness] : no CVA  tenderness [No Spinal Tenderness] : no spinal tenderness [No Joint Swelling] : no joint swelling [Grossly Normal Strength/Tone] : grossly normal strength/tone [No Rash] : no rash [Coordination Grossly Intact] : coordination grossly intact [No Focal Deficits] : no focal deficits [Normal Gait] : normal gait [Deep Tendon Reflexes (DTR)] : deep tendon reflexes were 2+ and symmetric [Normal Affect] : the affect was normal [Normal Insight/Judgement] : insight and judgment were intact [de-identified] : dense fibrocystic breasts/   ~ 3-4 cm area of warmth erythema along left breast at 9 O'clock region--tender -no fluctuance but palpable ?breast ducts c/w mastitis

## 2023-05-10 NOTE — DISCUSSION/SUMMARY
[FreeTextEntry1] : TC--Discussed with pt that left breast sonogram revealed ? 3.8 cm subdermal abscess formation with 2 adjacent smaller abscesses.\par \par Pt reports decreased erythema and induration and warmth since starting antibiotics.\par \par I recommend f/u with surgical oncologist /breast surgeon for further evaluation.\par If breast pain worsens- pt to contact me immediately.

## 2023-05-10 NOTE — HEALTH RISK ASSESSMENT
[Good] : ~his/her~  mood as  good [Yes] : Yes [2 - 4 times a month (2 pts)] : 2-4 times a month (2 points) [Never (0 pts)] : Never (0 points) [No] : In the past 12 months have you used drugs other than those required for medical reasons? No [0] : 1) Little interest or pleasure doing things: Not at all (0) [1] : 2) Feeling down, depressed, or hopeless for several days (1) [de-identified] : per HPI [Audit-CScore] : 2 [de-identified] : walks and dance classes at [employer] [de-identified] : low salt diet [[id number]] : 1 [Patient reported PAP Smear was normal] : Patient reported PAP Smear was normal [Hepatitis C test declined] : Hepatitis C test declined [HIV test declined] : HIV test declined [PapSmearDate] : 03/23 [PapSmearComments] : with Dr. Parham  [Never] : Never

## 2023-05-10 NOTE — HISTORY OF PRESENT ILLNESS
[de-identified] : Patient started seeing Lito Clayton for medical weight loss and is now on Mounjaro 0.5 mg q week and has lost 22 lbs since 1/2023\par \par She reports hot red tenderness along her left nipple/aureola for the past week--getting more tender [FreeTextEntry1] : Patient presents for CPE/comprehensive medical evaluation today.\par

## 2023-05-10 NOTE — HEALTH RISK ASSESSMENT
[Good] : ~his/her~  mood as  good [Yes] : Yes [2 - 4 times a month (2 pts)] : 2-4 times a month (2 points) [Never (0 pts)] : Never (0 points) [No] : In the past 12 months have you used drugs other than those required for medical reasons? No [0] : 1) Little interest or pleasure doing things: Not at all (0) [de-identified] : per HPI [1] : 2) Feeling down, depressed, or hopeless for several days (1) [Audit-CScore] : 2 [de-identified] : walks and dance classes at [employer] [de-identified] : low salt diet [[id number]] : 1 [Patient reported PAP Smear was normal] : Patient reported PAP Smear was normal [Hepatitis C test declined] : Hepatitis C test declined [HIV test declined] : HIV test declined [PapSmearDate] : 03/23 [PapSmearComments] : with Dr. Parham  [Never] : Never

## 2023-05-11 ENCOUNTER — APPOINTMENT (OUTPATIENT)
Dept: SURGICAL ONCOLOGY | Facility: CLINIC | Age: 38
End: 2023-05-11
Payer: COMMERCIAL

## 2023-05-11 VITALS
OXYGEN SATURATION: 98 % | BODY MASS INDEX: 31.55 KG/M2 | SYSTOLIC BLOOD PRESSURE: 118 MMHG | HEART RATE: 95 BPM | WEIGHT: 201 LBS | DIASTOLIC BLOOD PRESSURE: 83 MMHG | HEIGHT: 67 IN | RESPIRATION RATE: 16 BRPM

## 2023-05-11 DIAGNOSIS — N61.1 ABSCESS OF THE BREAST AND NIPPLE: ICD-10-CM

## 2023-05-11 PROCEDURE — 99213 OFFICE O/P EST LOW 20 MIN: CPT

## 2023-05-18 ENCOUNTER — APPOINTMENT (OUTPATIENT)
Dept: SURGICAL ONCOLOGY | Facility: CLINIC | Age: 38
End: 2023-05-18

## 2023-05-18 ENCOUNTER — APPOINTMENT (OUTPATIENT)
Dept: OBGYN | Facility: CLINIC | Age: 38
End: 2023-05-18
Payer: COMMERCIAL

## 2023-05-18 VITALS
SYSTOLIC BLOOD PRESSURE: 124 MMHG | WEIGHT: 201 LBS | HEIGHT: 67 IN | BODY MASS INDEX: 31.55 KG/M2 | DIASTOLIC BLOOD PRESSURE: 84 MMHG

## 2023-05-18 DIAGNOSIS — R87.610 ATYPICAL SQUAMOUS CELLS OF UNDETERMINED SIGNIFICANCE ON CYTOLOGIC SMEAR OF CERVIX (ASC-US): ICD-10-CM

## 2023-05-18 PROCEDURE — 57454 BX/CURETT OF CERVIX W/SCOPE: CPT

## 2023-05-24 ENCOUNTER — APPOINTMENT (OUTPATIENT)
Dept: ULTRASOUND IMAGING | Facility: CLINIC | Age: 38
End: 2023-05-24

## 2023-05-24 ENCOUNTER — APPOINTMENT (OUTPATIENT)
Dept: MAMMOGRAPHY | Facility: CLINIC | Age: 38
End: 2023-05-24

## 2023-05-24 NOTE — PLAN
[FreeTextEntry1] : 38 y/o  presents for colposcopy due to h/o abnormal PAP smear. ASCUS PAP on 3/3023, LSIL PAP 3/2022.\par \par -tissue sent to pathology\par -f/u results of biopsy, if normal, RTO in 1 year for annual

## 2023-05-24 NOTE — PROCEDURE
[Colposcopy] : Colposcopy  [Time out performed] : Pre-procedure time out performed.  Patient's name, date of birth and procedure confirmed. [Consent Obtained] : Consent obtained [Risks] : risks [Benefits] : benefits [Alternatives] : alternatives [Patient] : patient [Infection] : infection [Bleeding] : bleeding [Allergic Reaction] : allergic reaction [Hemostasis Obtained] : Hemostasis obtained [Tolerated Well] : the patient tolerated the procedure well [ASCUS] : ASCUS [ECC Performed] : ECC performed [Biopsy] : biopsy taken [de-identified] : 2 [de-identified] : 5 o'clock\par ECC

## 2023-05-24 NOTE — END OF VISIT
[FreeTextEntry2] : I, Tiki Gore, acted as a scribe on behalf of Dr. Lenora Parham on 05/18/2023 .\par \par All medical entries made by the scribe were at my, Dr. Lenora Parham, direction and personally dictated by me on 05/18/2023 . I have reviewed the chart and agree that the record accurately reflects my personal performance of the history, physical exam, assessment and plan. I have also personally directed, reviewed, and agreed with the chart.\par

## 2023-05-30 ENCOUNTER — APPOINTMENT (OUTPATIENT)
Dept: SURGICAL ONCOLOGY | Facility: CLINIC | Age: 38
End: 2023-05-30
Payer: COMMERCIAL

## 2023-05-30 VITALS
HEART RATE: 74 BPM | SYSTOLIC BLOOD PRESSURE: 133 MMHG | HEIGHT: 67 IN | OXYGEN SATURATION: 97 % | BODY MASS INDEX: 31.55 KG/M2 | RESPIRATION RATE: 16 BRPM | WEIGHT: 201 LBS | DIASTOLIC BLOOD PRESSURE: 84 MMHG

## 2023-05-30 DIAGNOSIS — R92.8 OTHER ABNORMAL AND INCONCLUSIVE FINDINGS ON DIAGNOSTIC IMAGING OF BREAST: ICD-10-CM

## 2023-05-30 PROCEDURE — 99213 OFFICE O/P EST LOW 20 MIN: CPT

## 2023-05-31 PROBLEM — R92.8 ABNORMAL FINDING ON BREAST IMAGING: Status: ACTIVE | Noted: 2023-05-31

## 2023-06-02 ENCOUNTER — APPOINTMENT (OUTPATIENT)
Dept: BARIATRICS/WEIGHT MGMT | Facility: CLINIC | Age: 38
End: 2023-06-02
Payer: COMMERCIAL

## 2023-06-02 PROCEDURE — 99213 OFFICE O/P EST LOW 20 MIN: CPT | Mod: 95

## 2023-06-02 NOTE — HISTORY OF PRESENT ILLNESS
[FreeTextEntry1] : 38 yo woman with obesity, pre-dm, HTN, HLD (metabolic syndrome) presents for initial obesity medicine eval.\par \par current weight = was down as low as 199, possibly 201 today lbs (20 lb weight loss from max)\par height = 5'7\par \par tolerating mounjaro 5mg without incident but after a while appetite and cravings  have returned\par \par trying to be more active\par \par otherwise without new complaints today

## 2023-06-02 NOTE — ASSESSMENT
[FreeTextEntry1] : BARIATRIC SURGERY HISTORY: none\par \par OBESITY COMORBIDITIES: HTN, HLD, pre-dm, met-s\par \par ANTI-OBESITY MEDICATIONS: tirzepatide\par \par OBESITY MEDICATION SIDE EFFECTS: none\par \par \par Recommend the following:\par \par cont whole foods based eating strategy limiting refined carbs and added fats - plant forward\par cautioned against high fat foods as this can suppress GLP-1 function\par cont advancing physical activity\par recommended minimum of 6 hours/sleep per night - will work on stress mgt\par increase mounjaro to 7.5mg \par \par rtc in 4 weeks.\par

## 2023-06-03 LAB — FUNGUS SPEC CULT ORG #8: NORMAL

## 2023-07-06 ENCOUNTER — APPOINTMENT (OUTPATIENT)
Dept: SURGICAL ONCOLOGY | Facility: CLINIC | Age: 38
End: 2023-07-06
Payer: COMMERCIAL

## 2023-07-06 VITALS
HEART RATE: 60 BPM | OXYGEN SATURATION: 99 % | BODY MASS INDEX: 31.08 KG/M2 | DIASTOLIC BLOOD PRESSURE: 79 MMHG | HEIGHT: 67 IN | RESPIRATION RATE: 16 BRPM | WEIGHT: 198 LBS | SYSTOLIC BLOOD PRESSURE: 124 MMHG

## 2023-07-06 PROCEDURE — 99213 OFFICE O/P EST LOW 20 MIN: CPT

## 2023-07-06 NOTE — HISTORY OF PRESENT ILLNESS
[de-identified] : LYNN ALAS is a 37 year F who presents for initial evaluation of Left breast abscess \par \par Reports noticing Left breast changes at beginning of February and end of march, no fever or chills at that time. Saw GYN in Late March at that time area turned slightly pink and started to look like a bruise in April. when to see PCP in late April and was started on Cephalexin 500 mg QID on 23 - 6 days on antibiotic. Patient reports area is improving but .  \par \par \par Referred by: Dr. Eve Padilla ( PCP) \par \par PMHx: Chronic HTN dx 2016, pre-dm, HLD (Metabolic syndrome), Obesity \par PSHx:  x1  \par Meds: Atenolol- HCTZ 100-25 mg, Mounjaro 5mg subcutaneous solution pen- injector \par NKDA\par Family Hx: Non Hodgkin lymphoma ( Maternal Uncle unknown age of diagnosis) \par GYN: , menarche age 14, LMP 2020. Age at first pregnancy 35.  Y ( 4-5 months)  \par Mirena IUD in place \par Bra size: 40 DDD\par Occupation:  \par Social: Smoking: Denies        ETOH: social \par

## 2023-07-06 NOTE — RESULTS/DATA
[FreeTextEntry1] : BREAST PATH/RAD REVIEW\par \par [facility]:\par 4/27/2023 BL Dx MG/US: BIRADs 3, Heterogenously dense,\par - L 10N2 3.8 cm oval subdermal hypoechoic mass w/ some peripheral vascularity and overlying skin thickening most c/w breast abscess. Similar adjacent masses measuring 1.4 and 0.5 cm deep and medial to the primary lesion - rec 2 month f/u L Dx US after appropriate Tx

## 2023-07-06 NOTE — ASSESSMENT
[FreeTextEntry1] : LYNN ALAS is a 37 year F w/ L 10N2 3.8 cm subdermal abscess with 2 adjacent smaller abscess, not improved on PO antibioitcs ( Cephalexin 500 mg QID) \par \par Clinical breast exam is consistent with left breast abscess that has partially resolved.   The abscess has self-drained and has a residual 5mm opening.   \par The abscess opening was examined and purulent drainage was encountered and sent for cultures/micro exam.  \par The wound was thoroughly irrigated and 1/4in plain packing was inserted.  \par \par Next imagin month f/u L Dx US due late 2023 \par RTO 1 week for wound check. She knows to return sooner if any breast abnormalities or if any breast issues/concerns arise \par

## 2023-07-06 NOTE — PHYSICAL EXAM
[Normal] : supple, no neck mass and thyroid not enlarged [Normal Neck Lymph Nodes] : normal neck lymph nodes  [Normal Supraclavicular Lymph Nodes] : normal supraclavicular lymph nodes [Normal Axillary Lymph Nodes] : normal axillary lymph nodes [Normal] : oriented to person, place and time, with appropriate affect [de-identified] : Left breast with 9:00 periareolar erythema and induration with open punctate wound.   No left or right breast masses.  No clinical lymphadenopathy.   [de-identified] : Normal respiratory effort

## 2023-07-08 NOTE — ASSESSMENT
[FreeTextEntry1] : LYNN ALAS is a 37 year F w/ L 10N2 3.8 cm subdermal abscess with 2 adjacent smaller abscess - clinically improving.  \par 23 Fungal Culture: In process\par 23 Fluid cx - Abscess gram stain: Rare Enterococcus faecalis - susceptible to Augmentin \par 23 Acid Fast smear/culture: No acid-fast bacilli seen by fluorochrome stain \par \par Left breast clinical exam is improving.  small punctate remains open and self-draining.  \par Next imagin month f/u L Dx US due late 2023 \par RTO 2 weeks. She knows to return sooner if any breast abnormalities or if any breast issues/concerns arise \par

## 2023-07-08 NOTE — HISTORY OF PRESENT ILLNESS
[de-identified] : LYNN ALAS is a 37 year F who presents for a follow up visit for a left breast abscess \par \par Reports noticing Left breast changes at beginning of February and end of march, no fever or chills at that time. Saw GYN in Late March at that time area turned slightly pink and started to look like a bruise in April. when to see PCP in late April and was started on Cephalexin 500 mg QID on 23 - 6 days on antibiotic. Patient reports area is improving but .\par \par She has since completed her antibiotics and returns with no complaints - States wound is healing well.   wound cultures from last visit are NGTD.   \par \par \par Referred by: Dr. Eve Padilla ( PCP) \par \par PMHx: Chronic HTN dx , pre-dm, HLD (Metabolic syndrome), Obesity \par PSHx:  x1  \par Meds: Atenolol- HCTZ 100-25 mg, Mounjaro 5mg subcutaneous solution pen- injector \par NKDA\par Family Hx: Non Hodgkin lymphoma ( Maternal Uncle unknown age of diagnosis) \par GYN: , menarche age 14, LMP 2020. Age at first pregnancy 35.  Y ( 4-5 months)  \par Mirena IUD in place \par Bra size: 40 DDD\par Occupation:  \par Social: Smoking: Denies        ETOH: social \par

## 2023-07-08 NOTE — ASSESSMENT
[FreeTextEntry1] : LYNN ALAS is a 37 year F w/ L 10N2 3.8 cm subdermal abscess with 2 adjacent smaller abscess - clinically improved.  \par 5/4/23 Fungal Culture: Negative - No fungus isolated at 3 weeks \par 5/4/23 Fluid cx - Abscess gram stain: Rare Enterococcus faecalis - susceptible to Augmentin \par 5/4/23 Acid Fast smear/culture: Negative - No AFB isolated in 3 weeks \par \par Clinical breast exam is improving.   punctate opening is healing slowly.   Will continue to observe and if remains open at next visit will apply silver nitrate.  \par Next imaging:  L Dx US due late June 2023 \par RTO after follow up imaging in June . She knows to return sooner if any breast abnormalities or if any breast issues/concerns arise \par

## 2023-07-08 NOTE — PHYSICAL EXAM
[Normal] : supple, no neck mass and thyroid not enlarged [Normal Neck Lymph Nodes] : normal neck lymph nodes  [Normal Supraclavicular Lymph Nodes] : normal supraclavicular lymph nodes [Normal Axillary Lymph Nodes] : normal axillary lymph nodes [Normal] : oriented to person, place and time, with appropriate affect [de-identified] : Left breast with no erythema or induration.  Prior punctate has closed.   No left or right breast masses.  No clinical lymphadenopathy.   [de-identified] : Normal respiratory effort

## 2023-07-08 NOTE — HISTORY OF PRESENT ILLNESS
[de-identified] : LYNN ALAS is a 37 year F who presents for clinical follow-up s/p Left breast abscess\par \par Initial H&P: \par Reports noticing Left breast changes at beginning of February and end of march, no fever or chills at that time. Saw GYN in Late March at that time area turned slightly pink and started to look like a bruise in April. when to see PCP in late April and was started on Cephalexin 500 mg QID on 4/29/23 - 6 days on antibiotic. Patient reports area is improving but .\par \par Significant Family Hx: Non Hodgkin lymphoma ( Maternal Uncle unknown age of diagnosis) \par PMHx: Chronic HTN dx 2016, pre-dm, HLD (Metabolic syndrome), Obesity \par \par \par 5/11/23 Since last visit has completed her antibiotic regiment and returns with no complaints - Wound is healing well.\par 5/30/23 No new complaints, Denies any fever or chills. States there continues to be a pinpoint opening. \par 7/6/2023: Reports no new breast complaints. Denies any pain, fever or chills \par \par \par \par \par \par \par \par

## 2023-07-08 NOTE — HISTORY OF PRESENT ILLNESS
[de-identified] : LYNN ALAS is a 37 year F who presents for 2 week follow up \par \par Initial H&P: \par Reports noticing Left breast changes at beginning of February and end of march, no fever or chills at that time. Saw GYN in Late March at that time area turned slightly pink and started to look like a bruise in April. when to see PCP in late April and was started on Cephalexin 500 mg QID on 4/29/23 - 6 days on antibiotic. Patient reports area is improving but .\par \par Significant Family Hx: Non Hodgkin lymphoma ( Maternal Uncle unknown age of diagnosis) \par PMHx: Chronic HTN dx 2016, pre-dm, HLD (Metabolic syndrome), Obesity \par \par 5/11/23 Since last visit has completed her antibiotic regiment and returns with no complaints - Wound is healing well \par \par 5/30/23 No new complaints, Denies any fever or chills. States there continues to be a pinpoint opening. \par \par \par \par \par \par \par \par

## 2023-07-08 NOTE — PHYSICAL EXAM
[Normal] : supple, no neck mass and thyroid not enlarged [Normal Neck Lymph Nodes] : normal neck lymph nodes  [Normal Supraclavicular Lymph Nodes] : normal supraclavicular lymph nodes [Normal Axillary Lymph Nodes] : normal axillary lymph nodes [Normal] : oriented to person, place and time, with appropriate affect [de-identified] : Left breast with minimal erythema and induration with open punctate wound (improved from prior).   No left or right breast masses.  No clinical lymphadenopathy.   [de-identified] : Normal respiratory effort

## 2023-07-08 NOTE — RESULTS/DATA
[FreeTextEntry1] : BREAST PATH/RAD REVIEW\par \par [facility]:\par \par 4/27/2023 BL Dx MG/US: BIRADs 3, Heterogenously dense,\par - L 10N2 3.8 cm oval subdermal hypoechoic mass w/ some peripheral vascularity and overlying skin thickening most c/w breast abscess. Similar adjacent masses measuring 1.4 and 0.5 cm deep and medial to the primary lesion - rec 2 month f/u L Dx US after appropriate Tx \par \par 5/4/23 Fungal Culture: Negative - No fungus isolated at 3 weeks \par 5/4/23 Fluid cx - Abscess gram stain: Rare Enterococcus faecalis - susceptible to Augmentin \par 5/4/23 Acid Fast smear/culture: Negative - No AFB isolated in 3 weeks \par \par 6/29/23 L Dx US: BIRADs 2. L 10N2 superficial abscess now 1.4 cm ( Prev 3.8 cm) w/ resolution of surrounding vascularity and reduction in overlying skin thickening. Adjacent deeper 1.3 and 0.5 cm at L 102 stable in size ( Rec Clinical f/u) \par

## 2023-07-08 NOTE — PHYSICAL EXAM
[Normal] : supple, no neck mass and thyroid not enlarged [Normal Neck Lymph Nodes] : normal neck lymph nodes  [Normal Supraclavicular Lymph Nodes] : normal supraclavicular lymph nodes [Normal Axillary Lymph Nodes] : normal axillary lymph nodes [Normal] : oriented to person, place and time, with appropriate affect [de-identified] : Left breast with no erythema and minimal induration with open punctate wound (improved from prior).   No left or right breast masses.  No clinical lymphadenopathy.   [de-identified] : Normal respiratory effort

## 2023-07-08 NOTE — ASSESSMENT
[FreeTextEntry1] : LYNN ALAS is a 37 year F w/ L 10N2 3.8 cm subdermal abscess with 2 adjacent smaller abscess- now improved s/p abx and wound exploration\par \par Recent L Dx US showing decrease in size of L 10:00 collection to 1.4 cm w/ stable size of adjacent deeper abscesses.    \par We discussed that her clinical exam today is benign and she is asymptomatic.   There is no clinical evidence of abscess or infection.   she has fibrocystic breasts.   Will continue to monitor sonogram findings and and start her annual screening this year.  \par \par \par Next imaging: Left Dx US in 6 months and B/L SC MG in December 2023 \par RTO in 6 months after imaging . She knows to return sooner if any breast abnormalities or if any breast issues/concerns arise \par

## 2023-07-17 ENCOUNTER — TRANSCRIPTION ENCOUNTER (OUTPATIENT)
Age: 38
End: 2023-07-17

## 2023-07-17 RX ORDER — SEMAGLUTIDE 1.7 MG/.75ML
1.7 INJECTION, SOLUTION SUBCUTANEOUS DAILY
Qty: 1 | Refills: 5 | Status: ACTIVE | COMMUNITY
Start: 2023-07-17 | End: 1900-01-01

## 2023-07-26 ENCOUNTER — APPOINTMENT (OUTPATIENT)
Dept: BARIATRICS/WEIGHT MGMT | Facility: CLINIC | Age: 38
End: 2023-07-26
Payer: COMMERCIAL

## 2023-07-26 PROCEDURE — 99213 OFFICE O/P EST LOW 20 MIN: CPT | Mod: 95

## 2023-07-28 RX ORDER — TIRZEPATIDE 2.5 MG/.5ML
2.5 INJECTION, SOLUTION SUBCUTANEOUS
Qty: 1 | Refills: 5 | Status: DISCONTINUED | COMMUNITY
Start: 2023-01-27 | End: 2023-07-28

## 2023-07-28 RX ORDER — TIRZEPATIDE 5 MG/.5ML
5 INJECTION, SOLUTION SUBCUTANEOUS
Qty: 4 | Refills: 5 | Status: DISCONTINUED | COMMUNITY
Start: 2023-03-29 | End: 2023-07-28

## 2023-07-28 RX ORDER — TIRZEPATIDE 7.5 MG/.5ML
7.5 INJECTION, SOLUTION SUBCUTANEOUS
Qty: 1 | Refills: 5 | Status: DISCONTINUED | COMMUNITY
Start: 2023-06-02 | End: 2023-07-28

## 2023-07-28 NOTE — HISTORY OF PRESENT ILLNESS
[FreeTextEntry1] : 36 yo woman with obesity, pre-dm, HTN, HLD (metabolic syndrome) presents for initial obesity medicine eval.\par \par last weight taken in office was 198 lbs\par height = 5'7\par \par was doing well with mounjaro and 20+ lb weight loss but not longer covered\par appetite has increased\par trying to be more active\par \par otherwise without new complaints today

## 2023-07-28 NOTE — ASSESSMENT
[FreeTextEntry1] : BARIATRIC SURGERY HISTORY: none\par \par OBESITY COMORBIDITIES: HTN, HLD, pre-dm, met-s\par \par ANTI-OBESITY MEDICATIONS: tirzepatide\par \par OBESITY MEDICATION SIDE EFFECTS: none\par \par \par Recommend the following:\par \par cont whole foods based eating strategy limiting refined carbs and added fats - plant forward\par cont advancing physical activity\par recommended minimum of 6 hours/sleep per night - will work on stress mgt\par replace mounjaro with wegovy 1.7mg\par \par rtc in 4-6 weeks.\par

## 2023-07-28 NOTE — REASON FOR VISIT
[Home] : at home, [unfilled] , at the time of the visit. [Other Location: e.g. Home (Enter Location, City,State)___] : at [unfilled] [Patient] : the patient [Follow-Up] : a follow-up visit [FreeTextEntry1] : obesity

## 2023-09-20 ENCOUNTER — APPOINTMENT (OUTPATIENT)
Dept: OBGYN | Facility: CLINIC | Age: 38
End: 2023-09-20
Payer: COMMERCIAL

## 2023-09-20 ENCOUNTER — APPOINTMENT (OUTPATIENT)
Dept: BARIATRICS/WEIGHT MGMT | Facility: CLINIC | Age: 38
End: 2023-09-20
Payer: COMMERCIAL

## 2023-09-20 VITALS
DIASTOLIC BLOOD PRESSURE: 92 MMHG | BODY MASS INDEX: 30.92 KG/M2 | HEIGHT: 67 IN | WEIGHT: 197 LBS | SYSTOLIC BLOOD PRESSURE: 137 MMHG

## 2023-09-20 DIAGNOSIS — N89.8 OTHER SPECIFIED NONINFLAMMATORY DISORDERS OF VAGINA: ICD-10-CM

## 2023-09-20 PROCEDURE — 99213 OFFICE O/P EST LOW 20 MIN: CPT | Mod: 95

## 2023-09-20 PROCEDURE — 99213 OFFICE O/P EST LOW 20 MIN: CPT

## 2023-09-21 ENCOUNTER — OUTPATIENT (OUTPATIENT)
Dept: OUTPATIENT SERVICES | Facility: HOSPITAL | Age: 38
LOS: 1 days | End: 2023-09-21

## 2023-09-21 DIAGNOSIS — I10 ESSENTIAL (PRIMARY) HYPERTENSION: ICD-10-CM

## 2023-09-21 DIAGNOSIS — K08.491 PARTIAL LOSS OF TEETH DUE TO OTHER SPECIFIED CAUSE, CLASS I: Chronic | ICD-10-CM

## 2023-09-22 LAB
CANDIDA VAG CYTO: DETECTED
G VAGINALIS+PREV SP MTYP VAG QL MICRO: NOT DETECTED
T VAGINALIS VAG QL WET PREP: NOT DETECTED

## 2023-10-11 ENCOUNTER — APPOINTMENT (OUTPATIENT)
Dept: BARIATRICS/WEIGHT MGMT | Facility: CLINIC | Age: 38
End: 2023-10-11
Payer: COMMERCIAL

## 2023-10-11 VITALS — WEIGHT: 194 LBS | HEIGHT: 67 IN | BODY MASS INDEX: 30.45 KG/M2

## 2023-10-11 PROCEDURE — 97802 MEDICAL NUTRITION INDIV IN: CPT

## 2023-10-19 ENCOUNTER — NON-APPOINTMENT (OUTPATIENT)
Age: 38
End: 2023-10-19

## 2023-10-20 ENCOUNTER — NON-APPOINTMENT (OUTPATIENT)
Age: 38
End: 2023-10-20

## 2023-10-25 ENCOUNTER — NON-APPOINTMENT (OUTPATIENT)
Age: 38
End: 2023-10-25

## 2023-10-25 ENCOUNTER — APPOINTMENT (OUTPATIENT)
Dept: INTERNAL MEDICINE | Facility: CLINIC | Age: 38
End: 2023-10-25
Payer: COMMERCIAL

## 2023-10-25 VITALS
HEIGHT: 67 IN | BODY MASS INDEX: 30.13 KG/M2 | WEIGHT: 192 LBS | TEMPERATURE: 97.7 F | DIASTOLIC BLOOD PRESSURE: 88 MMHG | OXYGEN SATURATION: 99 % | HEART RATE: 80 BPM | SYSTOLIC BLOOD PRESSURE: 123 MMHG

## 2023-10-25 DIAGNOSIS — R05.1 ACUTE COUGH: ICD-10-CM

## 2023-10-25 PROCEDURE — 99213 OFFICE O/P EST LOW 20 MIN: CPT

## 2023-10-25 RX ORDER — BENZONATATE 200 MG/1
200 CAPSULE ORAL
Qty: 20 | Refills: 0 | Status: COMPLETED | COMMUNITY
Start: 2023-10-24

## 2023-10-25 RX ORDER — NYSTATIN 100000 1/G
100000 POWDER TOPICAL TWICE DAILY
Qty: 1 | Refills: 3 | Status: COMPLETED | COMMUNITY
Start: 2022-03-16 | End: 2023-10-25

## 2023-10-25 RX ORDER — AMOXICILLIN 875 MG/1
875 TABLET, FILM COATED ORAL
Qty: 20 | Refills: 0 | Status: COMPLETED | COMMUNITY
Start: 2023-10-24

## 2023-10-25 RX ORDER — CEPHALEXIN 500 MG/1
500 CAPSULE ORAL
Qty: 56 | Refills: 0 | Status: COMPLETED | COMMUNITY
Start: 2023-04-27 | End: 2023-10-25

## 2023-10-25 RX ORDER — PREDNISONE 20 MG/1
20 TABLET ORAL
Qty: 15 | Refills: 0 | Status: ACTIVE | COMMUNITY
Start: 2023-10-25 | End: 1900-01-01

## 2023-10-26 LAB
INFLUENZA A RESULT: NOT DETECTED
INFLUENZA B RESULT: NOT DETECTED
RESP SYN VIRUS RESULT: DETECTED
SARS-COV-2 RESULT: NOT DETECTED

## 2023-10-27 LAB — BACTERIA THROAT CULT: NORMAL

## 2023-11-01 ENCOUNTER — APPOINTMENT (OUTPATIENT)
Dept: BARIATRICS/WEIGHT MGMT | Facility: CLINIC | Age: 38
End: 2023-11-01
Payer: COMMERCIAL

## 2023-11-01 ENCOUNTER — APPOINTMENT (OUTPATIENT)
Dept: CARDIOLOGY | Facility: CLINIC | Age: 38
End: 2023-11-01
Payer: COMMERCIAL

## 2023-11-01 ENCOUNTER — OUTPATIENT (OUTPATIENT)
Dept: OUTPATIENT SERVICES | Facility: HOSPITAL | Age: 38
LOS: 1 days | End: 2023-11-01
Payer: COMMERCIAL

## 2023-11-01 VITALS
HEART RATE: 94 BPM | HEIGHT: 67 IN | DIASTOLIC BLOOD PRESSURE: 76 MMHG | BODY MASS INDEX: 30.13 KG/M2 | SYSTOLIC BLOOD PRESSURE: 113 MMHG | WEIGHT: 192 LBS | OXYGEN SATURATION: 100 %

## 2023-11-01 DIAGNOSIS — K08.491 PARTIAL LOSS OF TEETH DUE TO OTHER SPECIFIED CAUSE, CLASS I: Chronic | ICD-10-CM

## 2023-11-01 PROCEDURE — 99214 OFFICE O/P EST MOD 30 MIN: CPT | Mod: 25

## 2023-11-01 PROCEDURE — G0463: CPT

## 2023-11-01 PROCEDURE — 99213 OFFICE O/P EST LOW 20 MIN: CPT | Mod: 95

## 2023-11-01 PROCEDURE — 93000 ELECTROCARDIOGRAM COMPLETE: CPT

## 2023-11-01 RX ORDER — CLOTRIMAZOLE AND BETAMETHASONE DIPROPIONATE 10; .5 MG/G; MG/G
1-0.05 CREAM TOPICAL 3 TIMES DAILY
Qty: 1 | Refills: 1 | Status: DISCONTINUED | COMMUNITY
Start: 2023-09-20 | End: 2023-11-01

## 2023-11-01 RX ORDER — ATENOLOL AND CHLORTHALIDONE 100; 25 MG/1; MG/1
100-25 TABLET ORAL
Qty: 90 | Refills: 3 | Status: ACTIVE | COMMUNITY
Start: 2022-02-14

## 2023-11-02 DIAGNOSIS — I10 ESSENTIAL (PRIMARY) HYPERTENSION: ICD-10-CM

## 2023-11-07 DIAGNOSIS — E66.9 OBESITY, UNSPECIFIED: ICD-10-CM

## 2023-11-15 ENCOUNTER — APPOINTMENT (OUTPATIENT)
Dept: BARIATRICS/WEIGHT MGMT | Facility: CLINIC | Age: 38
End: 2023-11-15
Payer: COMMERCIAL

## 2023-11-15 VITALS — BODY MASS INDEX: 29.66 KG/M2 | HEIGHT: 67 IN | WEIGHT: 189 LBS

## 2023-11-15 PROCEDURE — 97803 MED NUTRITION INDIV SUBSEQ: CPT

## 2023-12-27 ENCOUNTER — APPOINTMENT (OUTPATIENT)
Dept: BARIATRICS/WEIGHT MGMT | Facility: CLINIC | Age: 38
End: 2023-12-27
Payer: COMMERCIAL

## 2023-12-27 VITALS — HEIGHT: 67 IN | BODY MASS INDEX: 30.29 KG/M2 | WEIGHT: 193 LBS

## 2023-12-27 PROCEDURE — 97803 MED NUTRITION INDIV SUBSEQ: CPT

## 2024-01-01 NOTE — ASSESSMENT
[FreeTextEntry1] : 36 y/o  presents for colposcopy due to h/o abnormal PAP smear. ASCUS PAP on 3/3023, LSIL PAP 3/2022. Pt had mammo/sono in 2023 that was BIRADS-3, indicated oval medial left breast subdermal abscess, which she has since been draining by herself.
EOAE (evoked otoacoustic emission)

## 2024-01-04 ENCOUNTER — APPOINTMENT (OUTPATIENT)
Dept: INTERNAL MEDICINE | Facility: CLINIC | Age: 39
End: 2024-01-04
Payer: COMMERCIAL

## 2024-01-04 ENCOUNTER — NON-APPOINTMENT (OUTPATIENT)
Age: 39
End: 2024-01-04

## 2024-01-04 VITALS
SYSTOLIC BLOOD PRESSURE: 115 MMHG | BODY MASS INDEX: 30.45 KG/M2 | HEART RATE: 95 BPM | DIASTOLIC BLOOD PRESSURE: 80 MMHG | HEIGHT: 67 IN | TEMPERATURE: 98.4 F | WEIGHT: 194 LBS | OXYGEN SATURATION: 97 %

## 2024-01-04 DIAGNOSIS — J01.90 ACUTE SINUSITIS, UNSPECIFIED: ICD-10-CM

## 2024-01-04 DIAGNOSIS — U07.1 COVID-19: ICD-10-CM

## 2024-01-04 PROCEDURE — 99213 OFFICE O/P EST LOW 20 MIN: CPT

## 2024-01-04 RX ORDER — FLUTICASONE PROPIONATE 50 UG/1
50 SPRAY, METERED NASAL DAILY
Qty: 1 | Refills: 0 | Status: ACTIVE | COMMUNITY
Start: 2024-01-04 | End: 1900-01-01

## 2024-01-04 RX ORDER — AZITHROMYCIN 250 MG/1
250 TABLET, FILM COATED ORAL
Qty: 1 | Refills: 0 | Status: ACTIVE | COMMUNITY
Start: 2024-01-04 | End: 1900-01-01

## 2024-01-06 LAB
INFLUENZA A RESULT: NOT DETECTED
INFLUENZA B RESULT: NOT DETECTED
RESP SYN VIRUS RESULT: NOT DETECTED
SARS-COV-2 RESULT: DETECTED

## 2024-01-11 ENCOUNTER — APPOINTMENT (OUTPATIENT)
Dept: SURGICAL ONCOLOGY | Facility: CLINIC | Age: 39
End: 2024-01-11

## 2024-01-18 ENCOUNTER — APPOINTMENT (OUTPATIENT)
Dept: SURGICAL ONCOLOGY | Facility: CLINIC | Age: 39
End: 2024-01-18
Payer: COMMERCIAL

## 2024-01-18 VITALS
WEIGHT: 193 LBS | SYSTOLIC BLOOD PRESSURE: 135 MMHG | DIASTOLIC BLOOD PRESSURE: 87 MMHG | HEART RATE: 72 BPM | RESPIRATION RATE: 16 BRPM | BODY MASS INDEX: 30.29 KG/M2 | OXYGEN SATURATION: 97 % | HEIGHT: 67 IN

## 2024-01-18 PROCEDURE — 99214 OFFICE O/P EST MOD 30 MIN: CPT

## 2024-02-08 NOTE — HISTORY OF PRESENT ILLNESS
[Cold Symptoms] : cold symptoms [Moderate] : moderate [___ Days ago] : [unfilled] days ago [Constant] : constant [Congestion] : congestion [Cough] : cough [Sore Throat] : sore throat [Wheezing] : no wheezing [Chills] : chills [Shortness Of Breath] : no shortness of breath [Fatigue] : fatigue [Headache] : headache [Fever] : no fever [OTC Remedies] : OTC remedies [Stable] : stable

## 2024-02-08 NOTE — PHYSICAL EXAM
[Normal] : affect was normal and insight and judgment were intact [de-identified] : scattered rare rhonchi/ no wheeze

## 2024-02-14 ENCOUNTER — APPOINTMENT (OUTPATIENT)
Dept: BARIATRICS/WEIGHT MGMT | Facility: CLINIC | Age: 39
End: 2024-02-14
Payer: COMMERCIAL

## 2024-02-14 VITALS — WEIGHT: 192 LBS | HEIGHT: 67 IN | BODY MASS INDEX: 30.13 KG/M2

## 2024-02-14 PROCEDURE — 97803 MED NUTRITION INDIV SUBSEQ: CPT | Mod: 95

## 2024-02-26 DIAGNOSIS — N64.4 MASTODYNIA: ICD-10-CM

## 2024-03-12 ENCOUNTER — APPOINTMENT (OUTPATIENT)
Dept: SURGICAL ONCOLOGY | Facility: CLINIC | Age: 39
End: 2024-03-12
Payer: COMMERCIAL

## 2024-03-12 VITALS
WEIGHT: 190 LBS | HEART RATE: 76 BPM | OXYGEN SATURATION: 98 % | SYSTOLIC BLOOD PRESSURE: 115 MMHG | BODY MASS INDEX: 29.82 KG/M2 | DIASTOLIC BLOOD PRESSURE: 82 MMHG | HEIGHT: 67 IN | RESPIRATION RATE: 17 BRPM

## 2024-03-12 DIAGNOSIS — N63.20 UNSPECIFIED LUMP IN THE LEFT BREAST, UNSPECIFIED QUADRANT: ICD-10-CM

## 2024-03-12 PROCEDURE — 99213 OFFICE O/P EST LOW 20 MIN: CPT

## 2024-03-20 ENCOUNTER — APPOINTMENT (OUTPATIENT)
Dept: BARIATRICS/WEIGHT MGMT | Facility: CLINIC | Age: 39
End: 2024-03-20
Payer: COMMERCIAL

## 2024-03-20 VITALS
HEIGHT: 60 IN | WEIGHT: 188 LBS | WEIGHT: 188 LBS | HEIGHT: 67 IN | BODY MASS INDEX: 36.91 KG/M2 | BODY MASS INDEX: 29.51 KG/M2

## 2024-03-20 PROCEDURE — 97803 MED NUTRITION INDIV SUBSEQ: CPT | Mod: 95

## 2024-03-25 ENCOUNTER — TRANSCRIPTION ENCOUNTER (OUTPATIENT)
Age: 39
End: 2024-03-25

## 2024-04-02 ENCOUNTER — APPOINTMENT (OUTPATIENT)
Dept: SURGICAL ONCOLOGY | Facility: CLINIC | Age: 39
End: 2024-04-02
Payer: COMMERCIAL

## 2024-04-02 VITALS
BODY MASS INDEX: 29.51 KG/M2 | OXYGEN SATURATION: 99 % | HEART RATE: 84 BPM | DIASTOLIC BLOOD PRESSURE: 90 MMHG | HEIGHT: 67 IN | SYSTOLIC BLOOD PRESSURE: 140 MMHG | WEIGHT: 188 LBS

## 2024-04-02 DIAGNOSIS — N61.20 GRANULOMATOUS MASTITIS, UNSPECIFIED BREAST: ICD-10-CM

## 2024-04-02 PROCEDURE — 99212 OFFICE O/P EST SF 10 MIN: CPT

## 2024-04-09 NOTE — ASSESSMENT
[FreeTextEntry1] : LYNN ALAS is a 38-year F w/ fibrocystic breast, s/p resolution of L 10N2 abscess. Here for follow-up.   On clinical exam and bedside ultrasound, area of concern is consistent with an island of dense breast tissue. We discussed a short course of anti-inflammatory meds: Ibuprofen (Motrin) 200mg Po BID x 4 days to decrease inflammation and instructed her to stop breast exams to prevent trauma to area.   - Next imaging: Left Dx MG/US Now, BL SC MG/US due 4/2024 - RTO in 1 month. She knows to return sooner if any breast abnormalities or if any breast issues/concerns arise.

## 2024-04-09 NOTE — PHYSICAL EXAM
[Normal] : supple, no neck mass and thyroid not enlarged [Normal Neck Lymph Nodes] : normal neck lymph nodes  [Normal Supraclavicular Lymph Nodes] : normal supraclavicular lymph nodes [Normal Axillary Lymph Nodes] : normal axillary lymph nodes [Normal] : oriented to person, place and time, with appropriate affect [de-identified] : Left breast with no erythema or induration.  Prior punctate has closed.   No left or right breast masses.  No clinical lymphadenopathy.   [de-identified] : Normal respiratory effort

## 2024-04-09 NOTE — HISTORY OF PRESENT ILLNESS
[de-identified] : LYNN ALAS is a 38-year F w/ fibrocystic breast, s/p resolution of L 10N2 abscess. Here for follow-up.   Initial H&P:  Reports noticing Left breast changes at beginning of February and end of march, no fever or chills at that time. Saw GYN in Late March at that time area turned slightly pink and started to look like a bruise in April. when to see PCP in late April and was started on Cephalexin 500 mg QID on 4/29/23 - 6 days on antibiotic. Patient reports area is improving but .  Significant Family Hx: Non-Hodgkin lymphoma (Maternal Uncle unknown age of diagnosis)  PMHx: Chronic HTN dx 2016, pre-dm, HLD (Metabolic syndrome), Obesity   5/11/23 Since last visit has completed her antibiotic regiment and returns with no complaints - Wound is healing well.  5/30/23 No new complaints, Denies any fever or chills. States there continues to be a pinpoint opening.  1/18/24 Since last visit has not scheduled imaging due 12/2023. Reports resolution of previous left 10:00 abscess but noted a left 12:00 mass in 11/2023 that was asymptomatic but recently has become tender to palpation. Denies any trauma to area. No fevers or chills.

## 2024-04-23 ENCOUNTER — APPOINTMENT (OUTPATIENT)
Dept: OBGYN | Facility: CLINIC | Age: 39
End: 2024-04-23
Payer: COMMERCIAL

## 2024-04-23 ENCOUNTER — LABORATORY RESULT (OUTPATIENT)
Age: 39
End: 2024-04-23

## 2024-04-23 VITALS
HEART RATE: 82 BPM | SYSTOLIC BLOOD PRESSURE: 134 MMHG | WEIGHT: 191 LBS | HEIGHT: 67 IN | DIASTOLIC BLOOD PRESSURE: 91 MMHG | BODY MASS INDEX: 29.98 KG/M2

## 2024-04-23 DIAGNOSIS — Z01.419 ENCOUNTER FOR GYNECOLOGICAL EXAMINATION (GENERAL) (ROUTINE) W/OUT ABNORMAL FINDINGS: ICD-10-CM

## 2024-04-23 DIAGNOSIS — Z11.3 ENCOUNTER FOR SCREENING FOR INFECTIONS WITH A PREDOMINANTLY SEXUAL MODE OF TRANSMISSION: ICD-10-CM

## 2024-04-23 PROCEDURE — 36415 COLL VENOUS BLD VENIPUNCTURE: CPT

## 2024-04-23 PROCEDURE — 99396 PREV VISIT EST AGE 40-64: CPT

## 2024-04-23 NOTE — HISTORY OF PRESENT ILLNESS
[FreeTextEntry1] : 37y/o presents for annual exam. Pt reports frequent urination. Pt reports bad peeing habit; holding pee at work, states it isnt bothersome to her. Denies any new PMSH. Desires STI testing.  Pt also reports clear discharge, which she has had her whole life. Pt is not sexually active.    SHx: teacher

## 2024-04-23 NOTE — PLAN
[FreeTextEntry1] : 39 y/o presenting for annual exam.   - PAP/HPV done  - STD testing done  -UTD on mammo -colonoscopy at 43 rto in 1 year  Ellie Holloway MD      -

## 2024-04-23 NOTE — END OF VISIT
[FreeTextEntry3] : I, Dalila Florian, acted as a scribe on behalf of Dr. Ellie Holloway on 04/23/2024.   All medical entries made by the scribe were at my, Dr. Ellie Holloway, direction and personally dictated by me on 04/23/2024 . I have reviewed the chart and agree that the record accurately reflects my personal performance of the history, physical exam, assessment and plan. I have also personally directed, reviewed, and agreed with the chart.

## 2024-04-24 LAB
HBV SURFACE AG SER QL: NONREACTIVE
HIV1+2 AB SPEC QL IA.RAPID: NONREACTIVE
HPV HIGH+LOW RISK DNA PNL CVX: NOT DETECTED
T PALLIDUM AB SER QL IA: NEGATIVE

## 2024-04-27 LAB — CYTOLOGY CVX/VAG DOC THIN PREP: ABNORMAL

## 2024-05-01 ENCOUNTER — APPOINTMENT (OUTPATIENT)
Dept: BARIATRICS/WEIGHT MGMT | Facility: CLINIC | Age: 39
End: 2024-05-01
Payer: COMMERCIAL

## 2024-05-01 ENCOUNTER — OUTPATIENT (OUTPATIENT)
Dept: OUTPATIENT SERVICES | Facility: HOSPITAL | Age: 39
LOS: 1 days | End: 2024-05-01
Payer: COMMERCIAL

## 2024-05-01 VITALS — BODY MASS INDEX: 29.82 KG/M2 | HEIGHT: 67 IN | WEIGHT: 190 LBS

## 2024-05-01 DIAGNOSIS — K08.491 PARTIAL LOSS OF TEETH DUE TO OTHER SPECIFIED CAUSE, CLASS I: Chronic | ICD-10-CM

## 2024-05-01 PROCEDURE — G0463: CPT

## 2024-05-01 PROCEDURE — 99213 OFFICE O/P EST LOW 20 MIN: CPT | Mod: 95

## 2024-05-01 RX ORDER — TIRZEPATIDE 5 MG/.5ML
5 INJECTION, SOLUTION SUBCUTANEOUS
Qty: 1 | Refills: 5 | Status: ACTIVE | COMMUNITY
Start: 2024-05-01 | End: 1900-01-01

## 2024-05-01 NOTE — ASSESSMENT
[FreeTextEntry1] : BARIATRIC SURGERY HISTORY: none  OBESITY COMORBIDITIES:  HLD, pre-dm, met-s  ANTI-OBESITY MEDICATIONS: was tirzepatide and switched to semaglutide for insurance purposes  OBESITY MEDICATION SIDE EFFECTS: none    recommend the following:  switch to zepbound  cont working with RD or NP on dietary modification - higher fiber, more water increase physical activity when available cont with sleep hygeine  rtc in 6-8 weeks

## 2024-05-01 NOTE — HISTORY OF PRESENT ILLNESS
[FreeTextEntry1] : 36 yo woman with obesity, pre-dm, HTN, HLD (metabolic syndrome) presents for initial obesity medicine eval.  Weight remains = 190 lbs (down 33 lbs from max and 2 lbs down from last visit in 11/2023) height = 5'7  Tolerating wegovy 2.4mg without ill effect but feels it is not working as well as tirzepatide when she was on it less exercise now as schedule is hectic getting better sleep of late has been working regularly with RD on nutrition    otherwise without new complaints today

## 2024-05-01 NOTE — REASON FOR VISIT
[Other Location: e.g. School (Enter Location, City,State)___] : at [unfilled], at the time of the visit. [Other Location: e.g. Home (Enter Location, City,State)___] : at [unfilled] [Patient] : the patient [Follow-Up] : a follow-up visit [FreeTextEntry1] : obesity

## 2024-05-02 DIAGNOSIS — I10 ESSENTIAL (PRIMARY) HYPERTENSION: ICD-10-CM

## 2024-05-03 ENCOUNTER — APPOINTMENT (OUTPATIENT)
Dept: CARDIOLOGY | Facility: CLINIC | Age: 39
End: 2024-05-03
Payer: COMMERCIAL

## 2024-05-03 VITALS
BODY MASS INDEX: 29.82 KG/M2 | HEIGHT: 67 IN | HEART RATE: 88 BPM | WEIGHT: 190 LBS | OXYGEN SATURATION: 96 % | SYSTOLIC BLOOD PRESSURE: 107 MMHG | DIASTOLIC BLOOD PRESSURE: 76 MMHG

## 2024-05-03 DIAGNOSIS — E78.5 HYPERLIPIDEMIA, UNSPECIFIED: ICD-10-CM

## 2024-05-03 DIAGNOSIS — I10 ESSENTIAL (PRIMARY) HYPERTENSION: ICD-10-CM

## 2024-05-03 DIAGNOSIS — Z00.00 ENCOUNTER FOR GENERAL ADULT MEDICAL EXAMINATION W/OUT ABNORMAL FINDINGS: ICD-10-CM

## 2024-05-03 PROCEDURE — 93010 ELECTROCARDIOGRAM REPORT: CPT

## 2024-05-03 PROCEDURE — 99214 OFFICE O/P EST MOD 30 MIN: CPT | Mod: 25

## 2024-05-03 RX ORDER — ATENOLOL AND CHLORTHALIDONE 100; 25 MG/1; MG/1
100-25 TABLET ORAL
Qty: 90 | Refills: 3 | Status: ACTIVE | COMMUNITY
Start: 2023-11-01 | End: 1900-01-01

## 2024-05-04 NOTE — DISCUSSION/SUMMARY
[EKG obtained to assist in diagnosis and management of assessed problem(s)] : EKG obtained to assist in diagnosis and management of assessed problem(s) [FreeTextEntry1] : Taryn Hale is a 35 year old  with a history of  chronic hypertension diagnosed in  2016 (on propranolol and HCTZ) presents in for follow up. Currently not pregnant. On Wegovy lost about 30 lbs.  Denies any issues with shortness of breath, palpitations or chest discomfort.  Stress test - normal, TTE /2021 Normal LV systolic function No segmental wall motion abnormalities  # HLD: TG 72, , HDL 37,  Encouraged patient to continue healthy exercise and eating habits, focusing on a Mediterranean style of eating and aiming for the recommended 150 minutes per week of moderate physical activity. Plans to consider time restricted feeding  Repeat Lipid panel   # HTN: BP Stable Continue Atenolol- Chlorthalidone 100- 25 Encouraged Patient to monitor BP at home, keep a log, and report results back to us for evaluation. Based on the results, we will adjust medications as necessary. Additionally, encouraged a heart-healthy diet and exercise as tolerated. EKG with no acute changes.  # Routine labs ordered - CBC, CMP, LIPIDS, TSH, Vit D, A1C

## 2024-05-04 NOTE — END OF VISIT
[Time Spent: ___ minutes] : I have spent [unfilled] minutes of time on the encounter. [FreeTextEntry3] : I, Dr. Kira Orantes, personally performed the evaluation and management (E/M) services for this established patient who presents today with (a) new problem(s)/exacerbation of (an) existing condition(s).  That E/M includes conducting the examination, assessing all new/exacerbated conditions, and establishing a new plan of care.  Today, my ACP, was here to observe my evaluation and management services for this new problem/exacerbated condition to be followed going forward.

## 2024-05-04 NOTE — HISTORY OF PRESENT ILLNESS
[FreeTextEntry1] : Taryn Hale is a 35 year old  with a history of  chronic hypertension diagnosed in  2016 (on propranolol and HCTZ) presents in for follow up. Currently not pregnant. On Wegovy lost about 30 lbs. Denies any issues with shortness of breath, palpitations or chest discomfort.  Blood work - , , HDL 38,  Stress test - normal, TTE /2021 Normal LV systolic function No segmental wall motion abnormalities  # HLD: TG 72, , HDL 37,  Encouraged patient to continue healthy exercise and eating habits, focusing on a Mediterranean style of eating and aiming for the recommended 150 minutes per week of moderate physical activity. Plans to consider time restricted feeding  Repeat Lipid panel   # HTN: BP Stable Continue Atenolol- Chlorthalidone 100- 25 Encouraged Patient to monitor BP at home, keep a log, and report results back to us for evaluation. Based on the results, we will adjust medications as necessary. Additionally, encouraged a heart-healthy diet and exercise as tolerated. EKG with no acute changes.  # Routine labs ordered - CBC, CMP, LIPIDS, TSH, Vit D, A1C

## 2024-05-07 ENCOUNTER — TRANSCRIPTION ENCOUNTER (OUTPATIENT)
Age: 39
End: 2024-05-07

## 2024-05-07 DIAGNOSIS — E66.9 OBESITY, UNSPECIFIED: ICD-10-CM

## 2024-05-07 NOTE — PHYSICAL EXAM
[Normal] : supple, no neck mass and thyroid not enlarged [Normal Neck Lymph Nodes] : normal neck lymph nodes  [Normal Supraclavicular Lymph Nodes] : normal supraclavicular lymph nodes [Normal Axillary Lymph Nodes] : normal axillary lymph nodes [Normal] : oriented to person, place and time, with appropriate affect [de-identified] : Left breast with no erythema. palpable 2.5 cm mass at left 12N7 and nodularity at 7N4.  Prior punctate has closed.   No other left or right breast masses.  No clinical lymphadenopathy.   [de-identified] : Normal respiratory effort

## 2024-05-07 NOTE — HISTORY OF PRESENT ILLNESS
[de-identified] : LYNN ALAS is a 38-year F w/ fibrocystic breast, s/p resolution of L 10N2 abscess. Here for 2-month-follow-up, recent imaging rated BR 4.    Initial H&P:  Reports noticing Left breast changes at beginning of February and end of march, no fever or chills at that time. Saw GYN in Late March at that time area turned slightly pink and started to look like a bruise in April. when to see PCP in late April and was started on Cephalexin 500 mg QID on 4/29/23 - 6 days on antibiotic. Patient reports area is improving but .  Significant Family Hx: Non-Hodgkin lymphoma (Maternal Uncle unknown age of diagnosis)  PMHx: Chronic HTN dx 2016, pre-dm, HLD (Metabolic syndrome), Obesity   5/11/23 Since last visit has completed her antibiotic regiment and returns with no complaints - Wound is healing well.  5/30/23 No new complaints, Denies any fever or chills. States there continues to be a pinpoint opening.   1/18/24 Since last visit has not scheduled imaging due 12/2023. Reports resolution of previous left 10:00 abscess but noted a left 12:00 mass in 11/2023 that was asymptomatic but recently has become tender to palpation. Denies any trauma to area. No fevers or chills. On clinical exam & bedside ultrasound, area of concern is c/w an island of dense breast tissue. We discussed a short course of anti-inflammatory meds and instructed her to stop breast exams to prevent trauma to area.  3/12/24. Patient states she feels a palpable lump over her left central breast which is currently non-tender. Reports no open wounds or drainage. No fevers or chills.

## 2024-05-07 NOTE — ASSESSMENT
[FreeTextEntry1] : LYNN ALAS is a 38-year F w/ fibrocystic breast, s/p resolution of L 10N2 abscess. Here for 2-month-follow-up, recent imaging rated BR 4.    Recent breast imaging rated BR 4, for probable developing abscess in the L 12-1:00N 7 2.2 cm lobulate hypoechoic mass at clinical area of concern and left new focal asymmetry.   On clinical exam there is a palpable 2.5 cm mass at left 12N7 and nodule at Left 7N4.   - Next imaging: Left Dx US ordered STAT to further evaluate palpable mass at Left 7N4, Left US guided bx of 12N7 mass.  Patient states she will get imaging done at [facility] and will contact the office.  - Screening: BL SC MG/US due 2/2025.  - RTO 1 week after biopsy. She knows to return sooner if any breast abnormalities or if any breast issues/concerns arise.

## 2024-05-07 NOTE — RESULTS/DATA
[FreeTextEntry1] : BREAST PATH/RAD REVIEW [facility] Radiology: 4/27/2023 BL Dx MG/US: BIRADs 3, Heterogenously dense, - L 10N2 3.8 cm oval subdermal hypoechoic mass w/ some peripheral vascularity and overlying skin thickening most c/w breast abscess. Similar adjacent masses measuring 1.4 and 0.5 cm deep and medial to the primary lesion (Rec 2-month f/u L Dx US after appropriate Tx)  6/29/23 L Dx US: BIRADs 2. Significant decrease in size of dominant superficial 10:00 abscess w, stable size of adjacent deeper abscesses. (Rec clinical f/u).   2/27/24 BL Dx MG/US: BIRADs 4.  Heterogeneously dense.  - New L superior region posterior depth focal asymmetry c/w area of clinical concern that correlates w/ new L 12-1:00N7 2.2 cm lobulated hypoechoic mass c/f developing abscess.  - Prior subdermal focal asymmetry w/ infiltration of the surrounding adipose tissue that has diminished.   - Prior L 10N2 subdermal abscess no longer seen.  - Prior L 10N2 deeper abscess measuring 1.1 cm (1.3 cm prior) decreased in size, prior adjacent 4 mm (5 mm) oval abscess decreased in size.

## 2024-05-12 LAB — CORE LAB BIOPSY: NORMAL

## 2024-05-13 ENCOUNTER — TRANSCRIPTION ENCOUNTER (OUTPATIENT)
Age: 39
End: 2024-05-13

## 2024-06-06 ENCOUNTER — OUTPATIENT (OUTPATIENT)
Dept: OUTPATIENT SERVICES | Facility: HOSPITAL | Age: 39
LOS: 1 days | End: 2024-06-06
Payer: COMMERCIAL

## 2024-06-06 ENCOUNTER — APPOINTMENT (OUTPATIENT)
Dept: BARIATRICS/WEIGHT MGMT | Facility: CLINIC | Age: 39
End: 2024-06-06
Payer: COMMERCIAL

## 2024-06-06 DIAGNOSIS — I10 ESSENTIAL (PRIMARY) HYPERTENSION: ICD-10-CM

## 2024-06-06 DIAGNOSIS — E66.9 OBESITY, UNSPECIFIED: ICD-10-CM

## 2024-06-06 DIAGNOSIS — R73.03 PREDIABETES.: ICD-10-CM

## 2024-06-06 PROCEDURE — 99213 OFFICE O/P EST LOW 20 MIN: CPT | Mod: 95

## 2024-06-06 PROCEDURE — G0463: CPT

## 2024-06-07 ENCOUNTER — RX RENEWAL (OUTPATIENT)
Age: 39
End: 2024-06-07

## 2024-06-07 PROBLEM — R73.03 PREDIABETES: Status: ACTIVE | Noted: 2023-01-27

## 2024-06-07 PROBLEM — E66.9 ADULT-ONSET OBESITY: Status: ACTIVE | Noted: 2023-01-28

## 2024-06-07 RX ORDER — SEMAGLUTIDE 2.4 MG/.75ML
2.4 INJECTION, SOLUTION SUBCUTANEOUS
Qty: 3 | Refills: 1 | Status: ACTIVE | COMMUNITY
Start: 2023-09-20 | End: 1900-01-01

## 2024-06-07 NOTE — ASSESSMENT
[FreeTextEntry1] : BARIATRIC SURGERY HISTORY: none  OBESITY COMORBIDITIES:  HLD, pre-dm, met-s  ANTI-OBESITY MEDICATIONS: was tirzepatide and switched to semaglutide for insurance purposes  OBESITY MEDICATION SIDE EFFECTS: none    recommend the following:  switch to zepbound once in stock Discussed focusing on adding more fiber, regular healthy breakfast. Sent nutritional resources.  increase physical activity when available cont with sleep hygeine  rtc in 1 month w/Dr Londono and 2 months w/me.

## 2024-06-07 NOTE — REASON FOR VISIT
[Home] : at home, [unfilled] , at the time of the visit. [Medical Office: ([facility])___] : at the medical office located in  [Patient] : the patient [Follow-Up] : a follow-up visit [FreeTextEntry1] : obesity

## 2024-06-07 NOTE — HISTORY OF PRESENT ILLNESS
[FreeTextEntry1] : 37 yo woman with obesity, pre-dm, HTN, HLD (metabolic syndrome) presents for obesity medicine follow up.   Weight remains = 190 lbs (down 33 lbs from max and 2 lbs down from last visit in 11/2023) height = 5'7  Interim hx: -pt new to this provider. Reviewed history w/pt and in chart.  -referred by Dr Londono for dietary modification/assistance.  -on wegovy 2.4mg , no side effects, less effect over time on appetite.  -goal is to formulate better eating habits. Works as a teacher.  -often skips breakfast in AM. If eating will have Cereal (cheerios) or boiled egg and fruit -has worked w/[employer]HLY[employer] in the past.  -summer has more time for healthy habits and plans to start exercising more regularly and preparing more food.   otherwise without new complaints today

## 2024-06-11 DIAGNOSIS — R73.03 PREDIABETES: ICD-10-CM

## 2024-06-11 DIAGNOSIS — E66.9 OBESITY, UNSPECIFIED: ICD-10-CM

## 2024-06-24 PROBLEM — N61.20: Status: ACTIVE | Noted: 2024-06-24

## 2024-06-24 NOTE — RESULTS/DATA
[FreeTextEntry1] : BREAST PATH/RAD REVIEW [facility] Radiology: 4/27/2023 BL Dx MG/US: BIRADs 3, Heterogenously dense, - L 10N2 3.8 cm oval subdermal hypoechoic mass w/ some peripheral vascularity and overlying skin thickening most c/w breast abscess. Similar adjacent masses measuring 1.4 and 0.5 cm deep and medial to the primary lesion (Rec 2-month f/u L Dx US after appropriate Tx)  6/29/23 L Dx US: BIRADs 2. Significant decrease in size of dominant superficial 10:00 abscess w, stable size of adjacent deeper abscesses. (Rec clinical f/u).   2/27/24 BL Dx MG/US: BIRADs 4.  Heterogeneously dense.  - New L superior region posterior depth focal asymmetry c/w area of clinical concern that correlates w/ new L 12-1:00N7 2.2 cm lobulated hypoechoic mass c/f developing abscess.  - Prior subdermal focal asymmetry w/ infiltration of the surrounding adipose tissue that has diminished.   - Prior L 10N2 subdermal abscess no longer seen.  - Prior L 10N2 deeper abscess measuring 1.1 cm (1.3 cm prior) decreased in size, prior adjacent 4 mm (5 mm) oval abscess decreased in size.   3/20/24 Left [12-1:00N7] Mastitis w/ acute and chronic inflammation including granulomatous inflammation and giant cells. Coil Clip. Benign & concordant.  No definitive microorganism, fungi or mycobacteria on stains for Gram, GMS and AFB.   Non-necrotizing ill-defined lobulocentric granuloma and rare multinucleated histiocytic giant cells noted. CNGM is ddx.

## 2024-06-24 NOTE — ASSESSMENT
[FreeTextEntry1] : LYNN ALAS is a 38-year F w/ fibrocystic breast, s/p resolution of L 10N2 abscess. L 12-1:00N7 2.2 cm lobulated mass corresponding to focal asymmetry on MG s/p core bx yielding GM.   Clinical breast exam benign, patient asymptomatic.   - Screening: BL SC MG/US due 2/2025.  - She will resume her annual breast imaging surveillance with her PCP/GYN and she will return to see me if any breast imaging abnormalities or new breast concerns arise.

## 2024-06-24 NOTE — PHYSICAL EXAM
[Normal] : supple, no neck mass and thyroid not enlarged [Normal Neck Lymph Nodes] : normal neck lymph nodes  [Normal Supraclavicular Lymph Nodes] : normal supraclavicular lymph nodes [Normal Axillary Lymph Nodes] : normal axillary lymph nodes [Normal] : oriented to person, place and time, with appropriate affect [de-identified] : Normal respiratory effort [de-identified] : Left breast with no erythema. Previously palpable 2.5 cm mass at left 12N7 and nodularity at 7N4 have diminished.  Prior punctate has closed.   No other left or right breast masses.  No clinical lymphadenopathy.

## 2024-07-10 ENCOUNTER — OUTPATIENT (OUTPATIENT)
Dept: OUTPATIENT SERVICES | Facility: HOSPITAL | Age: 39
LOS: 1 days | End: 2024-07-10
Payer: COMMERCIAL

## 2024-07-10 ENCOUNTER — APPOINTMENT (OUTPATIENT)
Dept: BARIATRICS/WEIGHT MGMT | Facility: CLINIC | Age: 39
End: 2024-07-10
Payer: COMMERCIAL

## 2024-07-10 DIAGNOSIS — K08.491 PARTIAL LOSS OF TEETH DUE TO OTHER SPECIFIED CAUSE, CLASS I: Chronic | ICD-10-CM

## 2024-07-10 DIAGNOSIS — I10 ESSENTIAL (PRIMARY) HYPERTENSION: ICD-10-CM

## 2024-07-10 DIAGNOSIS — E66.9 OBESITY, UNSPECIFIED: ICD-10-CM

## 2024-07-10 PROCEDURE — G0463: CPT

## 2024-07-10 PROCEDURE — 99213 OFFICE O/P EST LOW 20 MIN: CPT | Mod: 95

## 2024-07-15 DIAGNOSIS — E66.9 OBESITY, UNSPECIFIED: ICD-10-CM

## 2024-08-09 ENCOUNTER — OUTPATIENT (OUTPATIENT)
Dept: OUTPATIENT SERVICES | Facility: HOSPITAL | Age: 39
LOS: 1 days | End: 2024-08-09
Payer: COMMERCIAL

## 2024-08-09 ENCOUNTER — APPOINTMENT (OUTPATIENT)
Dept: BARIATRICS/WEIGHT MGMT | Facility: CLINIC | Age: 39
End: 2024-08-09

## 2024-08-09 DIAGNOSIS — K08.491 PARTIAL LOSS OF TEETH DUE TO OTHER SPECIFIED CAUSE, CLASS I: Chronic | ICD-10-CM

## 2024-08-09 DIAGNOSIS — I10 ESSENTIAL (PRIMARY) HYPERTENSION: ICD-10-CM

## 2024-08-09 PROCEDURE — 99214 OFFICE O/P EST MOD 30 MIN: CPT | Mod: 95

## 2024-08-09 PROCEDURE — G0463: CPT

## 2024-08-09 NOTE — HISTORY OF PRESENT ILLNESS
[FreeTextEntry1] : 37 yo woman with obesity, pre-dm, HTN, HLD (metabolic syndrome) presents for obesity medicine follow up.   Weight remains = 200 lbs (down 23 lbs from max) height = 5'7  Interim hx: -on wegovy 2.4mg , no side effects, feels not curbing her appetite. Recent wt gain of 10 lbs over past 2-3 months on meds.  -has not been able to switch back to tirzepatide due to drug shortages, did better on Mounjaro -recent more cravings for sugar. At times having soda and candy.  -off for the summer.  Walking dogs and staying active with walking but has not done any formal exercise.  -during summer has been eating 3 meals/day.  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  -sleep and stress ok. 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  -looking for healthy and easy meal ideas.   otherwise without new complaints today

## 2024-08-09 NOTE — ASSESSMENT
[FreeTextEntry1] : BARIATRIC SURGERY HISTORY: none  OBESITY COMORBIDITIES:  HLD, pre-dm, met-s  ANTI-OBESITY MEDICATIONS: was tirzepatide and switched to semaglutide for insurance purposes  OBESITY MEDICATION SIDE EFFECTS: none    recommend the following: switch to zepbound 7.5mg once in stock. Resent rx Discussed focusing on adding more fiber, regular healthy breakfast. Sent nutritional resources. Easy meal ideas.  increase physical activity w/goal setting and intensity/duration.  cont with sleep hygeine  rtc in 1 month w/Dr Londono and 2 months w/me.

## 2024-08-19 DIAGNOSIS — E66.9 OBESITY, UNSPECIFIED: ICD-10-CM

## 2024-08-19 DIAGNOSIS — R73.03 PREDIABETES: ICD-10-CM

## 2024-09-19 ENCOUNTER — EMERGENCY (EMERGENCY)
Facility: HOSPITAL | Age: 39
LOS: 1 days | Discharge: HOME | End: 2024-09-19
Attending: EMERGENCY MEDICINE
Payer: COMMERCIAL

## 2024-09-19 VITALS
SYSTOLIC BLOOD PRESSURE: 144 MMHG | DIASTOLIC BLOOD PRESSURE: 98 MMHG | HEART RATE: 92 BPM | RESPIRATION RATE: 16 BRPM | OXYGEN SATURATION: 100 %

## 2024-09-19 VITALS
HEART RATE: 84 BPM | RESPIRATION RATE: 20 BRPM | DIASTOLIC BLOOD PRESSURE: 86 MMHG | TEMPERATURE: 98 F | WEIGHT: 195.11 LBS | SYSTOLIC BLOOD PRESSURE: 137 MMHG | OXYGEN SATURATION: 100 % | HEIGHT: 67 IN

## 2024-09-19 DIAGNOSIS — K08.491 PARTIAL LOSS OF TEETH DUE TO OTHER SPECIFIED CAUSE, CLASS I: Chronic | ICD-10-CM

## 2024-09-19 LAB
ALBUMIN SERPL ELPH-MCNC: 4.5 G/DL — SIGNIFICANT CHANGE UP (ref 3.3–5)
ALP SERPL-CCNC: 87 U/L — SIGNIFICANT CHANGE UP (ref 40–120)
ALT FLD-CCNC: 43 U/L — SIGNIFICANT CHANGE UP (ref 10–45)
ANION GAP SERPL CALC-SCNC: 13 MMOL/L — SIGNIFICANT CHANGE UP (ref 5–17)
APPEARANCE UR: ABNORMAL
APPEARANCE UR: CLEAR — SIGNIFICANT CHANGE UP
AST SERPL-CCNC: 34 U/L — SIGNIFICANT CHANGE UP (ref 10–40)
BACTERIA # UR AUTO: NEGATIVE /HPF — SIGNIFICANT CHANGE UP
BACTERIA # UR AUTO: NEGATIVE /HPF — SIGNIFICANT CHANGE UP
BASOPHILS # BLD AUTO: 0.03 K/UL — SIGNIFICANT CHANGE UP (ref 0–0.2)
BASOPHILS NFR BLD AUTO: 0.5 % — SIGNIFICANT CHANGE UP (ref 0–2)
BILIRUB SERPL-MCNC: 0.3 MG/DL — SIGNIFICANT CHANGE UP (ref 0.2–1.2)
BILIRUB UR-MCNC: NEGATIVE — SIGNIFICANT CHANGE UP
BILIRUB UR-MCNC: NEGATIVE — SIGNIFICANT CHANGE UP
BUN SERPL-MCNC: 13 MG/DL — SIGNIFICANT CHANGE UP (ref 7–23)
CALCIUM SERPL-MCNC: 9.9 MG/DL — SIGNIFICANT CHANGE UP (ref 8.4–10.5)
CAST: 0 /LPF — SIGNIFICANT CHANGE UP (ref 0–4)
CAST: 1 /LPF — SIGNIFICANT CHANGE UP (ref 0–4)
CHLORIDE SERPL-SCNC: 106 MMOL/L — SIGNIFICANT CHANGE UP (ref 96–108)
CO2 SERPL-SCNC: 20 MMOL/L — LOW (ref 22–31)
COLOR SPEC: YELLOW — SIGNIFICANT CHANGE UP
COLOR SPEC: YELLOW — SIGNIFICANT CHANGE UP
CREAT SERPL-MCNC: 0.77 MG/DL — SIGNIFICANT CHANGE UP (ref 0.5–1.3)
DIFF PNL FLD: ABNORMAL
DIFF PNL FLD: ABNORMAL
EGFR: 99 ML/MIN/1.73M2 — SIGNIFICANT CHANGE UP
EGFR: 99 ML/MIN/1.73M2 — SIGNIFICANT CHANGE UP
EOSINOPHIL # BLD AUTO: 0.1 K/UL — SIGNIFICANT CHANGE UP (ref 0–0.5)
EOSINOPHIL NFR BLD AUTO: 1.6 % — SIGNIFICANT CHANGE UP (ref 0–6)
GLUCOSE SERPL-MCNC: 131 MG/DL — HIGH (ref 70–99)
GLUCOSE UR QL: NEGATIVE MG/DL — SIGNIFICANT CHANGE UP
GLUCOSE UR QL: NEGATIVE MG/DL — SIGNIFICANT CHANGE UP
HCG SERPL-ACNC: <2 MIU/ML — SIGNIFICANT CHANGE UP
HCT VFR BLD CALC: 41.9 % — SIGNIFICANT CHANGE UP (ref 34.5–45)
HGB BLD-MCNC: 13.7 G/DL — SIGNIFICANT CHANGE UP (ref 11.5–15.5)
IMM GRANULOCYTES NFR BLD AUTO: 0.3 % — SIGNIFICANT CHANGE UP (ref 0–0.9)
KETONES UR-MCNC: NEGATIVE MG/DL — SIGNIFICANT CHANGE UP
KETONES UR-MCNC: NEGATIVE MG/DL — SIGNIFICANT CHANGE UP
LEUKOCYTE ESTERASE UR-ACNC: NEGATIVE — SIGNIFICANT CHANGE UP
LEUKOCYTE ESTERASE UR-ACNC: NEGATIVE — SIGNIFICANT CHANGE UP
LIDOCAIN IGE QN: 45 U/L — SIGNIFICANT CHANGE UP (ref 7–60)
LYMPHOCYTES # BLD AUTO: 2.38 K/UL — SIGNIFICANT CHANGE UP (ref 1–3.3)
LYMPHOCYTES # BLD AUTO: 38.6 % — SIGNIFICANT CHANGE UP (ref 13–44)
MCHC RBC-ENTMCNC: 30.4 PG — SIGNIFICANT CHANGE UP (ref 27–34)
MCHC RBC-ENTMCNC: 32.7 GM/DL — SIGNIFICANT CHANGE UP (ref 32–36)
MCV RBC AUTO: 93.1 FL — SIGNIFICANT CHANGE UP (ref 80–100)
MONOCYTES # BLD AUTO: 0.54 K/UL — SIGNIFICANT CHANGE UP (ref 0–0.9)
MONOCYTES NFR BLD AUTO: 8.8 % — SIGNIFICANT CHANGE UP (ref 2–14)
NEUTROPHILS # BLD AUTO: 3.09 K/UL — SIGNIFICANT CHANGE UP (ref 1.8–7.4)
NEUTROPHILS NFR BLD AUTO: 50.2 % — SIGNIFICANT CHANGE UP (ref 43–77)
NITRITE UR-MCNC: NEGATIVE — SIGNIFICANT CHANGE UP
NITRITE UR-MCNC: NEGATIVE — SIGNIFICANT CHANGE UP
NRBC # BLD: 0 /100 WBCS — SIGNIFICANT CHANGE UP (ref 0–0)
NRBC BLD-RTO: 0 /100 WBCS — SIGNIFICANT CHANGE UP (ref 0–0)
PH UR: 6 — SIGNIFICANT CHANGE UP (ref 5–8)
PH UR: 8 — SIGNIFICANT CHANGE UP (ref 5–8)
PLATELET # BLD AUTO: 251 K/UL — SIGNIFICANT CHANGE UP (ref 150–400)
POTASSIUM SERPL-MCNC: 3.7 MMOL/L — SIGNIFICANT CHANGE UP (ref 3.5–5.3)
POTASSIUM SERPL-SCNC: 3.7 MMOL/L — SIGNIFICANT CHANGE UP (ref 3.5–5.3)
PROT SERPL-MCNC: 7.5 G/DL — SIGNIFICANT CHANGE UP (ref 6–8.3)
PROT UR-MCNC: NEGATIVE MG/DL — SIGNIFICANT CHANGE UP
PROT UR-MCNC: SIGNIFICANT CHANGE UP MG/DL
RBC # BLD: 4.5 M/UL — SIGNIFICANT CHANGE UP (ref 3.8–5.2)
RBC # FLD: 12.4 % — SIGNIFICANT CHANGE UP (ref 10.3–14.5)
RBC CASTS # UR COMP ASSIST: 21 /HPF — HIGH (ref 0–4)
RBC CASTS # UR COMP ASSIST: 436 /HPF — HIGH (ref 0–4)
REVIEW: SIGNIFICANT CHANGE UP
SODIUM SERPL-SCNC: 139 MMOL/L — SIGNIFICANT CHANGE UP (ref 135–145)
SP GR SPEC: 1.01 — SIGNIFICANT CHANGE UP (ref 1–1.03)
SP GR SPEC: 1.02 — SIGNIFICANT CHANGE UP (ref 1–1.03)
SQUAMOUS # UR AUTO: 0 /HPF — SIGNIFICANT CHANGE UP (ref 0–5)
SQUAMOUS # UR AUTO: 12 /HPF — HIGH (ref 0–5)
UROBILINOGEN FLD QL: 0.2 MG/DL — SIGNIFICANT CHANGE UP (ref 0.2–1)
UROBILINOGEN FLD QL: 1 MG/DL — SIGNIFICANT CHANGE UP (ref 0.2–1)
WBC # BLD: 6.16 K/UL — SIGNIFICANT CHANGE UP (ref 3.8–10.5)
WBC # FLD AUTO: 6.16 K/UL — SIGNIFICANT CHANGE UP (ref 3.8–10.5)
WBC UR QL: 1 /HPF — SIGNIFICANT CHANGE UP (ref 0–5)
WBC UR QL: 3 /HPF — SIGNIFICANT CHANGE UP (ref 0–5)

## 2024-09-19 PROCEDURE — 96361 HYDRATE IV INFUSION ADD-ON: CPT

## 2024-09-19 PROCEDURE — 80053 COMPREHEN METABOLIC PANEL: CPT

## 2024-09-19 PROCEDURE — 84702 CHORIONIC GONADOTROPIN TEST: CPT

## 2024-09-19 PROCEDURE — 85025 COMPLETE CBC W/AUTO DIFF WBC: CPT

## 2024-09-19 PROCEDURE — 74177 CT ABD & PELVIS W/CONTRAST: CPT | Mod: MC

## 2024-09-19 PROCEDURE — 99285 EMERGENCY DEPT VISIT HI MDM: CPT

## 2024-09-19 PROCEDURE — 76705 ECHO EXAM OF ABDOMEN: CPT | Mod: 26

## 2024-09-19 PROCEDURE — 36415 COLL VENOUS BLD VENIPUNCTURE: CPT

## 2024-09-19 PROCEDURE — 99284 EMERGENCY DEPT VISIT MOD MDM: CPT | Mod: 25

## 2024-09-19 PROCEDURE — 74177 CT ABD & PELVIS W/CONTRAST: CPT | Mod: 26,MC

## 2024-09-19 PROCEDURE — 96365 THER/PROPH/DIAG IV INF INIT: CPT | Mod: XU

## 2024-09-19 PROCEDURE — 81001 URINALYSIS AUTO W/SCOPE: CPT

## 2024-09-19 PROCEDURE — 96366 THER/PROPH/DIAG IV INF ADDON: CPT

## 2024-09-19 PROCEDURE — 87086 URINE CULTURE/COLONY COUNT: CPT

## 2024-09-19 PROCEDURE — 83690 ASSAY OF LIPASE: CPT

## 2024-09-19 PROCEDURE — 76705 ECHO EXAM OF ABDOMEN: CPT

## 2024-09-19 RX ORDER — SODIUM CHLORIDE 9 G/1000ML
1000 INJECTION, SOLUTION INTRAVENOUS ONCE
Refills: 0 | Status: COMPLETED | OUTPATIENT
Start: 2024-09-19 | End: 2024-09-19

## 2024-09-19 RX ORDER — ONDANSETRON HCL/PF 4 MG/2 ML
4 VIAL (ML) INJECTION ONCE
Refills: 0 | Status: COMPLETED | OUTPATIENT
Start: 2024-09-19 | End: 2024-09-19

## 2024-09-19 RX ORDER — ACETAMINOPHEN 500 MG/5ML
1000 LIQUID (ML) ORAL ONCE
Refills: 0 | Status: COMPLETED | OUTPATIENT
Start: 2024-09-19 | End: 2024-09-19

## 2024-09-19 RX ADMIN — SODIUM CHLORIDE 1000 MILLILITER(S): 9 INJECTION, SOLUTION INTRAVENOUS at 10:38

## 2024-09-19 RX ADMIN — Medication 1000 MILLIGRAM(S): at 10:38

## 2024-09-19 RX ADMIN — SODIUM CHLORIDE 1000 MILLILITER(S): 9 INJECTION, SOLUTION INTRAVENOUS at 08:04

## 2024-09-19 RX ADMIN — Medication 400 MILLIGRAM(S): at 08:38

## 2024-09-20 ENCOUNTER — NON-APPOINTMENT (OUTPATIENT)
Age: 39
End: 2024-09-20

## 2024-09-20 LAB
CULTURE RESULTS: SIGNIFICANT CHANGE UP
SPECIMEN SOURCE: SIGNIFICANT CHANGE UP

## 2024-10-02 ENCOUNTER — APPOINTMENT (OUTPATIENT)
Dept: UROLOGY | Facility: CLINIC | Age: 39
End: 2024-10-02
Payer: COMMERCIAL

## 2024-10-02 VITALS
HEART RATE: 77 BPM | OXYGEN SATURATION: 96 % | SYSTOLIC BLOOD PRESSURE: 146 MMHG | TEMPERATURE: 97.8 F | DIASTOLIC BLOOD PRESSURE: 97 MMHG

## 2024-10-02 DIAGNOSIS — Z84.1 FAMILY HISTORY OF DISORDERS OF KIDNEY AND URETER: ICD-10-CM

## 2024-10-02 DIAGNOSIS — N20.0 CALCULUS OF KIDNEY: ICD-10-CM

## 2024-10-02 DIAGNOSIS — G56.00 CARPAL TUNNEL SYNDROME, UNSPECIFIED UPPER LIMB: ICD-10-CM

## 2024-10-02 DIAGNOSIS — Z80.42 FAMILY HISTORY OF MALIGNANT NEOPLASM OF PROSTATE: ICD-10-CM

## 2024-10-02 DIAGNOSIS — Z80.7 FAMILY HISTORY OF OTHER MALIGNANT NEOPLASMS OF LYMPHOID, HEMATOPOIETIC AND RELATED TISSUES: ICD-10-CM

## 2024-10-02 PROCEDURE — 99203 OFFICE O/P NEW LOW 30 MIN: CPT

## 2024-10-02 NOTE — HISTORY OF PRESENT ILLNESS
[FreeTextEntry1] : 38 yo F presented to the ER with sudden onset of RLQ pain and vomiting CT showed punctate nonobstructing calculus in left kidney, normal right kidney Symptoms completed resolved after ER visit No prior history of nephrolithaisis Admits she drinks barely 1 bottle of water voids every 6 hours

## 2024-10-02 NOTE — HISTORY OF PRESENT ILLNESS
[FreeTextEntry1] : 40 yo F presented to the ER with sudden onset of RLQ pain and vomiting CT showed punctate nonobstructing calculus in left kidney, normal right kidney Symptoms completed resolved after ER visit No prior history of nephrolithaisis Admits she drinks barely 1 bottle of water voids every 6 hours

## 2024-10-02 NOTE — ASSESSMENT
[FreeTextEntry1] : 38 yo F with nephrolithiasis  - Reviewed CT imaging from ER visit through PACS and confirmed punctate stone in left kidney - Discussed potential etiologies for RLQ pain - unclear if she had any issues with right kidney - Given size, location and resolution of symptoms, no further urologic intervention indicated at this time - Discussed possible etiologies for nephrolithiasis. Reviewed behavioral modifications including adequate hydration, cutting back on coffee, dark sodas, dark teas, low sodium diet, increasing citrate levels with lemon juice.  - Discussed importance of seeking medical attention should intractable flank pain with nausea, vomiting or fever occur

## 2024-10-02 NOTE — REVIEW OF SYSTEMS
[Abdominal Pain] : abdominal pain [Constipation] : constipation [Told you have blood in urine on a urine test] : told blood was present in a urine test [History of kidney stones] : history of kidney stones [Bladder pressure] : experiences bladder pressure [Limb Weakness] : limb weakness [Negative] : Heme/Lymph [Heartburn] : no heartburn

## 2024-10-09 ENCOUNTER — APPOINTMENT (OUTPATIENT)
Dept: BARIATRICS/WEIGHT MGMT | Facility: CLINIC | Age: 39
End: 2024-10-09
Payer: COMMERCIAL

## 2024-10-09 ENCOUNTER — OUTPATIENT (OUTPATIENT)
Dept: OUTPATIENT SERVICES | Facility: HOSPITAL | Age: 39
LOS: 1 days | End: 2024-10-09
Payer: COMMERCIAL

## 2024-10-09 DIAGNOSIS — I10 ESSENTIAL (PRIMARY) HYPERTENSION: ICD-10-CM

## 2024-10-09 PROCEDURE — G2211 COMPLEX E/M VISIT ADD ON: CPT | Mod: 95

## 2024-10-09 PROCEDURE — 99213 OFFICE O/P EST LOW 20 MIN: CPT | Mod: 95

## 2024-10-09 PROCEDURE — G0463: CPT

## 2024-10-09 RX ORDER — TIRZEPATIDE 10 MG/.5ML
10 INJECTION, SOLUTION SUBCUTANEOUS
Qty: 4 | Refills: 5 | Status: ACTIVE | COMMUNITY
Start: 2024-10-09 | End: 1900-01-01

## 2024-10-10 ENCOUNTER — APPOINTMENT (OUTPATIENT)
Dept: BARIATRICS/WEIGHT MGMT | Facility: CLINIC | Age: 39
End: 2024-10-10
Payer: COMMERCIAL

## 2024-10-10 ENCOUNTER — OUTPATIENT (OUTPATIENT)
Dept: OUTPATIENT SERVICES | Facility: HOSPITAL | Age: 39
LOS: 1 days | End: 2024-10-10
Payer: COMMERCIAL

## 2024-10-10 DIAGNOSIS — E66.9 OBESITY, UNSPECIFIED: ICD-10-CM

## 2024-10-10 DIAGNOSIS — I10 ESSENTIAL (PRIMARY) HYPERTENSION: ICD-10-CM

## 2024-10-10 DIAGNOSIS — R73.03 PREDIABETES.: ICD-10-CM

## 2024-10-10 PROCEDURE — 99214 OFFICE O/P EST MOD 30 MIN: CPT | Mod: 95

## 2024-10-10 PROCEDURE — G0463: CPT

## 2024-10-10 PROCEDURE — G2211 COMPLEX E/M VISIT ADD ON: CPT | Mod: NC,95

## 2024-10-14 DIAGNOSIS — R73.03 PREDIABETES: ICD-10-CM

## 2024-10-14 DIAGNOSIS — E66.9 OBESITY, UNSPECIFIED: ICD-10-CM

## 2024-10-16 ENCOUNTER — APPOINTMENT (OUTPATIENT)
Dept: INTERNAL MEDICINE | Facility: CLINIC | Age: 39
End: 2024-10-16

## 2024-11-04 ENCOUNTER — APPOINTMENT (OUTPATIENT)
Dept: CARDIOLOGY | Facility: CLINIC | Age: 39
End: 2024-11-04
Payer: COMMERCIAL

## 2024-11-04 VITALS
DIASTOLIC BLOOD PRESSURE: 69 MMHG | HEART RATE: 90 BPM | SYSTOLIC BLOOD PRESSURE: 102 MMHG | HEIGHT: 67 IN | BODY MASS INDEX: 29.66 KG/M2 | WEIGHT: 189 LBS

## 2024-11-04 DIAGNOSIS — E78.5 HYPERLIPIDEMIA, UNSPECIFIED: ICD-10-CM

## 2024-11-04 DIAGNOSIS — Z91.89 OTHER SPECIFIED PERSONAL RISK FACTORS, NOT ELSEWHERE CLASSIFIED: ICD-10-CM

## 2024-11-04 PROCEDURE — G2211 COMPLEX E/M VISIT ADD ON: CPT | Mod: NC

## 2024-11-04 PROCEDURE — 99214 OFFICE O/P EST MOD 30 MIN: CPT

## 2024-11-04 PROCEDURE — 93000 ELECTROCARDIOGRAM COMPLETE: CPT

## 2024-11-07 ENCOUNTER — APPOINTMENT (OUTPATIENT)
Dept: INTERNAL MEDICINE | Facility: CLINIC | Age: 39
End: 2024-11-07

## 2024-11-07 ENCOUNTER — LABORATORY RESULT (OUTPATIENT)
Age: 39
End: 2024-11-07

## 2024-11-07 VITALS
HEART RATE: 85 BPM | WEIGHT: 192 LBS | SYSTOLIC BLOOD PRESSURE: 133 MMHG | OXYGEN SATURATION: 100 % | DIASTOLIC BLOOD PRESSURE: 88 MMHG | HEIGHT: 67 IN | BODY MASS INDEX: 30.13 KG/M2

## 2024-11-07 DIAGNOSIS — R73.09 OTHER ABNORMAL GLUCOSE: ICD-10-CM

## 2024-11-07 DIAGNOSIS — R73.03 PREDIABETES.: ICD-10-CM

## 2024-11-07 DIAGNOSIS — N20.0 CALCULUS OF KIDNEY: ICD-10-CM

## 2024-11-07 DIAGNOSIS — I10 ESSENTIAL (PRIMARY) HYPERTENSION: ICD-10-CM

## 2024-11-07 DIAGNOSIS — Z23 ENCOUNTER FOR IMMUNIZATION: ICD-10-CM

## 2024-11-07 DIAGNOSIS — R10.9 UNSPECIFIED ABDOMINAL PAIN: ICD-10-CM

## 2024-11-07 DIAGNOSIS — R63.4 ABNORMAL WEIGHT LOSS: ICD-10-CM

## 2024-11-07 DIAGNOSIS — E78.00 PURE HYPERCHOLESTEROLEMIA, UNSPECIFIED: ICD-10-CM

## 2024-11-07 PROCEDURE — 99214 OFFICE O/P EST MOD 30 MIN: CPT | Mod: 25

## 2024-11-07 PROCEDURE — 36415 COLL VENOUS BLD VENIPUNCTURE: CPT

## 2024-11-07 PROCEDURE — 90656 IIV3 VACC NO PRSV 0.5 ML IM: CPT

## 2024-11-07 PROCEDURE — G0008: CPT

## 2024-11-07 RX ORDER — LEVONORGESTREL 52 MG/1
20 INTRAUTERINE DEVICE INTRAUTERINE
Refills: 0 | Status: ACTIVE | COMMUNITY

## 2024-11-11 LAB
ALBUMIN SERPL ELPH-MCNC: 4.6 G/DL
ALP BLD-CCNC: 74 U/L
ALT SERPL-CCNC: 25 U/L
ANION GAP SERPL CALC-SCNC: 15 MMOL/L
APPEARANCE: CLEAR
AST SERPL-CCNC: 30 U/L
BASOPHILS # BLD AUTO: 0.06 K/UL
BASOPHILS NFR BLD AUTO: 0.7 %
BILIRUB SERPL-MCNC: 0.3 MG/DL
BILIRUBIN URINE: NEGATIVE
BLOOD URINE: ABNORMAL
BUN SERPL-MCNC: 18 MG/DL
CALCIUM SERPL-MCNC: 11.3 MG/DL
CHLORIDE SERPL-SCNC: 103 MMOL/L
CHOLEST SERPL-MCNC: 187 MG/DL
CO2 SERPL-SCNC: 23 MMOL/L
COLOR: YELLOW
CREAT SERPL-MCNC: 0.71 MG/DL
EGFR: 109 ML/MIN/1.73M2
EOSINOPHIL # BLD AUTO: 0.09 K/UL
EOSINOPHIL NFR BLD AUTO: 1.1 %
ESTIMATED AVERAGE GLUCOSE: 108 MG/DL
FOLATE SERPL-MCNC: 12.4 NG/ML
GLUCOSE QUALITATIVE U: NEGATIVE MG/DL
GLUCOSE SERPL-MCNC: 88 MG/DL
HBA1C MFR BLD HPLC: 5.4 %
HCT VFR BLD CALC: 42.2 %
HDLC SERPL-MCNC: 37 MG/DL
HGB BLD-MCNC: 13.5 G/DL
IMM GRANULOCYTES NFR BLD AUTO: 0.2 %
KETONES URINE: NEGATIVE MG/DL
LDLC SERPL CALC-MCNC: 134 MG/DL
LEUKOCYTE ESTERASE URINE: NEGATIVE
LYMPHOCYTES # BLD AUTO: 2.67 K/UL
LYMPHOCYTES NFR BLD AUTO: 33 %
MAN DIFF?: NORMAL
MCHC RBC-ENTMCNC: 30.6 PG
MCHC RBC-ENTMCNC: 32 G/DL
MCV RBC AUTO: 95.7 FL
MONOCYTES # BLD AUTO: 0.68 K/UL
MONOCYTES NFR BLD AUTO: 8.4 %
NEUTROPHILS # BLD AUTO: 4.58 K/UL
NEUTROPHILS NFR BLD AUTO: 56.6 %
NITRITE URINE: NEGATIVE
NONHDLC SERPL-MCNC: 150 MG/DL
PH URINE: 6
PLATELET # BLD AUTO: 272 K/UL
POTASSIUM SERPL-SCNC: 4 MMOL/L
PROT SERPL-MCNC: 7.8 G/DL
PROTEIN URINE: NEGATIVE MG/DL
RBC # BLD: 4.41 M/UL
RBC # FLD: 12.4 %
SODIUM SERPL-SCNC: 141 MMOL/L
SPECIFIC GRAVITY URINE: 1.02
T3FREE SERPL-MCNC: 3.04 PG/ML
T4 FREE SERPL-MCNC: 1.3 NG/DL
TRIGL SERPL-MCNC: 83 MG/DL
TSH SERPL-ACNC: 0.48 UIU/ML
UROBILINOGEN URINE: 0.2 MG/DL
WBC # FLD AUTO: 8.1 K/UL

## 2024-11-20 ENCOUNTER — OUTPATIENT (OUTPATIENT)
Dept: OUTPATIENT SERVICES | Facility: HOSPITAL | Age: 39
LOS: 1 days | End: 2024-11-20
Payer: COMMERCIAL

## 2024-11-20 ENCOUNTER — APPOINTMENT (OUTPATIENT)
Dept: BARIATRICS/WEIGHT MGMT | Facility: CLINIC | Age: 39
End: 2024-11-20

## 2024-11-20 DIAGNOSIS — E66.9 OBESITY, UNSPECIFIED: ICD-10-CM

## 2024-11-20 DIAGNOSIS — I10 ESSENTIAL (PRIMARY) HYPERTENSION: ICD-10-CM

## 2024-11-20 DIAGNOSIS — K08.491 PARTIAL LOSS OF TEETH DUE TO OTHER SPECIFIED CAUSE, CLASS I: Chronic | ICD-10-CM

## 2024-11-20 PROCEDURE — G0463: CPT

## 2024-11-20 PROCEDURE — 99213 OFFICE O/P EST LOW 20 MIN: CPT | Mod: 95

## 2024-11-20 PROCEDURE — G2211 COMPLEX E/M VISIT ADD ON: CPT | Mod: 95

## 2024-11-21 ENCOUNTER — APPOINTMENT (OUTPATIENT)
Dept: BARIATRICS/WEIGHT MGMT | Facility: CLINIC | Age: 39
End: 2024-11-21
Payer: COMMERCIAL

## 2024-11-21 ENCOUNTER — OUTPATIENT (OUTPATIENT)
Dept: OUTPATIENT SERVICES | Facility: HOSPITAL | Age: 39
LOS: 1 days | End: 2024-11-21
Payer: COMMERCIAL

## 2024-11-21 VITALS — HEIGHT: 67 IN | BODY MASS INDEX: 28.56 KG/M2 | WEIGHT: 182 LBS

## 2024-11-21 DIAGNOSIS — I10 ESSENTIAL (PRIMARY) HYPERTENSION: ICD-10-CM

## 2024-11-21 DIAGNOSIS — R73.03 PREDIABETES.: ICD-10-CM

## 2024-11-21 DIAGNOSIS — E66.9 OBESITY, UNSPECIFIED: ICD-10-CM

## 2024-11-21 PROCEDURE — 99214 OFFICE O/P EST MOD 30 MIN: CPT | Mod: 95

## 2024-11-21 PROCEDURE — G0463: CPT

## 2024-11-21 PROCEDURE — G2211 COMPLEX E/M VISIT ADD ON: CPT | Mod: NC,95

## 2024-12-03 ENCOUNTER — APPOINTMENT (OUTPATIENT)
Dept: OBGYN | Facility: CLINIC | Age: 39
End: 2024-12-03
Payer: COMMERCIAL

## 2024-12-03 VITALS
HEART RATE: 76 BPM | DIASTOLIC BLOOD PRESSURE: 88 MMHG | BODY MASS INDEX: 28.56 KG/M2 | WEIGHT: 182 LBS | HEIGHT: 67 IN | SYSTOLIC BLOOD PRESSURE: 138 MMHG

## 2024-12-03 DIAGNOSIS — E66.9 OBESITY, UNSPECIFIED: ICD-10-CM

## 2024-12-03 DIAGNOSIS — R73.03 PREDIABETES: ICD-10-CM

## 2024-12-03 DIAGNOSIS — R10.2 PELVIC AND PERINEAL PAIN: ICD-10-CM

## 2024-12-03 PROCEDURE — 99213 OFFICE O/P EST LOW 20 MIN: CPT

## 2024-12-04 ENCOUNTER — APPOINTMENT (OUTPATIENT)
Dept: OBGYN | Facility: CLINIC | Age: 39
End: 2024-12-04

## 2024-12-04 DIAGNOSIS — N89.8 OTHER SPECIFIED NONINFLAMMATORY DISORDERS OF VAGINA: ICD-10-CM

## 2024-12-04 LAB
BV BACTERIA RRNA VAG QL NAA+PROBE: NOT DETECTED
C GLABRATA RNA VAG QL NAA+PROBE: NOT DETECTED
CANDIDA RRNA VAG QL PROBE: DETECTED
T VAGINALIS RRNA SPEC QL NAA+PROBE: NOT DETECTED

## 2024-12-04 RX ORDER — FLUCONAZOLE 150 MG/1
150 TABLET ORAL
Qty: 2 | Refills: 2 | Status: ACTIVE | COMMUNITY
Start: 2024-12-04 | End: 1900-01-01

## 2024-12-05 LAB — BACTERIA UR CULT: NORMAL

## 2024-12-09 NOTE — OB RN PATIENT PROFILE - NSWEIGHTCALCTOOLDRUG_GEN_A_CORE
----- Message from Dory sent at 12/4/2024 10:40 AM CST -----  Contact: Anel  Mrs. Tam needs a call back at [phone] in regards to her appointment that's schedule on 12/10. She stated that 8:00am wont be a good time because she will be still on the bus and wanted to know if they have time available after 10am.    Thanks    used

## 2025-01-23 ENCOUNTER — OUTPATIENT (OUTPATIENT)
Dept: OUTPATIENT SERVICES | Facility: HOSPITAL | Age: 40
LOS: 1 days | End: 2025-01-23
Payer: COMMERCIAL

## 2025-01-23 ENCOUNTER — APPOINTMENT (OUTPATIENT)
Dept: BARIATRICS/WEIGHT MGMT | Facility: CLINIC | Age: 40
End: 2025-01-23
Payer: COMMERCIAL

## 2025-01-23 VITALS — WEIGHT: 180 LBS | HEIGHT: 67 IN | BODY MASS INDEX: 28.25 KG/M2

## 2025-01-23 DIAGNOSIS — I10 ESSENTIAL (PRIMARY) HYPERTENSION: ICD-10-CM

## 2025-01-23 DIAGNOSIS — K08.491 PARTIAL LOSS OF TEETH DUE TO OTHER SPECIFIED CAUSE, CLASS I: Chronic | ICD-10-CM

## 2025-01-23 DIAGNOSIS — R73.03 PREDIABETES.: ICD-10-CM

## 2025-01-23 PROCEDURE — G0463: CPT

## 2025-01-23 PROCEDURE — 99214 OFFICE O/P EST MOD 30 MIN: CPT | Mod: 95

## 2025-01-27 DIAGNOSIS — R73.03 PREDIABETES: ICD-10-CM

## 2025-01-27 DIAGNOSIS — E66.9 OBESITY, UNSPECIFIED: ICD-10-CM

## 2025-01-29 ENCOUNTER — APPOINTMENT (OUTPATIENT)
Dept: BARIATRICS/WEIGHT MGMT | Facility: CLINIC | Age: 40
End: 2025-01-29
Payer: COMMERCIAL

## 2025-01-29 DIAGNOSIS — E66.9 OBESITY, UNSPECIFIED: ICD-10-CM

## 2025-01-29 PROCEDURE — 99213 OFFICE O/P EST LOW 20 MIN: CPT | Mod: 95

## 2025-01-29 RX ORDER — TIRZEPATIDE 12.5 MG/.5ML
12.5 INJECTION, SOLUTION SUBCUTANEOUS
Qty: 4 | Refills: 5 | Status: ACTIVE | COMMUNITY
Start: 2025-01-29 | End: 1900-01-01

## 2025-01-29 NOTE — OB PST NOTE - NSHPROSALLOTHERNEGRD_GEN_ALL_CORE
Hpi Title: Evaluation of Skin Lesions How Severe Are Your Spot(S)?: mild Have Your Spot(S) Been Treated In The Past?: has not been treated All other review of systems negative, except as noted in HPI

## 2025-01-30 ENCOUNTER — OUTPATIENT (OUTPATIENT)
Dept: OUTPATIENT SERVICES | Facility: HOSPITAL | Age: 40
LOS: 1 days | End: 2025-01-30

## 2025-01-30 DIAGNOSIS — I10 ESSENTIAL (PRIMARY) HYPERTENSION: ICD-10-CM

## 2025-01-30 DIAGNOSIS — K08.491 PARTIAL LOSS OF TEETH DUE TO OTHER SPECIFIED CAUSE, CLASS I: Chronic | ICD-10-CM

## 2025-03-07 ENCOUNTER — APPOINTMENT (OUTPATIENT)
Dept: BARIATRICS/WEIGHT MGMT | Facility: CLINIC | Age: 40
End: 2025-03-07

## 2025-03-19 ENCOUNTER — OUTPATIENT (OUTPATIENT)
Dept: OUTPATIENT SERVICES | Facility: HOSPITAL | Age: 40
LOS: 1 days | End: 2025-03-19

## 2025-03-19 ENCOUNTER — APPOINTMENT (OUTPATIENT)
Dept: BARIATRICS/WEIGHT MGMT | Facility: CLINIC | Age: 40
End: 2025-03-19
Payer: COMMERCIAL

## 2025-03-19 VITALS — WEIGHT: 170 LBS | HEIGHT: 67 IN | BODY MASS INDEX: 26.68 KG/M2

## 2025-03-19 DIAGNOSIS — I10 ESSENTIAL (PRIMARY) HYPERTENSION: ICD-10-CM

## 2025-03-19 DIAGNOSIS — K08.491 PARTIAL LOSS OF TEETH DUE TO OTHER SPECIFIED CAUSE, CLASS I: Chronic | ICD-10-CM

## 2025-03-19 DIAGNOSIS — E66.9 OBESITY, UNSPECIFIED: ICD-10-CM

## 2025-03-19 DIAGNOSIS — R73.03 PREDIABETES.: ICD-10-CM

## 2025-03-19 PROCEDURE — 99214 OFFICE O/P EST MOD 30 MIN: CPT | Mod: 95

## 2025-03-28 DIAGNOSIS — E66.9 OBESITY, UNSPECIFIED: ICD-10-CM

## 2025-03-28 DIAGNOSIS — R73.03 PREDIABETES: ICD-10-CM

## 2025-05-08 ENCOUNTER — NON-APPOINTMENT (OUTPATIENT)
Age: 40
End: 2025-05-08

## 2025-05-08 ENCOUNTER — APPOINTMENT (OUTPATIENT)
Dept: CARDIOLOGY | Facility: CLINIC | Age: 40
End: 2025-05-08

## 2025-05-08 VITALS
OXYGEN SATURATION: 99 % | BODY MASS INDEX: 26.53 KG/M2 | HEART RATE: 77 BPM | DIASTOLIC BLOOD PRESSURE: 80 MMHG | WEIGHT: 169 LBS | SYSTOLIC BLOOD PRESSURE: 119 MMHG | HEIGHT: 67 IN

## 2025-05-08 PROCEDURE — 99215 OFFICE O/P EST HI 40 MIN: CPT | Mod: 25

## 2025-05-08 PROCEDURE — 93000 ELECTROCARDIOGRAM COMPLETE: CPT

## 2025-05-21 ENCOUNTER — APPOINTMENT (OUTPATIENT)
Dept: BARIATRICS/WEIGHT MGMT | Facility: CLINIC | Age: 40
End: 2025-05-21

## 2025-06-11 ENCOUNTER — APPOINTMENT (OUTPATIENT)
Dept: OBGYN | Facility: CLINIC | Age: 40
End: 2025-06-11

## 2025-06-11 VITALS
BODY MASS INDEX: 26.06 KG/M2 | WEIGHT: 166 LBS | DIASTOLIC BLOOD PRESSURE: 72 MMHG | SYSTOLIC BLOOD PRESSURE: 114 MMHG | HEIGHT: 67 IN

## 2025-06-11 PROBLEM — Z11.51 ENCOUNTER FOR SCREENING FOR HUMAN PAPILLOMAVIRUS (HPV): Status: ACTIVE | Noted: 2025-06-11

## 2025-06-11 PROBLEM — Z01.419 ENCOUNTER FOR WELL WOMAN EXAM: Status: ACTIVE | Noted: 2025-06-11

## 2025-06-11 PROBLEM — Z11.51 ENCOUNTER FOR SCREENING FOR HUMAN PAPILLOMAVIRUS (HPV): Status: RESOLVED | Noted: 2023-03-23 | Resolved: 2025-06-11

## 2025-06-11 PROBLEM — Z12.39 SCREENING FOR BREAST CANCER: Status: ACTIVE | Noted: 2025-06-11

## 2025-06-11 PROCEDURE — 36415 COLL VENOUS BLD VENIPUNCTURE: CPT

## 2025-06-11 PROCEDURE — 99396 PREV VISIT EST AGE 40-64: CPT

## 2025-06-13 ENCOUNTER — NON-APPOINTMENT (OUTPATIENT)
Age: 40
End: 2025-06-13

## 2025-06-13 PROBLEM — B37.31 VAGINAL CANDIDA: Status: ACTIVE | Noted: 2025-06-13 | Resolved: 2025-07-13

## 2025-06-13 LAB
BV BACTERIA RRNA VAG QL NAA+PROBE: NOT DETECTED
C GLABRATA RNA VAG QL NAA+PROBE: NOT DETECTED
C TRACH RRNA SPEC QL NAA+PROBE: NOT DETECTED
CANDIDA RRNA VAG QL PROBE: DETECTED
HBV SURFACE AG SER QL: NONREACTIVE
HCV AB SER QL: NONREACTIVE
HCV S/CO RATIO: 0.14 S/CO
HIV1+2 AB SPEC QL IA.RAPID: NONREACTIVE
HPV HIGH+LOW RISK DNA PNL CVX: NOT DETECTED
N GONORRHOEA RRNA SPEC QL NAA+PROBE: NOT DETECTED
SOURCE TP AMPLIFICATION: NORMAL
T PALLIDUM AB SER QL IA: NEGATIVE
T VAGINALIS RRNA SPEC QL NAA+PROBE: NOT DETECTED

## 2025-06-16 ENCOUNTER — TRANSCRIPTION ENCOUNTER (OUTPATIENT)
Age: 40
End: 2025-06-16

## 2025-06-16 LAB — CYTOLOGY CVX/VAG DOC THIN PREP: ABNORMAL

## 2025-07-17 ENCOUNTER — APPOINTMENT (OUTPATIENT)
Dept: ORTHOPEDIC SURGERY | Facility: CLINIC | Age: 40
End: 2025-07-17
Payer: COMMERCIAL

## 2025-07-17 PROBLEM — M25.852 FEMOROACETABULAR IMPINGEMENT OF LEFT HIP: Status: ACTIVE | Noted: 2025-07-17

## 2025-07-17 PROCEDURE — 73502 X-RAY EXAM HIP UNI 2-3 VIEWS: CPT

## 2025-07-17 PROCEDURE — 99204 OFFICE O/P NEW MOD 45 MIN: CPT | Mod: 25

## 2025-07-17 RX ORDER — NAPROXEN 500 MG/1
500 TABLET ORAL
Qty: 30 | Refills: 0 | Status: ACTIVE | COMMUNITY
Start: 2025-07-17 | End: 1900-01-01

## 2025-07-18 ENCOUNTER — APPOINTMENT (OUTPATIENT)
Dept: BARIATRICS/WEIGHT MGMT | Facility: CLINIC | Age: 40
End: 2025-07-18
Payer: COMMERCIAL

## 2025-07-18 ENCOUNTER — OUTPATIENT (OUTPATIENT)
Dept: OUTPATIENT SERVICES | Facility: HOSPITAL | Age: 40
LOS: 1 days | End: 2025-07-18

## 2025-07-18 DIAGNOSIS — I10 ESSENTIAL (PRIMARY) HYPERTENSION: ICD-10-CM

## 2025-07-18 DIAGNOSIS — K08.491 PARTIAL LOSS OF TEETH DUE TO OTHER SPECIFIED CAUSE, CLASS I: Chronic | ICD-10-CM

## 2025-07-18 PROCEDURE — G2211 COMPLEX E/M VISIT ADD ON: CPT | Mod: NC,95

## 2025-07-18 PROCEDURE — 99213 OFFICE O/P EST LOW 20 MIN: CPT | Mod: 95

## 2025-07-18 RX ORDER — TIRZEPATIDE 15 MG/.5ML
15 INJECTION, SOLUTION SUBCUTANEOUS
Qty: 3 | Refills: 1 | Status: ACTIVE | COMMUNITY
Start: 2025-07-18 | End: 1900-01-01

## 2025-07-21 ENCOUNTER — APPOINTMENT (OUTPATIENT)
Dept: ORTHOPEDIC SURGERY | Facility: CLINIC | Age: 40
End: 2025-07-21
Payer: COMMERCIAL

## 2025-07-21 DIAGNOSIS — G56.00 CARPAL TUNNEL SYNDROME, UNSPECIFIED UPPER LIMB: ICD-10-CM

## 2025-07-21 PROCEDURE — 73130 X-RAY EXAM OF HAND: CPT | Mod: 50

## 2025-07-21 PROCEDURE — 99203 OFFICE O/P NEW LOW 30 MIN: CPT | Mod: 25

## 2025-07-22 ENCOUNTER — NON-APPOINTMENT (OUTPATIENT)
Age: 40
End: 2025-07-22

## 2025-07-22 ENCOUNTER — APPOINTMENT (OUTPATIENT)
Dept: INTERNAL MEDICINE | Facility: CLINIC | Age: 40
End: 2025-07-22
Payer: COMMERCIAL

## 2025-07-22 DIAGNOSIS — E55.9 VITAMIN D DEFICIENCY, UNSPECIFIED: ICD-10-CM

## 2025-07-22 DIAGNOSIS — E53.8 DEFICIENCY OF OTHER SPECIFIED B GROUP VITAMINS: ICD-10-CM

## 2025-07-22 PROCEDURE — 36415 COLL VENOUS BLD VENIPUNCTURE: CPT

## 2025-07-23 LAB
25(OH)D3 SERPL-MCNC: 18.7 NG/ML
ALBUMIN SERPL ELPH-MCNC: 4.8 G/DL
ALP BLD-CCNC: 79 U/L
ALT SERPL-CCNC: 47 U/L
ANION GAP SERPL CALC-SCNC: 17 MMOL/L
AST SERPL-CCNC: 31 U/L
BASOPHILS # BLD AUTO: 0.05 K/UL
BASOPHILS NFR BLD AUTO: 0.9 %
BILIRUB SERPL-MCNC: 0.3 MG/DL
BUN SERPL-MCNC: 13 MG/DL
CALCIUM SERPL-MCNC: 11 MG/DL
CHLORIDE SERPL-SCNC: 101 MMOL/L
CHOLEST SERPL-MCNC: 207 MG/DL
CO2 SERPL-SCNC: 19 MMOL/L
CREAT SERPL-MCNC: 0.67 MG/DL
EGFRCR SERPLBLD CKD-EPI 2021: 113 ML/MIN/1.73M2
EOSINOPHIL # BLD AUTO: 0.09 K/UL
EOSINOPHIL NFR BLD AUTO: 1.5 %
ERYTHROCYTE [SEDIMENTATION RATE] IN BLOOD BY WESTERGREN METHOD: 7 MM/HR
ESTIMATED AVERAGE GLUCOSE: 97 MG/DL
GLUCOSE SERPL-MCNC: 66 MG/DL
HBA1C MFR BLD HPLC: 5 %
HCT VFR BLD CALC: 41.3 %
HDLC SERPL-MCNC: 51 MG/DL
HGB BLD-MCNC: 12.9 G/DL
IMM GRANULOCYTES NFR BLD AUTO: 0 %
LDLC SERPL-MCNC: 143 MG/DL
LYMPHOCYTES # BLD AUTO: 2.25 K/UL
LYMPHOCYTES NFR BLD AUTO: 38.4 %
MAN DIFF?: NORMAL
MCHC RBC-ENTMCNC: 31.1 PG
MCHC RBC-ENTMCNC: 31.2 G/DL
MCV RBC AUTO: 99.5 FL
MONOCYTES # BLD AUTO: 0.56 K/UL
MONOCYTES NFR BLD AUTO: 9.6 %
NEUTROPHILS # BLD AUTO: 2.91 K/UL
NEUTROPHILS NFR BLD AUTO: 49.6 %
NONHDLC SERPL-MCNC: 156 MG/DL
PLATELET # BLD AUTO: 274 K/UL
POTASSIUM SERPL-SCNC: 4.3 MMOL/L
PROT SERPL-MCNC: 7.7 G/DL
RBC # BLD: 4.15 M/UL
RBC # FLD: 13.3 %
SODIUM SERPL-SCNC: 137 MMOL/L
T3FREE SERPL-MCNC: 3.32 PG/ML
T4 FREE SERPL-MCNC: 1.4 NG/DL
TRIGL SERPL-MCNC: 73 MG/DL
TSH SERPL-ACNC: 0.87 UIU/ML
VIT B12 SERPL-MCNC: 491 PG/ML
WBC # FLD AUTO: 5.86 K/UL

## 2025-07-24 ENCOUNTER — APPOINTMENT (OUTPATIENT)
Dept: NEUROLOGY | Facility: CLINIC | Age: 40
End: 2025-07-24
Payer: COMMERCIAL

## 2025-07-24 PROCEDURE — 95886 MUSC TEST DONE W/N TEST COMP: CPT

## 2025-07-24 PROCEDURE — 95913 NRV CNDJ TEST 13/> STUDIES: CPT

## 2025-08-04 ENCOUNTER — APPOINTMENT (OUTPATIENT)
Dept: ORTHOPEDIC SURGERY | Facility: CLINIC | Age: 40
End: 2025-08-04
Payer: COMMERCIAL

## 2025-08-04 DIAGNOSIS — G56.01 CARPAL TUNNEL SYNDROME, RIGHT UPPER LIMB: ICD-10-CM

## 2025-08-04 DIAGNOSIS — G56.02 CARPAL TUNNEL SYNDROME, LEFT UPPER LIMB: ICD-10-CM

## 2025-08-04 DIAGNOSIS — G56.22 LESION OF ULNAR NERVE, LEFT UPPER LIMB: ICD-10-CM

## 2025-08-04 PROCEDURE — 99213 OFFICE O/P EST LOW 20 MIN: CPT

## 2025-08-05 ENCOUNTER — APPOINTMENT (OUTPATIENT)
Dept: INTERNAL MEDICINE | Facility: CLINIC | Age: 40
End: 2025-08-05
Payer: COMMERCIAL

## 2025-08-05 VITALS
BODY MASS INDEX: 26.21 KG/M2 | DIASTOLIC BLOOD PRESSURE: 89 MMHG | WEIGHT: 167 LBS | SYSTOLIC BLOOD PRESSURE: 138 MMHG | HEART RATE: 80 BPM | OXYGEN SATURATION: 98 % | HEIGHT: 67 IN

## 2025-08-05 DIAGNOSIS — Z12.39 ENCOUNTER FOR OTHER SCREENING FOR MALIGNANT NEOPLASM OF BREAST: ICD-10-CM

## 2025-08-05 DIAGNOSIS — R22.41 LOCALIZED SWELLING, MASS AND LUMP, RIGHT LOWER LIMB: ICD-10-CM

## 2025-08-05 DIAGNOSIS — Z00.00 ENCOUNTER FOR GENERAL ADULT MEDICAL EXAMINATION W/OUT ABNORMAL FINDINGS: ICD-10-CM

## 2025-08-05 DIAGNOSIS — E78.5 HYPERLIPIDEMIA, UNSPECIFIED: ICD-10-CM

## 2025-08-05 DIAGNOSIS — Z91.89 OTHER SPECIFIED PERSONAL RISK FACTORS, NOT ELSEWHERE CLASSIFIED: ICD-10-CM

## 2025-08-05 DIAGNOSIS — R22.40 LOCALIZED SWELLING, MASS AND LUMP, UNSPECIFIED LOWER LIMB: ICD-10-CM

## 2025-08-05 PROCEDURE — 99396 PREV VISIT EST AGE 40-64: CPT

## 2025-08-05 PROCEDURE — 99214 OFFICE O/P EST MOD 30 MIN: CPT | Mod: 25

## 2025-08-05 RX ORDER — VITAMIN K2 90 MCG
125 MCG CAPSULE ORAL
Qty: 60 | Refills: 3 | Status: ACTIVE | COMMUNITY
Start: 2025-08-05

## 2025-08-05 RX ORDER — ROSUVASTATIN CALCIUM 10 MG/1
10 TABLET, FILM COATED ORAL
Qty: 60 | Refills: 3 | Status: ACTIVE | COMMUNITY
Start: 2025-08-05 | End: 1900-01-01

## 2025-08-08 LAB
APPEARANCE: CLEAR
BACTERIA: NEGATIVE /HPF
BILIRUBIN URINE: NEGATIVE
BLOOD URINE: NEGATIVE
CAST: 0 /LPF
COLOR: YELLOW
EPITHELIAL CELLS: 2 /HPF
GLUCOSE QUALITATIVE U: NEGATIVE MG/DL
KETONES URINE: NEGATIVE MG/DL
LEUKOCYTE ESTERASE URINE: NEGATIVE
MICROSCOPIC-UA: NORMAL
NITRITE URINE: NEGATIVE
PH URINE: 6
PROTEIN URINE: NEGATIVE MG/DL
RED BLOOD CELLS URINE: 2 /HPF
SPECIFIC GRAVITY URINE: 1.02
UROBILINOGEN URINE: 0.2 MG/DL
WHITE BLOOD CELLS URINE: 0 /HPF

## 2025-08-16 ENCOUNTER — OUTPATIENT (OUTPATIENT)
Dept: OUTPATIENT SERVICES | Facility: HOSPITAL | Age: 40
LOS: 1 days | End: 2025-08-16
Payer: SELF-PAY

## 2025-08-16 ENCOUNTER — APPOINTMENT (OUTPATIENT)
Dept: CT IMAGING | Facility: CLINIC | Age: 40
End: 2025-08-16
Payer: SELF-PAY

## 2025-08-16 DIAGNOSIS — Z91.89 OTHER SPECIFIED PERSONAL RISK FACTORS, NOT ELSEWHERE CLASSIFIED: ICD-10-CM

## 2025-08-16 DIAGNOSIS — K08.491 PARTIAL LOSS OF TEETH DUE TO OTHER SPECIFIED CAUSE, CLASS I: Chronic | ICD-10-CM

## 2025-08-16 PROCEDURE — 75571 CT HRT W/O DYE W/CA TEST: CPT | Mod: 26

## 2025-08-16 PROCEDURE — 75571 CT HRT W/O DYE W/CA TEST: CPT

## 2025-08-18 ENCOUNTER — OUTPATIENT (OUTPATIENT)
Dept: OUTPATIENT SERVICES | Facility: HOSPITAL | Age: 40
LOS: 1 days | End: 2025-08-18

## 2025-08-18 ENCOUNTER — APPOINTMENT (OUTPATIENT)
Dept: BARIATRICS/WEIGHT MGMT | Facility: CLINIC | Age: 40
End: 2025-08-18
Payer: COMMERCIAL

## 2025-08-18 VITALS — HEIGHT: 67 IN | WEIGHT: 159 LBS | BODY MASS INDEX: 24.96 KG/M2

## 2025-08-18 DIAGNOSIS — E66.9 OBESITY, UNSPECIFIED: ICD-10-CM

## 2025-08-18 DIAGNOSIS — I10 ESSENTIAL (PRIMARY) HYPERTENSION: ICD-10-CM

## 2025-08-18 PROCEDURE — G2211 COMPLEX E/M VISIT ADD ON: CPT | Mod: NC,95

## 2025-08-18 PROCEDURE — 99214 OFFICE O/P EST MOD 30 MIN: CPT | Mod: 95

## 2025-08-27 ENCOUNTER — APPOINTMENT (OUTPATIENT)
Dept: ORTHOPEDIC SURGERY | Facility: CLINIC | Age: 40
End: 2025-08-27

## 2025-09-03 ENCOUNTER — APPOINTMENT (OUTPATIENT)
Dept: ORTHOPEDIC SURGERY | Facility: CLINIC | Age: 40
End: 2025-09-03
Payer: COMMERCIAL

## 2025-09-03 VITALS — WEIGHT: 160 LBS | HEIGHT: 67 IN | BODY MASS INDEX: 25.11 KG/M2

## 2025-09-03 DIAGNOSIS — M25.852 OTHER SPECIFIED JOINT DISORDERS, LEFT HIP: ICD-10-CM

## 2025-09-03 PROCEDURE — 99213 OFFICE O/P EST LOW 20 MIN: CPT

## 2025-09-05 ENCOUNTER — APPOINTMENT (OUTPATIENT)
Dept: BARIATRICS/WEIGHT MGMT | Facility: CLINIC | Age: 40
End: 2025-09-05
Payer: COMMERCIAL

## 2025-09-05 ENCOUNTER — OUTPATIENT (OUTPATIENT)
Dept: OUTPATIENT SERVICES | Facility: HOSPITAL | Age: 40
LOS: 1 days | End: 2025-09-05

## 2025-09-05 VITALS — HEIGHT: 67 IN | WEIGHT: 160 LBS | BODY MASS INDEX: 25.11 KG/M2

## 2025-09-05 DIAGNOSIS — I10 ESSENTIAL (PRIMARY) HYPERTENSION: ICD-10-CM

## 2025-09-05 DIAGNOSIS — E78.5 HYPERLIPIDEMIA, UNSPECIFIED: ICD-10-CM

## 2025-09-05 DIAGNOSIS — K08.491 PARTIAL LOSS OF TEETH DUE TO OTHER SPECIFIED CAUSE, CLASS I: Chronic | ICD-10-CM

## 2025-09-05 DIAGNOSIS — E66.9 OBESITY, UNSPECIFIED: ICD-10-CM

## 2025-09-05 PROCEDURE — G2211 COMPLEX E/M VISIT ADD ON: CPT | Mod: NC,95

## 2025-09-05 PROCEDURE — 99213 OFFICE O/P EST LOW 20 MIN: CPT | Mod: 95

## 2025-09-17 DIAGNOSIS — E66.9 OBESITY, UNSPECIFIED: ICD-10-CM

## 2025-09-17 DIAGNOSIS — E78.5 HYPERLIPIDEMIA, UNSPECIFIED: ICD-10-CM
